# Patient Record
Sex: FEMALE | Race: BLACK OR AFRICAN AMERICAN | NOT HISPANIC OR LATINO | Employment: FULL TIME | URBAN - METROPOLITAN AREA
[De-identification: names, ages, dates, MRNs, and addresses within clinical notes are randomized per-mention and may not be internally consistent; named-entity substitution may affect disease eponyms.]

---

## 2018-10-13 ENCOUNTER — HOSPITAL ENCOUNTER (EMERGENCY)
Facility: HOSPITAL | Age: 38
Discharge: HOME | End: 2018-10-13
Attending: EMERGENCY MEDICINE | Admitting: EMERGENCY MEDICINE
Payer: COMMERCIAL

## 2018-10-13 VITALS
OXYGEN SATURATION: 98 % | HEART RATE: 75 BPM | TEMPERATURE: 96.7 F | RESPIRATION RATE: 16 BRPM | DIASTOLIC BLOOD PRESSURE: 71 MMHG | SYSTOLIC BLOOD PRESSURE: 109 MMHG | WEIGHT: 172.84 LBS

## 2018-10-13 DIAGNOSIS — M79.10 GENERALIZED MUSCLE ACHE: ICD-10-CM

## 2018-10-13 DIAGNOSIS — H92.01 RIGHT EAR PAIN: Primary | ICD-10-CM

## 2018-10-13 DIAGNOSIS — M54.9 BACK PAIN: ICD-10-CM

## 2018-10-13 PROCEDURE — 99283 EMERGENCY DEPT VISIT LOW MDM: CPT

## 2018-10-13 RX ORDER — LORATADINE 10 MG/1
10 TABLET ORAL DAILY
Qty: 20 TABLET | Refills: 0 | Status: SHIPPED | OUTPATIENT
Start: 2018-10-13 | End: 2019-03-28

## 2018-10-13 RX ORDER — BACLOFEN 10 MG/1
10 TABLET ORAL 3 TIMES DAILY
Qty: 15 TABLET | Refills: 0 | Status: SHIPPED | OUTPATIENT
Start: 2018-10-13 | End: 2019-03-28

## 2018-10-13 NOTE — DISCHARGE INSTRUCTIONS
Acute Low Back Pain   WHAT YOU NEED TO KNOW:   Acute low back pain is sudden discomfort in your lower back area that lasts for up to 6 weeks  The discomfort makes it difficult to tolerate activity  DISCHARGE INSTRUCTIONS:   Return to the emergency department if:   · You have severe pain  · You have sudden stiffness and heaviness on both buttocks down to both legs  · You have numbness or weakness in one leg, or pain in both legs  · You have numbness in your genital area or across your lower back  · You cannot control your urine or bowel movements  Contact your healthcare provider if:   · You have a fever  · You have pain at night or when you rest     · Your pain does not get better with treatment  · You have pain that worsens when you cough or sneeze  · You suddenly feel something pop or snap in your back  · You have questions or concerns about your condition or care  Medicines: The following medicines may be ordered by your healthcare provider:  · Acetaminophen  decreases pain  It is available without a doctor's order  Ask how much to take and how often to take it  Follow directions  Acetaminophen can cause liver damage if not taken correctly  · NSAIDs  help decrease swelling and pain  This medicine is available with or without a doctor's order  NSAIDs can cause stomach bleeding or kidney problems in certain people  If you take blood thinner medicine, always ask your healthcare provider if NSAIDs are safe for you  Always read the medicine label and follow directions  · Prescription pain medicine  may be given  Ask your healthcare provider how to take this medicine safely  · Muscle relaxers  decrease pain by relaxing the muscles in your lower spine  · Take your medicine as directed  Contact your healthcare provider if you think your medicine is not helping or if you have side effects  Tell him of her if you are allergic to any medicine   Keep a list of the medicines, vitamins, and herbs you take  Include the amounts, and when and why you take them  Bring the list or the pill bottles to follow-up visits  Carry your medicine list with you in case of an emergency  Self-care:   · Stay active  as much as you can without causing more pain  Bed rest could make your back pain worse  Start with some light exercises such as walking  Avoid heavy lifting until your pain is gone  Ask for more information about the activities or exercises that are right for you  · Ice  helps decrease swelling, pain, and muscle spams  Put crushed ice in a plastic bag  Cover it with a towel  Place it on your lower back for 20 to 30 minutes every 2 hours  Do this for about 2 to 3 days after your pain starts, or as directed  · Heat  helps decrease pain and muscle spasms  Start to use heat after treatment with ice has stopped  Use a small towel dampened with warm water or a heating pad, or sit in a warm bath  Apply heat on the area for 20 to 30 minutes every 2 hours for as many days as directed  Alternate heat and ice  Prevent acute low back pain:   · Use proper body mechanics  ¨ Bend at the hips and knees when you  objects  Do not bend from the waist  Use your leg muscles as you lift the load  Do not use your back  Keep the object close to your chest as you lift it  Try not to twist or lift anything above your waist     ¨ Change your position often when you stand for long periods of time  Rest one foot on a small box or footrest, and then switch to the other foot often  ¨ Try not to sit for long periods of time  When you do, sit in a straight-backed chair with your feet flat on the floor  Never reach, pull, or push while you are sitting  · Do exercises that strengthen your back muscles  Warm up before you exercise  Ask your healthcare provider the best exercises for you  · Maintain a healthy weight  Ask your healthcare provider how much you should weigh   Ask him to help you create a weight loss plan if you are overweight  Follow up with your healthcare provider as directed:  Return for a follow-up visit if you still have pain after 1 to 3 weeks of treatment  You may need to visit an orthopedist if your back pain lasts more than 12 weeks  Write down your questions so you remember to ask them during your visits  © 2017 [employer]  Information is for End User's use only and may not be sold, redistributed or otherwise used for commercial purposes  All illustrations and images included in CareNotes® are the copyrighted property of [employer]  or [employer]  The above information is an  only  It is not intended as medical advice for individual conditions or treatments  Talk to your doctor, nurse or pharmacist before following any medical regimen to see if it is safe and effective for you

## 2018-10-13 NOTE — ED PROVIDER NOTES
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)        Past Surgical History:   Procedure Laterality Date    TUBAL LIGATION         History reviewed  No pertinent family history  I have reviewed and agree with the history as documented  Social History   Substance Use Topics    Smoking status: Current Every Day Smoker     Packs/day: 0 25     Types: Cigarettes    Smokeless tobacco: Not on file    Alcohol use No        Review of Systems   All other systems reviewed and are negative  Physical Exam  Physical Exam   Constitutional: She is oriented to person, place, and time  She appears well-developed and well-nourished  No distress  HENT:   Head: Normocephalic and atraumatic  Right Ear: External ear normal    Left Ear: External ear normal    Ears:    TM normal b/l   Eyes: Conjunctivae are normal    EOM grossly intact   Neck: Normal range of motion  Neck supple  No JVD present  Cardiovascular: Normal rate  Pulmonary/Chest: Effort normal    Abdominal: Soft  Musculoskeletal:        Arms:  FROM back and LE b/l, steady gait, cap refill brisk, strength and sensation grossly intact throughout   Neurological: She is alert and oriented to person, place, and time  Skin: Skin is warm and dry  Capillary refill takes less than 2 seconds  Psychiatric: She has a normal mood and affect  Her behavior is normal    Nursing note and vitals reviewed  Vital Signs  ED Triage Vitals [10/13/18 1144]   Temperature Pulse Respirations Blood Pressure SpO2   (!) 96 7 °F (35 9 °C) 75 16 109/71 98 %      Temp Source Heart Rate Source Patient Position - Orthostatic VS BP Location FiO2 (%)   Tympanic Monitor Sitting Left arm --      Pain Score       --           Vitals:    10/13/18 1144   BP: 109/71   Pulse: 75   Patient Position - Orthostatic VS: Sitting       Visual Acuity      ED Medications  Medications - No data to display    Diagnostic Studies  Results Reviewed     None                 No orders to display              Procedures  Procedures       Phone Contacts  ED Phone Contact    ED Course                               MDM  Number of Diagnoses or Management Options  Back pain:   Generalized muscle ache:   Right ear pain:   Diagnosis management comments: 68-year-old female presenting with right ear pain and right lower back pain, physical exam right ear is benign no signs infection, will prescribe her claritin for home to use for congestion, patient also complaining of right lower back pain and she has a strenuous job and is constantly bending over and lifting heavy, will prescribe baclofen for home, patient also requesting work note, instructed to follow up with PCP as needed outpatient    strict return to ED precautions discussed  Pt verbalizes understanding and agrees with plan  Pt is stable for discharge    Portions of the record may have been created with voice recognition software   Occasional wrong word or "sound a like" substitutions may have occurred due to the inherent limitations of voice recognition software  Read the chart carefully and recognize, using context, where substitutions have occurred  CritCare Time    Disposition  Final diagnoses:   Right ear pain   Back pain   Generalized muscle ache     Time reflects when diagnosis was documented in both MDM as applicable and the Disposition within this note     Time User Action Codes Description Comment    10/13/2018 12:07 PM Xochitl Rasp Add [H92 01] Right ear pain     10/13/2018 12:07 PM Xochitl Rasp Add [M54 9] Back pain     10/13/2018 12:07 PM Xochitl Rasp Add [M79 10] Generalized muscle ache       ED Disposition     ED Disposition Condition Comment    Discharge  Radha Said discharge to home/self care  Condition at discharge: Good        Follow-up Information     Follow up With Specialties Details Why [address] [facility] Schedule an appointment as soon as possible for a visit As needed [address] [address]            Discharge Medication List as of 10/13/2018 12:08 PM      START taking these medications    Details   baclofen 10 mg tablet Take 1 tablet (10 mg total) by mouth 3 (three) times a day, Starting Sat 10/13/2018, Print      loratadine (CLARITIN) 10 mg tablet Take 1 tablet (10 mg total) by mouth daily, Starting Sat 10/13/2018, Print           No discharge procedures on file      ED Provider  Electronically Signed by           Floyd Strickland PA-C  10/13/18 5394

## 2018-11-08 ENCOUNTER — HOSPITAL ENCOUNTER (EMERGENCY)
Facility: HOSPITAL | Age: 38
Discharge: HOME | End: 2018-11-08
Attending: EMERGENCY MEDICINE
Payer: COMMERCIAL

## 2018-11-08 VITALS
DIASTOLIC BLOOD PRESSURE: 71 MMHG | HEART RATE: 61 BPM | SYSTOLIC BLOOD PRESSURE: 125 MMHG | OXYGEN SATURATION: 100 % | RESPIRATION RATE: 13 BRPM

## 2018-11-08 DIAGNOSIS — R11.2 NON-INTRACTABLE VOMITING WITH NAUSEA, UNSPECIFIED VOMITING TYPE: Primary | ICD-10-CM

## 2018-11-08 LAB
BACTERIA UR QL AUTO: ABNORMAL /HPF
BILIRUB UR QL STRIP: NEGATIVE
CLARITY UR: CLEAR
COLOR UR: ABNORMAL
EXT PREG TEST URINE: NORMAL
GLUCOSE UR STRIP-MCNC: NEGATIVE MG/DL
HGB UR QL STRIP.AUTO: 50
KETONES UR STRIP-MCNC: NEGATIVE MG/DL
LEUKOCYTE ESTERASE UR QL STRIP: 100
MUCOUS THREADS UR QL AUTO: ABNORMAL
NITRITE UR QL STRIP: NEGATIVE
NON-SQ EPI CELLS URNS QL MICRO: ABNORMAL /HPF
PH UR STRIP.AUTO: 6 [PH] (ref 4.5–8)
PROT UR STRIP-MCNC: ABNORMAL MG/DL
RBC #/AREA URNS AUTO: ABNORMAL /HPF
SP GR UR STRIP.AUTO: 1.02 (ref 1–1.04)
UROBILINOGEN UA: 4 MG/DL
WBC #/AREA URNS AUTO: ABNORMAL /HPF

## 2018-11-08 PROCEDURE — 81025 URINE PREGNANCY TEST: CPT | Performed by: NURSE PRACTITIONER

## 2018-11-08 PROCEDURE — 81001 URINALYSIS AUTO W/SCOPE: CPT | Performed by: NURSE PRACTITIONER

## 2018-11-08 PROCEDURE — 99283 EMERGENCY DEPT VISIT LOW MDM: CPT

## 2018-11-08 PROCEDURE — 81003 URINALYSIS AUTO W/O SCOPE: CPT | Performed by: NURSE PRACTITIONER

## 2018-11-08 RX ORDER — ONDANSETRON 4 MG/1
4 TABLET, ORALLY DISINTEGRATING ORAL EVERY 6 HOURS PRN
Qty: 12 TABLET | Refills: 0 | Status: SHIPPED | OUTPATIENT
Start: 2018-11-08 | End: 2019-03-06 | Stop reason: ALTCHOICE

## 2018-11-08 RX ORDER — CIPROFLOXACIN HYDROCHLORIDE 3.5 MG/ML
1 SOLUTION/ DROPS TOPICAL
Qty: 5 ML | Refills: 0 | Status: SHIPPED | OUTPATIENT
Start: 2018-11-08 | End: 2018-11-08

## 2018-11-08 RX ORDER — ONDANSETRON 4 MG/1
4 TABLET, ORALLY DISINTEGRATING ORAL ONCE
Status: COMPLETED | OUTPATIENT
Start: 2018-11-08 | End: 2018-11-08

## 2018-11-08 RX ADMIN — ONDANSETRON 4 MG: 4 TABLET, ORALLY DISINTEGRATING ORAL at 08:03

## 2018-11-08 NOTE — ED NOTES
Tolerated PO intake well  No vomiting noted/reported    Denies nausea at present     Jaswinder Daniel, RN  11/08/18 8453

## 2018-11-08 NOTE — ED NOTES
Pt given PO challenge of ginger ale    Will re-assess pt for tolerance     Dipti Hernández RN  11/08/18 0581

## 2018-11-08 NOTE — ED PROVIDER NOTES
History  Chief Complaint   Patient presents with    Vomiting     I have nausea all day yesterday  Still have it, but I vomited twice today  I have no diarrhea  Patient is a 27-year-old female presenting to the emergency department with constant nausea since yesterday with 2 episodes of vomiting today  Patient reports she woke yesterday with this nausea sensation, tried drinking ginger ale yesterday, with no relief  Today, while getting ready for work vomited twice  She denies any abdominal pain, only nausea sensation  Reports no diarrhea or constipation  Denies any fevers or chills  She reports no vaginal/pelvic pain or discomfort  No abnormal discharge  Reports she is finishing her menstrual period currently  Otherwise reports no other symptoms  Denies any ill contacts, with the exception of her children who she reports have cold-like symptoms  Patient otherwise reports no other concerns  Prior to Admission Medications   Prescriptions Last Dose Informant Patient Reported? Taking?   baclofen 10 mg tablet   No No   Sig: Take 1 tablet (10 mg total) by mouth 3 (three) times a day   loratadine (CLARITIN) 10 mg tablet   No No   Sig: Take 1 tablet (10 mg total) by mouth daily      Facility-Administered Medications: None       Past Medical History:   Diagnosis Date    Seizures ([facility] [address] )        Past Surgical History:   Procedure Laterality Date    TUBAL LIGATION         History reviewed  No pertinent family history  I have reviewed and agree with the history as documented  Social History   Substance Use Topics    Smoking status: Current Every Day Smoker     Packs/day: 0 25     Types: Cigarettes    Smokeless tobacco: Never Used    Alcohol use No        Review of Systems   Constitutional: Negative for chills, fatigue and fever  HENT: Negative  Respiratory: Negative for shortness of breath  Cardiovascular: Negative for chest pain  Gastrointestinal: Positive for nausea and vomiting  Negative for abdominal pain, constipation and diarrhea  Endocrine: Negative  Genitourinary: Negative for difficulty urinating, dysuria, enuresis, flank pain, frequency, menstrual problem, urgency, vaginal discharge and vaginal pain  Musculoskeletal: Negative for arthralgias, joint swelling, neck pain and neck stiffness  Skin: Negative for rash and wound  Allergic/Immunologic: Negative for immunocompromised state  Neurological: Negative for dizziness, light-headedness and headaches  Hematological: Negative for adenopathy  Physical Exam  Physical Exam   Constitutional: She is oriented to person, place, and time  She appears well-developed and well-nourished  No distress  HENT:   Right Ear: External ear normal    Left Ear: External ear normal    Mouth/Throat: Oropharynx is clear and moist    Eyes: Conjunctivae are normal    Neck: Normal range of motion  Neck supple  Cardiovascular: Normal rate and regular rhythm  Pulmonary/Chest: Effort normal and breath sounds normal  No respiratory distress  Abdominal: Soft  She exhibits no distension  There is no tenderness  There is no rebound and no guarding  Patient denies any abdominal tenderness, but reports worsening of nausea during palpation  Lymphadenopathy:     She has no cervical adenopathy  Neurological: She is alert and oriented to person, place, and time  Skin: Skin is warm and dry  No rash noted  Psychiatric: She has a normal mood and affect  Nursing note and vitals reviewed        Vital Signs  ED Triage Vitals [11/08/18 0808]   Temp Pulse Respirations Blood Pressure SpO2   -- 61 13 125/71 100 %      Temp src Heart Rate Source Patient Position - Orthostatic VS BP Location FiO2 (%)   -- Monitor Sitting Left arm --      Pain Score       --           Vitals:    11/08/18 0808   BP: 125/71   Pulse: 61   Patient Position - Orthostatic VS: Sitting       Visual Acuity      ED Medications  Medications   ondansetron (ZOFRAN-ODT) dispersible tablet 4 mg (4 mg Oral Given 11/8/18 0803)       Diagnostic Studies  Results Reviewed     Procedure Component Value Units Date/Time    Urine Microscopic [[id number]]  (Abnormal) Collected:  11/08/18 0809    Lab Status:  Final result Specimen:  Urine from Urine, Clean Catch Updated:  11/08/18 0831     RBC, UA 1-2 (A) /hpf      WBC, UA 2-4 (A) /hpf      Epithelial Cells Moderate (A) /hpf      Bacteria, UA None Seen /hpf      MUCUS THREADS Occasional (A)    UA w Reflex to Microscopic w Reflex to Culture [[id number]]  (Abnormal) Collected:  11/08/18 0809    Lab Status:  Final result Specimen:  Urine from Urine, Clean Catch Updated:  11/08/18 0828     Color, UA Isabel (A)     Clarity, UA Clear     Specific [place], UA 1 020     pH, UA 6 0     Leukocytes,  0 (A)     Nitrite, UA Negative     Protein, UA 15 (Trace) (A) mg/dl      Glucose, UA Negative mg/dl      Ketones, UA Negative mg/dl      Bilirubin, UA Negative     Blood, UA 50 0 (A)     UROBILINOGEN UA 4 0 (A) mg/dL     POCT pregnancy, urine [[id number]]  (Normal) Resulted:  11/08/18 0810    Lab Status:  Final result Updated:  11/08/18 0811     EXT PREG TEST UR (Ref: Negative) pregnancy result is negative                 No orders to display              Procedures  Procedures       Phone Contacts  ED Phone Contact    ED Course  ED Course as of Nov 08 0849   u Nov 08, 2018   0360 Pregnancy negative  UA shows no evidence of UTI  Patient reports nausea as moderately improved after receiving Zofran  Will do PO challenge  MDM  Number of Diagnoses or Management Options  Non-intractable vomiting with nausea, unspecified vomiting type: new and requires workup  Diagnosis management comments: Pregnancy negative  No UTI as seen on UA  Patient with resolution of nausea after receiving Zofran orally  Tolerating oral fluids well  No episodes of vomiting while in the emergency department    Patient instructed to contact info blink to establish care with a primary provider for follow-up  Advised to return to the ED with any worsening symptoms or emergent concerns  Amount and/or Complexity of Data Reviewed  Clinical lab tests: ordered and reviewed    Patient Progress  Patient progress: stable    CritCare Time    Disposition  Final diagnoses:   Non-intractable vomiting with nausea, unspecified vomiting type     Time reflects when diagnosis was documented in both MDM as applicable and the Disposition within this note     Time User Action Codes Description Comment    11/8/2018  8:15 AM Gagandeep Houston Add [H10 9] Conjunctivitis of left eye, unspecified conjunctivitis type     11/8/2018  8:30 AM Gagandeep Houston Remove [H10 9] Conjunctivitis of left eye, unspecified conjunctivitis type     11/8/2018  8:46 AM Gagandeep Houston Add [R11 2] Non-intractable vomiting with nausea, unspecified vomiting type       ED Disposition     ED Disposition Condition Comment    Discharge  Laurey Gowers discharge to home/self care  Condition at discharge: Stable        Follow-up Information     Follow up With Specialties Details Why Nicol Mendoza  Call to find a primary care provider for routine and follow up care  [facility] [place] Emergency Department Emergency Medicine  As needed, If symptoms worsen [address] [facility] [address] [phone]          Patient's Medications   Discharge Prescriptions    ONDANSETRON (ZOFRAN-ODT) 4 MG DISINTEGRATING TABLET    Take 1 tablet (4 mg total) by mouth every 6 (six) hours as needed for nausea or vomiting for up to 3 days       Start Date: 11/8/2018 End Date: 11/11/2018       Order Dose: 4 mg       Quantity: 12 tablet    Refills: 0     No discharge procedures on file      ED Provider  Electronically Signed by           KIM Phillips  11/08/18 Bryan Gaston  11/08/18 1861

## 2018-11-08 NOTE — DISCHARGE INSTRUCTIONS
Acute Nausea and Vomiting   WHAT YOU NEED TO KNOW:   Acute nausea and vomiting start suddenly, worsen quickly, and last a short time  DISCHARGE INSTRUCTIONS:   Return to the emergency department if:   · You see blood in your vomit or your bowel movements  · You have sudden, severe pain in your chest and upper abdomen after hard vomiting or retching  · You have swelling in your neck and chest      · You are dizzy, cold, and thirsty and your eyes and mouth are dry  · You are urinating very little or not at all  · You have muscle weakness, leg cramps, and trouble breathing  · Your heart is beating much faster than normal      · You continue to vomit for more than 48 hours  Contact your healthcare provider if:   · You have frequent dry heaves (vomiting but nothing comes out)  · Your nausea and vomiting does not get better or go away after you use medicine  · You have questions or concerns about your condition or treatment  Medicines: You may need any of the following:  · Medicines  may be given to calm your stomach and stop your vomiting  You may also need medicines to help you feel more relaxed or to stop nausea and vomiting caused by motion sickness  · Gastrointestinal stimulants  are used to help empty your stomach and bowels  This may help decrease nausea and vomiting  · Take your medicine as directed  Contact your healthcare provider if you think your medicine is not helping or if you have side effects  Tell him or her if you are allergic to any medicine  Keep a list of the medicines, vitamins, and herbs you take  Include the amounts, and when and why you take them  Bring the list or the pill bottles to follow-up visits  Carry your medicine list with you in case of an emergency  Prevent or manage acute nausea and vomiting:   · Do not drink alcohol  Alcohol may upset or irritate your stomach  Too much alcohol can also cause acute nausea and vomiting  · Control stress    Headaches due to stress may cause nausea and vomiting  Find ways to relax and manage your stress  Get more rest and sleep  · Drink more liquids as directed  Vomiting can lead to dehydration  It is important to drink more liquids to help replace lost body fluids  Ask your healthcare provider how much liquid to drink each day and which liquids are best for you  Your provider may recommend that you drink an oral rehydration solution (ORS)  ORS contains water, salts, and sugar that are needed to replace the lost body fluids  Ask what kind of ORS to use, how much to drink, and where to get it  · Eat smaller meals, more often  Eat small amounts of food every 2 to 3 hours, even if you are not hungry  Food in your stomach may decrease your nausea  · Talk to your healthcare provider before you take over-the-counter (OTC) medicines  These medicines can cause serious problems if you use certain other medicines, or you have a medical condition  You may have problems if you use too much or use them for longer than the label says  Follow directions on the label carefully  Follow up with your healthcare provider as directed:  Write down your questions so you remember to ask them during your follow-up visits  © 2017 [employer] [employer] Information is for End User's use only and may not be sold, redistributed or otherwise used for commercial purposes  All illustrations and images included in CareNotes® are the copyrighted property of [employer]  or [employer]  The above information is an  only  It is not intended as medical advice for individual conditions or treatments  Talk to your doctor, nurse or pharmacist before following any medical regimen to see if it is safe and effective for you

## 2019-01-18 ENCOUNTER — HOSPITAL ENCOUNTER (EMERGENCY)
Facility: HOSPITAL | Age: 39
Discharge: LEFT BEFORE DISCHARGE | End: 2019-01-18
Attending: EMERGENCY MEDICINE
Payer: COMMERCIAL

## 2019-01-18 VITALS
OXYGEN SATURATION: 98 % | SYSTOLIC BLOOD PRESSURE: 112 MMHG | TEMPERATURE: 93.5 F | RESPIRATION RATE: 16 BRPM | WEIGHT: 174.82 LBS | DIASTOLIC BLOOD PRESSURE: 76 MMHG | HEART RATE: 65 BPM

## 2019-01-18 DIAGNOSIS — N94.6 DYSMENORRHEA: Primary | ICD-10-CM

## 2019-01-18 LAB
BACTERIA UR QL AUTO: ABNORMAL /HPF
BILIRUB UR QL STRIP: NEGATIVE
CLARITY UR: ABNORMAL
COLOR UR: YELLOW
EXT PREG TEST URINE: NEGATIVE
GLUCOSE UR STRIP-MCNC: NEGATIVE MG/DL
HGB UR QL STRIP.AUTO: 250
KETONES UR STRIP-MCNC: NEGATIVE MG/DL
LEUKOCYTE ESTERASE UR QL STRIP: 25
MUCOUS THREADS UR QL AUTO: ABNORMAL
NITRITE UR QL STRIP: NEGATIVE
NON-SQ EPI CELLS URNS QL MICRO: ABNORMAL /HPF
PH UR STRIP.AUTO: 6 [PH] (ref 4.5–8)
PROT UR STRIP-MCNC: NEGATIVE MG/DL
RBC #/AREA URNS AUTO: ABNORMAL /HPF
SP GR UR STRIP.AUTO: 1.01 (ref 1–1.04)
UROBILINOGEN UA: NEGATIVE MG/DL
WBC #/AREA URNS AUTO: ABNORMAL /HPF

## 2019-01-18 PROCEDURE — 81025 URINE PREGNANCY TEST: CPT | Performed by: EMERGENCY MEDICINE

## 2019-01-18 PROCEDURE — 99284 EMERGENCY DEPT VISIT MOD MDM: CPT

## 2019-01-18 PROCEDURE — 81001 URINALYSIS AUTO W/SCOPE: CPT | Performed by: EMERGENCY MEDICINE

## 2019-01-18 RX ORDER — IBUPROFEN 600 MG/1
600 TABLET ORAL ONCE
Status: COMPLETED | OUTPATIENT
Start: 2019-01-18 | End: 2019-01-18

## 2019-01-18 RX ADMIN — IBUPROFEN 600 MG: 600 TABLET ORAL at 11:26

## 2019-01-18 NOTE — ED PROVIDER NOTES
History  Chief Complaint   Patient presents with    Dysmenorrhea     "I have had my menstrual two times this month and I am having a lot of pain"   c/o pelvic cramping   patient took no medication for pain and did not contact GYN due to "I don't have one and I need one"     This is a 75-year-old female presents emergency department with early menstrual cycle  Patient states that she or her last menses began at the beginning of January  It was a normal period  Began with light bleeding today  Positive cramping  No fevers or chills  Patient has her tubes tied  Pain is moderate severity  She did not try any over-the-counter medications for pain  Patient does not have an OBGYN  No radiation of symptoms  No aggravating or alleviating factors  No other associated symptoms  Differential diagnosis includes dysmenorrhea, pregnancy, UTI  Prior to Admission Medications   Prescriptions Last Dose Informant Patient Reported? Taking?   baclofen 10 mg tablet Not Taking at Unknown time  No No   Sig: Take 1 tablet (10 mg total) by mouth 3 (three) times a day   Patient not taking: Reported on 1/18/2019    loratadine (CLARITIN) 10 mg tablet Not Taking at Unknown time  No No   Sig: Take 1 tablet (10 mg total) by mouth daily   Patient not taking: Reported on 1/18/2019    ondansetron (ZOFRAN-ODT) 4 mg disintegrating tablet   No No   Sig: Take 1 tablet (4 mg total) by mouth every 6 (six) hours as needed for nausea or vomiting for up to 3 days      Facility-Administered Medications: None       Past Medical History:   Diagnosis Date    Seizures ([address] )        Past Surgical History:   Procedure Laterality Date    TUBAL LIGATION         History reviewed  No pertinent family history  I have reviewed and agree with the history as documented      Social History   Substance Use Topics    Smoking status: Current Every Day Smoker     Packs/day: 0 00    Smokeless tobacco: Never Used    Alcohol use No        Review of Systems   Constitutional: Negative for activity change, appetite change and fever  HENT: Negative for congestion, ear pain, rhinorrhea and sore throat  Eyes: Negative for pain and redness  Respiratory: Negative for cough and shortness of breath  Cardiovascular: Negative for chest pain and palpitations  Gastrointestinal: Positive for abdominal pain  Negative for diarrhea, nausea and vomiting  Endocrine: Negative for polyuria  Genitourinary: Positive for menstrual problem, pelvic pain and vaginal bleeding  Negative for decreased urine volume, difficulty urinating, dysuria, frequency, urgency, vaginal discharge and vaginal pain  Musculoskeletal: Negative for arthralgias and myalgias  Skin: Negative for color change and rash  Allergic/Immunologic: Negative for immunocompromised state  Neurological: Negative for dizziness, syncope and light-headedness  Hematological: Does not bruise/bleed easily  Psychiatric/Behavioral: Negative for confusion  Physical Exam  Physical Exam   Constitutional: She is oriented to person, place, and time  She appears well-developed and well-nourished  HENT:   Head: Normocephalic and atraumatic  Nose: Nose normal    Eyes: Conjunctivae are normal  No scleral icterus  Neck: Normal range of motion  Neck supple  Cardiovascular: Normal rate, regular rhythm, normal heart sounds and intact distal pulses  Pulmonary/Chest: Effort normal and breath sounds normal  No stridor  No respiratory distress  She has no wheezes  Abdominal: Soft  She exhibits no distension  There is tenderness (Mild suprapubic tenderness  No rebound or guarding  )  There is no rebound and no guarding  Musculoskeletal: She exhibits no edema or deformity  Neurological: She is alert and oriented to person, place, and time  Skin: Skin is warm and dry  No rash noted  Psychiatric: She has a normal mood and affect  Thought content normal    Nursing note and vitals reviewed        Vital Signs  ED Triage Vitals [01/18/19 1106]   Temperature Pulse Respirations Blood Pressure SpO2   (!) 93 5 °F (34 2 °C) 65 16 112/76 98 %      Temp Source Heart Rate Source Patient Position - Orthostatic VS BP Location FiO2 (%)   Tympanic Monitor Sitting Left arm --      Pain Score       --           Vitals:    01/18/19 1106   BP: 112/76   Pulse: 65   Patient Position - Orthostatic VS: Sitting       Visual Acuity      ED Medications  Medications   ibuprofen (MOTRIN) tablet 600 mg (600 mg Oral Given 1/18/19 1126)       Diagnostic Studies  Results Reviewed     Procedure Component Value Units Date/Time    Urine Microscopic [[id number]]  (Abnormal) Collected:  01/18/19 1126    Lab Status:  Final result Specimen:  Urine from Urine, Clean Catch Updated:  01/18/19 1144     RBC, UA 30-50 (A) /hpf      WBC, UA 4-10 (A) /hpf      Epithelial Cells Moderate (A) /hpf      Bacteria, UA Occasional /hpf      MUCUS THREADS Occasional (A)    UA w Reflex to Microscopic [[id number]]  (Abnormal) Collected:  01/18/19 1126    Lab Status:  Final result Specimen:  Urine from Urine, Clean Catch Updated:  01/18/19 1134     Color, UA Yellow     Clarity, UA Slightly Cloudy (A)     Specific Gravity, UA 1 015     pH, UA 6 0     Leukocytes, UA 25 0 (A)     Nitrite, UA Negative     Protein, UA Negative mg/dl      Glucose, UA Negative mg/dl      Ketones, UA Negative mg/dl      Bilirubin, UA Negative     Blood,  0 (A)     UROBILINOGEN UA Negative mg/dL     POCT pregnancy, urine [[id number]]  (Normal) Resulted:  01/18/19 1126    Lab Status:  Final result Updated:  01/18/19 1126     EXT PREG TEST UR (Ref: Negative) negative                 No orders to display              Procedures  Procedures       Phone Contacts  ED Phone Contact    ED Course  ED Course as of Jan 18 2314 Fri Jan 18, 2019   1149 Patient left AMA after speaking with nurse  Did not want to wait any longer                                   [facility]  CritCare Time    Disposition  Final diagnoses:   Dysmenorrhea     Time reflects when diagnosis was documented in both MDM as applicable and the Disposition within this note     Time User Action Codes Description Comment    1/18/2019 11:08 PM Darell Mcbride Add [N94 6] Dysmenorrhea       ED Disposition     ED Disposition Condition Comment    AMA  Date: 1/18/2019  Patient: Jose Angel Epps  Admitted: 1/18/2019 11:04 AM  Attending Provider: Michael Morse MD    Jose Angel Epps or her authorized caregiver has made the decision for the patient to leave the emergency department against the  advice of Dr Herber Charles  She or her authorized caregiver has been informed and understands the inherent risks, including death  She or her authorized caregiver has decided to accept the responsibility for this decision  Jose Angel Epps and all necessar y parties have been advised that she may return for further evaluation or treatment  Her condition at time of discharge was stable  Jose Angel Epps had current vital signs as follows:  /76 (BP Location: Left arm)   Pulse 65   Temp (!) 93 5 ° F (34 2 °C) (Tympanic)   Resp 16   Wt 79 3 kg (174 lb 13 2 oz)         Follow-up Information    None         Discharge Medication List as of 1/18/2019 11:55 AM      CONTINUE these medications which have NOT CHANGED    Details   baclofen 10 mg tablet Take 1 tablet (10 mg total) by mouth 3 (three) times a day, Starting Sat 10/13/2018, Print      loratadine (CLARITIN) 10 mg tablet Take 1 tablet (10 mg total) by mouth daily, Starting Sat 10/13/2018, Print      ondansetron (ZOFRAN-ODT) 4 mg disintegrating tablet Take 1 tablet (4 mg total) by mouth every 6 (six) hours as needed for nausea or vomiting for up to 3 days, Starting Thu 11/8/2018, Until Sun 11/11/2018, Print           No discharge procedures on file      ED Provider  Electronically Signed by           Michael Morse MD  01/18/19 9159

## 2019-03-06 ENCOUNTER — HOSPITAL ENCOUNTER (EMERGENCY)
Facility: HOSPITAL | Age: 39
Discharge: HOME | End: 2019-03-06
Attending: EMERGENCY MEDICINE
Payer: COMMERCIAL

## 2019-03-06 ENCOUNTER — APPOINTMENT (EMERGENCY)
Dept: RADIOLOGY | Facility: HOSPITAL | Age: 39
End: 2019-03-06
Payer: COMMERCIAL

## 2019-03-06 VITALS
WEIGHT: 175.71 LBS | RESPIRATION RATE: 18 BRPM | OXYGEN SATURATION: 100 % | SYSTOLIC BLOOD PRESSURE: 116 MMHG | TEMPERATURE: 97.1 F | HEART RATE: 69 BPM | DIASTOLIC BLOOD PRESSURE: 81 MMHG

## 2019-03-06 DIAGNOSIS — J18.9 PNEUMONIA: Primary | ICD-10-CM

## 2019-03-06 LAB
FLUAV + FLUBV RNA ISLT NAA+PROBE: NOT DETECTED
FLUAV + FLUBV RNA ISLT NAA+PROBE: NOT DETECTED

## 2019-03-06 PROCEDURE — 71046 X-RAY EXAM CHEST 2 VIEWS: CPT

## 2019-03-06 PROCEDURE — 87502 INFLUENZA DNA AMP PROBE: CPT | Performed by: PHYSICIAN ASSISTANT

## 2019-03-06 PROCEDURE — 99283 EMERGENCY DEPT VISIT LOW MDM: CPT

## 2019-03-06 RX ORDER — BENZONATATE 100 MG/1
100 CAPSULE ORAL EVERY 8 HOURS
Qty: 21 CAPSULE | Refills: 0 | Status: SHIPPED | OUTPATIENT
Start: 2019-03-06 | End: 2019-03-28

## 2019-03-06 RX ORDER — GUAIFENESIN 100 MG/5ML
200 SYRUP ORAL 3 TIMES DAILY PRN
Qty: 120 ML | Refills: 0 | Status: SHIPPED | OUTPATIENT
Start: 2019-03-06 | End: 2019-03-16

## 2019-03-06 RX ORDER — AZITHROMYCIN 250 MG/1
TABLET, FILM COATED ORAL
Qty: 6 TABLET | Refills: 0 | Status: SHIPPED | OUTPATIENT
Start: 2019-03-06 | End: 2019-03-10

## 2019-03-06 NOTE — ED PROVIDER NOTES
History  Chief Complaint   Patient presents with    Cough     cough, body aches, chest pain with coughing, sore throat, congestion and headache for 2 days  24-year-old female no significant past medical history presenting for evaluation cough  Patient states that starting yesterday she has had generalized body aches along with productive cough  She reports fevers T-max of 101° had been relieved with ibuprofen at home  She states that every time she coughs she feels pressure in the sinuses  She states she has had sore throat and ear pain as well  No abdominal pain chest pain shortness of breath nausea vomiting or diarrhea  She reports she is concerned as it she was recently around someone diagnosed with pneumonia  Prior to Admission Medications   Prescriptions Last Dose Informant Patient Reported? Taking?   baclofen 10 mg tablet Not Taking at Unknown time  No No   Sig: Take 1 tablet (10 mg total) by mouth 3 (three) times a day   Patient not taking: Reported on 1/18/2019    loratadine (CLARITIN) 10 mg tablet Not Taking at Unknown time  No No   Sig: Take 1 tablet (10 mg total) by mouth daily   Patient not taking: Reported on 1/18/2019       Facility-Administered Medications: None       Past Medical History:   Diagnosis Date    Seizures ([facility] [address] )        Past Surgical History:   Procedure Laterality Date    TUBAL LIGATION         History reviewed  No pertinent family history  I have reviewed and agree with the history as documented  Social History     Tobacco Use    Smoking status: Current Every Day Smoker     Packs/day: 0 25    Smokeless tobacco: Never Used   Substance Use Topics    Alcohol use: No    Drug use: No        Review of Systems   All other systems reviewed and are negative  Physical Exam  Physical Exam   Constitutional: She is oriented to person, place, and time  She appears well-developed and well-nourished  No distress     Appears uncomfortable   HENT:   Head: Normocephalic and atraumatic  Right Ear: External ear normal    Left Ear: External ear normal    Mouth/Throat: No oropharyngeal exudate  Clear nasal discharge, enlarged tonsils but nonerythematous, no exudates visualized   Eyes: Conjunctivae are normal    EOM grossly intact   Neck: Normal range of motion  Neck supple  No JVD present  Cardiovascular: Normal rate  Pulmonary/Chest: Effort normal  No stridor  No respiratory distress  She has no wheezes  She has no rales  Abdominal: Soft  There is no tenderness  Musculoskeletal:   FROM, steady gait, cap refill brisk, strength and sensation grossly intact throughout   Lymphadenopathy:     She has no cervical adenopathy  Neurological: She is alert and oriented to person, place, and time  Skin: Skin is warm and dry  Capillary refill takes less than 2 seconds  Psychiatric: She has a normal mood and affect  Her behavior is normal    Nursing note and vitals reviewed        Vital Signs  ED Triage Vitals [03/06/19 0920]   Temperature Pulse Respirations Blood Pressure SpO2   (!) 97 4 °F (36 3 °C) 81 18 121/77 100 %      Temp Source Heart Rate Source Patient Position - Orthostatic VS BP Location FiO2 (%)   Tympanic Monitor Sitting Left arm --      Pain Score       --           Vitals:    03/06/19 0920   BP: 121/77   Pulse: 81   Patient Position - Orthostatic VS: Sitting       Visual Acuity      ED Medications  Medications - No data to display    Diagnostic Studies  Results Reviewed     Procedure Component Value Units Date/Time    Rapid Flu-Viral RNA amplification [facility] HEART ONLY) [[id number]]  (Normal) Collected:  03/06/19 0949    Lab Status:  Final result Specimen:  Nares from Nose Updated:  03/06/19 1017     INFLUENZA A AMPLIFIED RNA Not Detected     INFLUENZA B AMPLIFIED Not Detected                 XR chest 2 views   Final Result by Twan Hallman MD (03/06 1025)      Subtle relative increased parenchymal density over the right lower chest on the frontal view, suspicious for small patchy area of right lower chest pneumonia  The study was marked in [facility] for immediate notification  Workstation performed: [id number]                    Procedures  Procedures       Phone Contacts  ED Phone Contact    ED Course                               MDM  Number of Diagnoses or Management Options  Diagnosis management comments: 31-year-old female presenting for evaluation of cough, patient states she did come into contact with something test positive for pneumonia, chest x-ray today patient is found have small patchy infiltrate on the right lower lobe, will treat for pneumonia and sent home with antitussives, patient is afebrile and otherwise appears well, nontoxic appearing no acute distress no respiratory distress, follow up with PCP for further evaluation    All imaging discussed with patient, strict return to ED precautions discussed  Pt verbalizes understanding and agrees with plan  Pt is stable for discharge    Portions of the record may have been created with voice recognition software  Occasional wrong word or "sound a like" substitutions may have occurred due to the inherent limitations of voice recognition software  Read the chart carefully and recognize, using context, where substitutions have occurred  Disposition  Final diagnoses:   Pneumonia     Time reflects when diagnosis was documented in both MDM as applicable and the Disposition within this note     Time User Action Codes Description Comment    3/6/2019 10:37 AM Maggie Roberts Add [J18 9] Pneumonia       ED Disposition     ED Disposition Condition Date/Time Comment    Discharge Stable Wed Mar 6, 2019 10:37 AM Cy Hurtado discharge to home/self care              Follow-up Information     Follow up With Specialties Details Why Contact Info    Heather Forte MD Family Medicine Call  If symptoms worsen [phone] [facility] [address]  [address] [facility]  [address]  [facility] [address]             Patient's Medications Discharge Prescriptions    AZITHROMYCIN (ZITHROMAX) 250 MG TABLET    Take 2 tablets today then 1 tablet daily x 4 days       Start Date: 3/6/2019  End Date: 3/10/2019       Order Dose: --       Quantity: 6 tablet    Refills: 0    BENZONATATE (TESSALON PERLES) 100 MG CAPSULE    Take 1 capsule (100 mg total) by mouth every 8 (eight) hours       Start Date: 3/6/2019  End Date: --       Order Dose: 100 mg       Quantity: 21 capsule    Refills: 0    GUAIFENESIN (ROBITUSSIN) 100 MG/5 ML SYRUP    Take 10 mL (200 mg total) by mouth 3 (three) times a day as needed for cough for up to 10 days       Start Date: 3/6/2019  End Date: 3/16/2019       Order Dose: 200 mg       Quantity: 120 mL    Refills: 0     No discharge procedures on file      ED Provider  Electronically Signed by           Lang Live PA-C  03/06/19 1629

## 2019-03-28 ENCOUNTER — APPOINTMENT (EMERGENCY)
Dept: RADIOLOGY | Facility: HOSPITAL | Age: 39
End: 2019-03-28
Payer: COMMERCIAL

## 2019-03-28 ENCOUNTER — HOSPITAL ENCOUNTER (EMERGENCY)
Facility: HOSPITAL | Age: 39
Discharge: HOME | End: 2019-03-28
Attending: EMERGENCY MEDICINE
Payer: COMMERCIAL

## 2019-03-28 VITALS
TEMPERATURE: 98 F | RESPIRATION RATE: 16 BRPM | WEIGHT: 175 LBS | HEIGHT: 71 IN | SYSTOLIC BLOOD PRESSURE: 120 MMHG | HEART RATE: 76 BPM | BODY MASS INDEX: 24.5 KG/M2 | DIASTOLIC BLOOD PRESSURE: 69 MMHG | OXYGEN SATURATION: 97 %

## 2019-03-28 DIAGNOSIS — J02.9 PHARYNGITIS: Primary | ICD-10-CM

## 2019-03-28 DIAGNOSIS — R05.9 COUGH: ICD-10-CM

## 2019-03-28 LAB — S PYO AG THROAT QL: NEGATIVE

## 2019-03-28 PROCEDURE — 87430 STREP A AG IA: CPT | Performed by: PHYSICIAN ASSISTANT

## 2019-03-28 PROCEDURE — 71046 X-RAY EXAM CHEST 2 VIEWS: CPT

## 2019-03-28 PROCEDURE — 99283 EMERGENCY DEPT VISIT LOW MDM: CPT

## 2019-03-28 RX ORDER — ALBUTEROL SULFATE 90 UG/1
2 AEROSOL, METERED RESPIRATORY (INHALATION) EVERY 4 HOURS PRN
Qty: 1 INHALER | Refills: 0 | Status: SHIPPED | OUTPATIENT
Start: 2019-03-28 | End: 2022-02-08 | Stop reason: HOSPADM

## 2019-03-28 RX ORDER — GUAIFENESIN 100 MG/5ML
200 SYRUP ORAL 3 TIMES DAILY PRN
Qty: 120 ML | Refills: 0 | Status: SHIPPED | OUTPATIENT
Start: 2019-03-28 | End: 2019-04-07

## 2019-04-30 PROBLEM — Z72.0 TOBACCO USE: Status: ACTIVE | Noted: 2018-12-06

## 2019-04-30 PROBLEM — N92.0 MENORRHAGIA WITH REGULAR CYCLE: Status: ACTIVE | Noted: 2018-12-06

## 2019-04-30 PROBLEM — G89.29 CHRONIC MIDLINE LOW BACK PAIN WITHOUT SCIATICA: Status: ACTIVE | Noted: 2018-12-06

## 2019-04-30 PROBLEM — G43.109 MIGRAINE WITH AURA AND WITHOUT STATUS MIGRAINOSUS, NOT INTRACTABLE: Status: ACTIVE | Noted: 2018-12-06

## 2019-04-30 PROBLEM — D50.0 IRON DEFICIENCY ANEMIA DUE TO CHRONIC BLOOD LOSS: Status: ACTIVE | Noted: 2018-12-06

## 2019-04-30 PROBLEM — M54.50 CHRONIC MIDLINE LOW BACK PAIN WITHOUT SCIATICA: Status: ACTIVE | Noted: 2018-12-06

## 2019-07-24 ENCOUNTER — HOSPITAL ENCOUNTER (EMERGENCY)
Facility: HOSPITAL | Age: 39
Discharge: HOME | End: 2019-07-24
Attending: EMERGENCY MEDICINE | Admitting: EMERGENCY MEDICINE
Payer: COMMERCIAL

## 2019-07-24 VITALS
DIASTOLIC BLOOD PRESSURE: 68 MMHG | OXYGEN SATURATION: 100 % | WEIGHT: 166 LBS | BODY MASS INDEX: 23.24 KG/M2 | SYSTOLIC BLOOD PRESSURE: 129 MMHG | TEMPERATURE: 97.3 F | HEIGHT: 71 IN | HEART RATE: 56 BPM | RESPIRATION RATE: 14 BRPM

## 2019-07-24 DIAGNOSIS — G43.909 MIGRAINE HEADACHE: Primary | ICD-10-CM

## 2019-07-24 LAB
ALBUMIN SERPL BCP-MCNC: 3.9 G/DL (ref 3–5.2)
ALP SERPL-CCNC: 53 U/L (ref 43–122)
ALT SERPL W P-5'-P-CCNC: 12 U/L (ref 9–52)
ANION GAP SERPL CALCULATED.3IONS-SCNC: 7 MMOL/L (ref 5–14)
AST SERPL W P-5'-P-CCNC: 24 U/L (ref 14–36)
BASOPHILS # BLD AUTO: 0 THOUSANDS/ΜL (ref 0–0.1)
BASOPHILS NFR BLD AUTO: 1 % (ref 0–1)
BILIRUB SERPL-MCNC: 0.3 MG/DL
BUN SERPL-MCNC: 9 MG/DL (ref 5–25)
CALCIUM SERPL-MCNC: 8.8 MG/DL (ref 8.4–10.2)
CHLORIDE SERPL-SCNC: 107 MMOL/L (ref 97–108)
CO2 SERPL-SCNC: 27 MMOL/L (ref 22–30)
CREAT SERPL-MCNC: 0.72 MG/DL (ref 0.6–1.2)
EOSINOPHIL # BLD AUTO: 0.2 THOUSAND/ΜL (ref 0–0.4)
EOSINOPHIL NFR BLD AUTO: 5 % (ref 0–6)
ERYTHROCYTE [DISTWIDTH] IN BLOOD BY AUTOMATED COUNT: 14.9 %
EXT PREG TEST URINE: NEGATIVE
EXT. CONTROL ED NAV: NORMAL
GFR SERPL CREATININE-BSD FRML MDRD: 122 ML/MIN/1.73SQ M
GLUCOSE SERPL-MCNC: 75 MG/DL (ref 70–99)
HCT VFR BLD AUTO: 37.4 % (ref 36–46)
HGB BLD-MCNC: 12.2 G/DL (ref 12–16)
LYMPHOCYTES # BLD AUTO: 1.5 THOUSANDS/ΜL (ref 0.5–4)
LYMPHOCYTES NFR BLD AUTO: 31 % (ref 25–45)
MCH RBC QN AUTO: 25.7 PG (ref 26–34)
MCHC RBC AUTO-ENTMCNC: 32.5 G/DL (ref 31–36)
MCV RBC AUTO: 79 FL (ref 80–100)
MICROCYTES BLD QL AUTO: PRESENT
MONOCYTES # BLD AUTO: 0.3 THOUSAND/ΜL (ref 0.2–0.9)
MONOCYTES NFR BLD AUTO: 7 % (ref 1–10)
NEUTROPHILS # BLD AUTO: 2.7 THOUSANDS/ΜL (ref 1.8–7.8)
NEUTS SEG NFR BLD AUTO: 56 % (ref 45–65)
PLATELET # BLD AUTO: 194 THOUSANDS/UL (ref 150–450)
PLATELET BLD QL SMEAR: ADEQUATE
PMV BLD AUTO: 9 FL (ref 8.9–12.7)
POIKILOCYTOSIS BLD QL SMEAR: PRESENT
POTASSIUM SERPL-SCNC: 3.8 MMOL/L (ref 3.6–5)
PROT SERPL-MCNC: 7.5 G/DL (ref 5.9–8.4)
RBC # BLD AUTO: 4.74 MILLION/UL (ref 4–5.2)
RBC MORPH BLD: NORMAL
SODIUM SERPL-SCNC: 141 MMOL/L (ref 137–147)
WBC # BLD AUTO: 4.8 THOUSAND/UL (ref 4.5–11)

## 2019-07-24 PROCEDURE — 81025 URINE PREGNANCY TEST: CPT | Performed by: EMERGENCY MEDICINE

## 2019-07-24 PROCEDURE — 99284 EMERGENCY DEPT VISIT MOD MDM: CPT | Performed by: EMERGENCY MEDICINE

## 2019-07-24 PROCEDURE — 96365 THER/PROPH/DIAG IV INF INIT: CPT

## 2019-07-24 PROCEDURE — 80053 COMPREHEN METABOLIC PANEL: CPT | Performed by: EMERGENCY MEDICINE

## 2019-07-24 PROCEDURE — 36415 COLL VENOUS BLD VENIPUNCTURE: CPT | Performed by: EMERGENCY MEDICINE

## 2019-07-24 PROCEDURE — 85025 COMPLETE CBC W/AUTO DIFF WBC: CPT | Performed by: EMERGENCY MEDICINE

## 2019-07-24 PROCEDURE — 99284 EMERGENCY DEPT VISIT MOD MDM: CPT

## 2019-07-24 PROCEDURE — 96375 TX/PRO/DX INJ NEW DRUG ADDON: CPT

## 2019-07-24 RX ORDER — BUTALBITAL, ACETAMINOPHEN AND CAFFEINE 50; 325; 40 MG/1; MG/1; MG/1
1 TABLET ORAL EVERY 4 HOURS PRN
Qty: 30 TABLET | Refills: 0 | Status: SHIPPED | OUTPATIENT
Start: 2019-07-24 | End: 2019-07-29

## 2019-07-24 RX ORDER — METOCLOPRAMIDE HYDROCHLORIDE 5 MG/ML
10 INJECTION INTRAMUSCULAR; INTRAVENOUS ONCE
Status: COMPLETED | OUTPATIENT
Start: 2019-07-24 | End: 2019-07-24

## 2019-07-24 RX ORDER — ONDANSETRON 4 MG/1
4 TABLET, ORALLY DISINTEGRATING ORAL EVERY 6 HOURS PRN
Qty: 8 TABLET | Refills: 0 | Status: SHIPPED | OUTPATIENT
Start: 2019-07-24 | End: 2019-10-24

## 2019-07-24 RX ORDER — MAGNESIUM SULFATE HEPTAHYDRATE 40 MG/ML
2 INJECTION, SOLUTION INTRAVENOUS ONCE
Status: COMPLETED | OUTPATIENT
Start: 2019-07-24 | End: 2019-07-24

## 2019-07-24 RX ORDER — DEXAMETHASONE SODIUM PHOSPHATE 4 MG/ML
10 INJECTION, SOLUTION INTRA-ARTICULAR; INTRALESIONAL; INTRAMUSCULAR; INTRAVENOUS; SOFT TISSUE ONCE
Status: COMPLETED | OUTPATIENT
Start: 2019-07-24 | End: 2019-07-24

## 2019-07-24 RX ORDER — KETOROLAC TROMETHAMINE 30 MG/ML
30 INJECTION, SOLUTION INTRAMUSCULAR; INTRAVENOUS ONCE
Status: COMPLETED | OUTPATIENT
Start: 2019-07-24 | End: 2019-07-24

## 2019-07-24 RX ORDER — DIPHENHYDRAMINE HYDROCHLORIDE 50 MG/ML
25 INJECTION INTRAMUSCULAR; INTRAVENOUS ONCE
Status: COMPLETED | OUTPATIENT
Start: 2019-07-24 | End: 2019-07-24

## 2019-07-24 RX ADMIN — DEXAMETHASONE SODIUM PHOSPHATE 10 MG: 4 INJECTION, SOLUTION INTRA-ARTICULAR; INTRALESIONAL; INTRAMUSCULAR; INTRAVENOUS; SOFT TISSUE at 08:18

## 2019-07-24 RX ADMIN — MAGNESIUM SULFATE IN WATER 2 G: 40 INJECTION, SOLUTION INTRAVENOUS at 07:40

## 2019-07-24 RX ADMIN — SODIUM CHLORIDE 1000 ML: 0.9 INJECTION, SOLUTION INTRAVENOUS at 07:46

## 2019-07-24 RX ADMIN — KETOROLAC TROMETHAMINE 30 MG: 30 INJECTION, SOLUTION INTRAMUSCULAR; INTRAVENOUS at 08:16

## 2019-07-24 RX ADMIN — DIPHENHYDRAMINE HYDROCHLORIDE 25 MG: 50 INJECTION, SOLUTION INTRAMUSCULAR; INTRAVENOUS at 07:35

## 2019-07-24 RX ADMIN — METOCLOPRAMIDE 10 MG: 5 INJECTION, SOLUTION INTRAMUSCULAR; INTRAVENOUS at 07:37

## 2019-07-24 NOTE — ED PROVIDER NOTES
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  She denies any diplopia, amaurosis fugax, paresthesias throughout the bilateral upper extremities and lower extremities  She denies any weakness throughout the bilateral upper extremities or lower extremities  She denies any palpitations, syncope  She denies any slurred speech or head trauma  She denies any tinnitus  She said she take Motrin only without relief  She reports that she has had headaches in the past   No family history of subarachnoid  She has never been on preventative medications and never been on abortive medications either        History provided by:  Patient   used: No    Headache   Pain location:  Generalized  Quality:  Dull  Radiates to:  Does not radiate  Onset quality:  Gradual  Timing:  Intermittent  Progression:  Waxing and waning  Chronicity:  Recurrent  Similar to prior headaches: yes    Context: not activity, not exposure to bright light, not caffeine, not coughing, not defecating, not eating, not stress, not exposure to cold air, not intercourse, not loud noise and not straining    Relieved by:  Nothing  Worsened by:  Nothing  Ineffective treatments:  NSAIDs  Associated symptoms: nausea and photophobia    Associated symptoms: no abdominal pain, no back pain, no blurred vision, no congestion, no cough, no diarrhea, no dizziness, no drainage, no ear pain, no eye pain, no facial pain, no fatigue, no fever, no focal weakness, no hearing loss, no loss of balance, no myalgias, no near-syncope, no neck pain, no neck stiffness, no numbness, no paresthesias, no seizures, no sinus pressure, no sore throat, no swollen glands, no syncope, no tingling, no URI, no visual change, no vomiting and no weakness    Risk factors: no anger, no family hx of SAH, does not have insomnia and lifestyle not sedentary        Prior to Admission Medications   Prescriptions Last Dose Informant Patient Reported? Taking? albuterol (PROVENTIL HFA,VENTOLIN HFA) 90 mcg/act inhaler Past Month at Unknown time  No Yes   Sig: Inhale 2 puffs every 4 (four) hours as needed for wheezing   rizatriptan (MAXALT-MLT) 10 MG disintegrating tablet Not Taking at Unknown time  Yes No   Sig: Take 10 mg by mouth once as needed for migraine       Facility-Administered Medications: None       Past Medical History:   Diagnosis Date    Anemia     [facility] ([facility] )     [facility]izures ([facility] )        Past Surgical History:   Procedure Laterality Date    TUBAL LIGATION         History reviewed  No pertinent family history  I have reviewed and agree with the history as documented  Social History     Tobacco Use    Smoking status: Current Every Day Smoker     Packs/day: 0 25    Smokeless tobacco: Never Used   Substance Use Topics    Alcohol use: No    Drug use: No        Review of Systems   Constitutional: Negative for diaphoresis, fatigue and fever  HENT: Negative for congestion, ear pain, hearing loss, postnasal drip, sinus pressure and sore throat  Eyes: Positive for photophobia  Negative for blurred vision, pain and visual disturbance  Respiratory: Negative for cough, chest tightness and shortness of breath  Cardiovascular: Negative for chest pain, palpitations, syncope and near-syncope  Gastrointestinal: Positive for nausea  Negative for abdominal pain, diarrhea and vomiting  Genitourinary: Negative for difficulty urinating and dysuria  Musculoskeletal: Negative for back pain, myalgias, neck pain and neck stiffness  Skin: Negative for rash  Neurological: Positive for headaches  Negative for dizziness, focal weakness, seizures, weakness, numbness, paresthesias and loss of balance  Psychiatric/Behavioral: Negative for confusion  All other systems reviewed and are negative  Physical Exam  Physical Exam   Constitutional: She is oriented to person, place, and time  She appears well-developed and well-nourished  No distress  HENT:   Head: Normocephalic and atraumatic  Mouth/Throat: Oropharynx is clear and moist    Patient maintaining airway maintaining secretions  Eyes: Pupils are equal, round, and reactive to light  Conjunctivae and EOM are normal    Neck: Normal range of motion  Neck supple  Cardiovascular: Normal rate, regular rhythm, normal heart sounds and intact distal pulses  No murmur heard  Pulmonary/Chest: Effort normal and breath sounds normal  No stridor  No respiratory distress  Abdominal: Soft  Bowel sounds are normal  She exhibits no distension  There is no tenderness  Musculoskeletal: Normal range of motion  She exhibits no edema  Neurological: She is alert and oriented to person, place, and time  She has normal strength  No cranial nerve deficit or sensory deficit  Gait normal  GCS eye subscore is 4  GCS verbal subscore is 5  GCS motor subscore is 6  No slurred speech  No facial asymmetry  No ataxia  Negative pronator drift  No nystagmus   Skin: Skin is warm  Capillary refill takes less than 2 seconds  She is not diaphoretic  Nursing note and vitals reviewed        Vital Signs  ED Triage Vitals [07/24/19 0647]   Temperature Pulse Respirations Blood Pressure SpO2   (!) 96 °F (35 6 °C) 65 20 115/63 98 %      Temp Source Heart Rate Source Patient Position - Orthostatic VS BP Location FiO2 (%)   Tympanic Monitor Sitting Left arm --      Pain Score       Worst Possible Pain           Vitals:    07/24/19 0647 07/24/19 0850   BP: 115/63 129/68   Pulse: 65 56   Patient Position - Orthostatic VS: Sitting Lying         Visual Acuity  Visual Acuity      Most Recent Value   L Pupil Size (mm)  4   R Pupil Size (mm)  4          ED Medications  Medications   sodium chloride 0 9 % bolus 1,000 mL (1,000 mL Intravenous New Bag 7/24/19 0746)   metoclopramide (REGLAN) injection 10 mg (10 mg Intravenous Given 7/24/19 0737)   diphenhydrAMINE (BENADRYL) injection 25 mg (25 mg Intravenous Given 7/24/19 0735)   magnesium sulfate 2 g/50 mL IVPB (premix) 2 g (0 g Intravenous Stopped 7/24/19 0850)   ketorolac (TORADOL) injection 30 mg (30 mg Intravenous Given 7/24/19 0816)   dexamethasone (DECADRON) injection 10 mg (10 mg Intravenous Given 7/24/19 0818)       Diagnostic Studies  Results Reviewed     Procedure Component Value Units Date/Time    Comprehensive metabolic panel [[id number]]  (Normal) Collected:  07/24/19 0725    Lab Status:  Final result Specimen:  Blood from Arm, Left Updated:  07/24/19 0744     Sodium 141 mmol/L      Potassium 3 8 mmol/L      Chloride 107 mmol/L      CO2 27 mmol/L      ANION GAP 7 mmol/L      BUN 9 mg/dL      Creatinine 0 72 mg/dL      Glucose 75 mg/dL      Calcium 8 8 mg/dL      AST 24 U/L      ALT 12 U/L      Alkaline Phosphatase 53 U/L      Total Protein 7 5 g/dL      Albumin 3 9 g/dL      Total Bilirubin 0 30 mg/dL      eGFR 122 ml/min/1 73sq m     Narrative:       Leandra guidelines for Chronic Kidney Disease (CKD):     Stage 1 with normal or high GFR (GFR > 90 mL/min/1 73 square meters)    Stage 2 Mild CKD (GFR = 60-89 mL/min/1 73 square meters)    Stage 3A Moderate CKD (GFR = 45-59 mL/min/1 73 square meters)    Stage 3B Moderate CKD (GFR = 30-44 mL/min/1 73 square meters)    Stage 4 Severe CKD (GFR = 15-29 mL/min/1 73 square meters)    Stage 5 End Stage CKD (GFR <15 mL/min/1 73 square meters)  Note: GFR calculation is accurate only with a steady state creatinine    CBC and differential [[id number]]  (Abnormal) Collected:  07/24/19 0725    Lab Status:  Final result Specimen:  Blood from Arm, Left Updated:  07/24/19 0739     WBC 4 80 Thousand/uL      RBC 4 74 Million/uL      Hemoglobin 12 2 g/dL      Hematocrit 37 4 %      MCV 79 fL      MCH 25 7 pg      MCHC 32 5 g/dL      RDW 14 9 %      MPV 9 0 fL      Platelets 944 Thousands/uL      Neutrophils Relative 56 %      Lymphocytes Relative 31 %      Monocytes Relative 7 %      Eosinophils Relative 5 %      Basophils Relative 1 %      Neutrophils Absolute 2 70 Thousands/µL      Lymphocytes Absolute 1 50 Thousands/µL      Monocytes Absolute 0 30 Thousand/µL      Eosinophils Absolute 0 20 Thousand/µL      Basophils Absolute 0 00 Thousands/µL     POCT pregnancy, urine [[id number]]  (Normal) Resulted:  07/24/19 0709    Lab Status:  Final result Updated:  07/24/19 0709     EXT PREG TEST UR (Ref: Negative) Negative     Control valid                 No orders to display              Procedures  Procedures       ED Course  ED Course as of Jul 24 0856 Wed Jul 24, 2019   0709 Patient is a 77-year-old female coming in today with complaints of headache  Patient on exam is neuro intact with no focal deficits  She is nontoxic appearing with no meningeal signs  Will start with checking basic labs, IV fluids as well as cocktail  Patient agreeable  Portions of the record may have been created with voice recognition software  Occasional wrong word or "sound a like" substitutions may have occurred due to the inherent limitations of voice recognition software  Read the chart carefully and recognize, using context, where substitutions have occurred  0673 Patient sitting in bed texting on phone  She is in no distress  She states she feels better  Will re-dose medications this time give Toradol and Decadron  8343 Patient states that her headache is at a 3  She feels markedly improved    Long discussion with her to keep headache journal, follow-up with PCP as she will most likely need a preventative medication  Also discussed with her to start taking magnesium daily  Patient does state that she sometimes gets which she describes as sick good toe mass  I discussed with her that when she sees these to take medications I will give her including Motrin (as well as Zofran and Fioricet) within 1/2 hour  I also reported to her that, if she gets her migraines associated with her menses, start taking Motrin 3 times a day 2 days before her menses  Patient remains alert oriented x3, cranial nerves 2-12 grossly intact without any focal neuro deficits  On exam patient has no slurred speech, NIH remains 0  MDM    Disposition  Final diagnoses:   Migraine headache     Time reflects when diagnosis was documented in both MDM as applicable and the Disposition within this note     Time User Action Codes Description Comment    7/24/2019  8:11 AM Betinadra Free Add [G43 909] Migraine headache       ED Disposition     ED Disposition Condition Date/Time Comment    Discharge Stable Wed Jul 24, 2019  8:11 AM Chris Velasquez discharge to home/self care              Follow-up Information     Follow up With Specialties Details Why Contact Info    Dinh Duran MD Family Medicine Schedule an appointment as soon as possible for a visit in 2 days  [address]  [address] [facility] [address]             Patient's Medications   Discharge Prescriptions    BUTALBITAL-ACETAMINOPHEN-CAFFEINE (FIORICET,ESGIC) -40 MG PER TABLET    Take 1 tablet by mouth every 4 (four) hours as needed for headaches for up to 5 days       Start Date: 7/24/2019 End Date: 7/29/2019       Order Dose: 1 tablet       Quantity: 30 tablet    Refills: 0    MAGNESIUM OXIDE (MAG-OX) 400 MG    Take 1 tablet (400 mg total) by mouth daily for 14 days       Start Date: 7/24/2019 End Date: 8/7/2019       Order Dose: 400 mg       Quantity: 14 tablet    Refills: 0    ONDANSETRON (ZOFRAN-ODT) 4 MG DISINTEGRATING TABLET    Take 1 tablet (4 mg total) by mouth every 6 (six) hours as needed for nausea or vomiting for up to 2 days       Start Date: 7/24/2019 End Date: 7/26/2019       Order Dose: 4 mg       Quantity: 8 tablet    Refills: 0     No discharge procedures on file      ED Provider  Electronically Signed by           Vila Bosworth, DO  07/24/19 8089

## 2019-07-24 NOTE — DISCHARGE INSTRUCTIONS
KEEP A HEADACHE LOG - WRITE DOWN WHEN YOU HAVE HEADACHES AND WHAT YOU ATE; WEATHER; IF YOU HAVE YOUR PERIOD, ETC     ONCE YOU START HAVING YEAR VISUAL FLASHES, TAKE A 600 MILLIGRAM MOTRIN, FIORICET, ZOFRAN WITHIN 30 MINUTES  IF YEAR HEADACHES ARE ASSOCIATED WITH HER PERIODS, START TAKING MOTRIN AT 3 TIMES A DAY;  2 DAYS BEFORE YOUR PERIOD       YOU MUST FOLLOW UP WITH YOUR PCP AS YOU MAY NEED PREVENTATIVE MEDICATIONS

## 2019-09-23 ENCOUNTER — HOSPITAL ENCOUNTER (EMERGENCY)
Facility: HOSPITAL | Age: 39
Discharge: HOME | End: 2019-09-23
Attending: EMERGENCY MEDICINE | Admitting: EMERGENCY MEDICINE
Payer: COMMERCIAL

## 2019-09-23 VITALS
BODY MASS INDEX: 23.49 KG/M2 | SYSTOLIC BLOOD PRESSURE: 106 MMHG | DIASTOLIC BLOOD PRESSURE: 64 MMHG | HEART RATE: 86 BPM | TEMPERATURE: 97.8 F | RESPIRATION RATE: 18 BRPM | OXYGEN SATURATION: 98 % | WEIGHT: 168.43 LBS

## 2019-09-23 DIAGNOSIS — H10.9 CONJUNCTIVITIS: Primary | ICD-10-CM

## 2019-09-23 PROCEDURE — 99282 EMERGENCY DEPT VISIT SF MDM: CPT

## 2019-09-23 PROCEDURE — 99282 EMERGENCY DEPT VISIT SF MDM: CPT | Performed by: PHYSICIAN ASSISTANT

## 2019-09-23 RX ORDER — TETRACAINE HYDROCHLORIDE 5 MG/ML
2 SOLUTION OPHTHALMIC ONCE
Status: COMPLETED | OUTPATIENT
Start: 2019-09-23 | End: 2019-09-23

## 2019-09-23 RX ADMIN — TETRACAINE HYDROCHLORIDE 2 DROP: 5 SOLUTION OPHTHALMIC at 20:44

## 2019-09-23 RX ADMIN — FLUORESCEIN SODIUM 1 STRIP: 1 STRIP OPHTHALMIC at 20:44

## 2019-09-24 NOTE — ED PROVIDER NOTES
History  Chief Complaint   Patient presents with    Eye Redness     c/o R eye redness and pain  states she woke up yesterday with it  states it's itchy and draining     This is a 43 y/o F who presents today with R eye discharge and ciliary injection that started 2 days ago  The patient reports irritation  She reports a FB sensation  She denies any ocular pain or blurred vision  No double vision  No swelling  No photophobia  No headache  No fever  She reports discharge  Prior to Admission Medications   Prescriptions Last Dose Informant Patient Reported? Taking? albuterol (PROVENTIL HFA,VENTOLIN HFA) 90 mcg/act inhaler   No No   Sig: Inhale 2 puffs every 4 (four) hours as needed for wheezing   magnesium oxide (MAG-OX) 400 mg   No No   Sig: Take 1 tablet (400 mg total) by mouth daily for 14 days   ondansetron (ZOFRAN-ODT) 4 mg disintegrating tablet   No No   Sig: Take 1 tablet (4 mg total) by mouth every 6 (six) hours as needed for nausea or vomiting for up to 2 days   rizatriptan (MAXALT-MLT) 10 MG disintegrating tablet   Yes No   Sig: Take 10 mg by mouth once as needed for migraine       Facility-Administered Medications: None       Past Medical History:   Diagnosis Date    Anemia     [facility]     [facility]        Past Surgical History:   Procedure Laterality Date    TUBAL LIGATION         History reviewed  No pertinent family history  I have reviewed and agree with the history as documented  Social History     Tobacco Use    Smoking status: Current Every Day Smoker     Packs/day: 0 25    Smokeless tobacco: Never Used   Substance Use Topics    Alcohol use: No    Drug use: No        Review of Systems   Constitutional: Negative  HENT: Negative  Eyes: Positive for discharge and redness  Negative for photophobia, pain, itching and visual disturbance  Respiratory: Negative  Cardiovascular: Negative  Gastrointestinal: Negative  Endocrine: Negative      Genitourinary: Negative  Musculoskeletal: Negative  Skin: Negative  Allergic/Immunologic: Negative  Neurological: Negative  Hematological: Negative  Psychiatric/Behavioral: Negative  Physical Exam  Physical Exam   Constitutional: She appears well-developed and well-nourished  HENT:   Head: Normocephalic and atraumatic  Right Ear: Tympanic membrane and ear canal normal    Left Ear: Tympanic membrane and ear canal normal    Nose: Nose normal    Eyes: Pupils are equal, round, and reactive to light  EOM and lids are normal  Lids are everted and swept, no foreign bodies found  Right conjunctiva is injected  Left conjunctiva is not injected  Left conjunctiva has no hemorrhage  No scleral icterus  Right eye exhibits normal extraocular motion and no nystagmus  Left eye exhibits normal extraocular motion and no nystagmus  Right pupil is round and reactive  Left pupil is round and reactive  Pupils are equal    Fluorescein stain negative for any FB or abrasion  No pain upon palpation of orbit    No swelling on exam     Cardiovascular: Normal rate, regular rhythm and normal heart sounds  Pulmonary/Chest: Effort normal and breath sounds normal    Skin: Capillary refill takes less than 2 seconds  Psychiatric: She has a normal mood and affect  Her behavior is normal  Judgment and thought content normal        Vital Signs  ED Triage Vitals [09/23/19 2019]   Temperature Pulse Respirations Blood Pressure SpO2   97 8 °F (36 6 °C) 86 18 106/64 98 %      Temp Source Heart Rate Source Patient Position - Orthostatic VS BP Location FiO2 (%)   Tympanic Monitor Sitting Left arm --      Pain Score       6           Vitals:    09/23/19 2019   BP: 106/64   Pulse: 86   Patient Position - Orthostatic VS: Sitting         Visual Acuity  Visual Acuity      Most Recent Value   Visual acuity R eye is  20/25   Visual acuity Left eye is  20/20   Visual acuity in both eyes is  20/20   Wearing corrective eyewear/lenses?   Yes ED Medications  Medications   tetracaine 0 5 % ophthalmic solution 2 drop (2 drops Right Eye Given 9/23/19 2044)   fluorescein sodium sterile 1 mg ophthalmic strip 1 strip (1 strip Right Eye Given 9/23/19 2044)       Diagnostic Studies  Results Reviewed     None                 No orders to display              Procedures  Procedures       ED Course                               MDM  Number of Diagnoses or Management Options  Conjunctivitis:   Diagnosis management comments: Stain negative for any abrasion or ulcer  Patient d/c home with polysporin ointment  Patient d/c home stable  Disposition  Final diagnoses:   Conjunctivitis     Time reflects when diagnosis was documented in both MDM as applicable and the Disposition within this note     Time User Action Codes Description Comment    9/23/2019  8:54 PM Erika EMERY Add [H10 9] Conjunctivitis       ED Disposition     ED Disposition Condition Date/Time Comment    Discharge Stable Mon Sep 23, 2019  8:54 PM Sierra Marcelo discharge to home/self care              Follow-up Information     Follow up With Specialties Details Why Contact Info    Luan Jefferson MD Family Medicine Schedule an appointment as soon as possible for a visit   [address] [facility]  [address] [facility]  [place] [address]  [phone]            Discharge Medication List as of 9/23/2019  8:56 PM      START taking these medications    Details   bacitracin-polymyxin b (POLYSPORIN) ophthalmic ointment Administer to the right eye every 4 (four) hours while awake for 7 days, Starting Mon 9/23/2019, Until Mon 9/30/2019, Print         CONTINUE these medications which have NOT CHANGED    Details   albuterol (PROVENTIL HFA,VENTOLIN HFA) 90 mcg/act inhaler Inhale 2 puffs every 4 (four) hours as needed for wheezing, Starting u 3/28/2019, Print      magnesium oxide (MAG-OX) 400 mg Take 1 tablet (400 mg total) by mouth daily for 14 days, Starting Wed 7/24/2019, Until Wed 8/7/2019, Print ondansetron (ZOFRAN-ODT) 4 mg disintegrating tablet Take 1 tablet (4 mg total) by mouth every 6 (six) hours as needed for nausea or vomiting for up to 2 days, Starting Wed 7/24/2019, Until Fri 7/26/2019, Print      rizatriptan (MAXALT-MLT) 10 MG disintegrating tablet Take 10 mg by mouth once as needed for migraine , Starting Thu 12/6/2018, Until Fri 12/6/2019, Historical Med           No discharge procedures on file      ED Provider  Electronically Signed by           Efren Alarcon PA-C  09/23/19 8155

## 2019-09-26 ENCOUNTER — HOSPITAL ENCOUNTER (EMERGENCY)
Facility: HOSPITAL | Age: 39
Discharge: HOME | End: 2019-09-26
Attending: EMERGENCY MEDICINE
Payer: COMMERCIAL

## 2019-09-26 VITALS
HEART RATE: 77 BPM | SYSTOLIC BLOOD PRESSURE: 111 MMHG | BODY MASS INDEX: 23.31 KG/M2 | OXYGEN SATURATION: 100 % | WEIGHT: 167.11 LBS | TEMPERATURE: 97.1 F | DIASTOLIC BLOOD PRESSURE: 68 MMHG | RESPIRATION RATE: 18 BRPM

## 2019-09-26 DIAGNOSIS — H10.13 ALLERGIC CONJUNCTIVITIS OF BOTH EYES: Primary | ICD-10-CM

## 2019-09-26 PROCEDURE — 99283 EMERGENCY DEPT VISIT LOW MDM: CPT

## 2019-09-26 PROCEDURE — 99283 EMERGENCY DEPT VISIT LOW MDM: CPT | Performed by: PHYSICIAN ASSISTANT

## 2019-09-26 RX ORDER — CROMOLYN SODIUM 40 MG/ML
1 SOLUTION/ DROPS OPHTHALMIC 4 TIMES DAILY
Qty: 10 ML | Refills: 0 | Status: SHIPPED | OUTPATIENT
Start: 2019-09-26 | End: 2019-09-28

## 2019-09-26 NOTE — ED NOTES
Patient states she awakes in the morning to mucus discharge and swelling in both eyes     Gwyneth Lesches, RN  09/26/19 6188

## 2019-09-27 NOTE — ED PROVIDER NOTES
History  Chief Complaint   Patient presents with    Eye Pain     Patient states she had pinkeye and was seen here 2 days ago, states medication is not working and it has since spread to her left eye as well  States she also has a cold now  This is a 55-year-old female who presents today for persistent ciliary injection and eye irritation  The patient reports she was just seen here 2 days ago  She reports her eyes are extremely itchy  She denies any blurred vision or double vision  She reports no headaches  She reports no trauma  She reports no ocular swelling or fever  She has no photophobia  She reports she has itching her eyes  She has been using the polymyxin drops as prescribed  Prior to Admission Medications   Prescriptions Last Dose Informant Patient Reported? Taking? albuterol (PROVENTIL HFA,VENTOLIN HFA) 90 mcg/act inhaler   No No   Sig: Inhale 2 puffs every 4 (four) hours as needed for wheezing   bacitracin-polymyxin b (POLYSPORIN) ophthalmic ointment   No No   Sig: Administer to the right eye every 4 (four) hours while awake for 7 days   magnesium oxide (MAG-OX) 400 mg   No No   Sig: Take 1 tablet (400 mg total) by mouth daily for 14 days   ondansetron (ZOFRAN-ODT) 4 mg disintegrating tablet   No No   Sig: Take 1 tablet (4 mg total) by mouth every 6 (six) hours as needed for nausea or vomiting for up to 2 days   rizatriptan (MAXALT-MLT) 10 MG disintegrating tablet   Yes No   Sig: Take 10 mg by mouth once as needed for migraine       Facility-Administered Medications: None       Past Medical History:   Diagnosis Date    Anemia     [facility] [address] [facility]     [facility]ures [facility] [address] [facility]        Past Surgical History:   Procedure Laterality Date    TUBAL LIGATION         History reviewed  No pertinent family history  I have reviewed and agree with the history as documented      Social History     Tobacco Use    Smoking status: Current Every Day Smoker     Packs/day: 0 25    Smokeless tobacco: Never Used   Substance Use Topics    Alcohol use: No    Drug use: No        Review of Systems   Constitutional: Negative  HENT: Negative  Eyes: Positive for discharge and itching  Respiratory: Negative  Cardiovascular: Negative  Gastrointestinal: Negative  Endocrine: Negative  Genitourinary: Negative  Musculoskeletal: Negative  Skin: Negative  Allergic/Immunologic: Negative  Neurological: Negative  Hematological: Negative  Psychiatric/Behavioral: Negative  Physical Exam  Physical Exam   Constitutional: She appears well-developed and well-nourished  Eyes: Pupils are equal, round, and reactive to light  EOM and lids are normal  Lids are everted and swept, no foreign bodies found  Right eye exhibits no hordeolum  No foreign body present in the right eye  Left eye exhibits no hordeolum  No foreign body present in the left eye  Right conjunctiva is injected  Right conjunctiva has no hemorrhage  Left conjunctiva is injected  No scleral icterus  Right eye exhibits no nystagmus  Left eye exhibits no nystagmus  No noted pain upon palpation of orbits  No periorbital swelling  No blepharitis or swelling of the blepharus b/l  No noted discharged on exam   No photophobia  Patient had fluorescein stain last time she was here  Cardiovascular: Normal rate, regular rhythm and normal heart sounds     Pulmonary/Chest: Effort normal and breath sounds normal        Vital Signs  ED Triage Vitals [09/26/19 0858]   Temperature Pulse Respirations Blood Pressure SpO2   (!) 97 1 °F (36 2 °C) 77 18 111/68 100 %      Temp Source Heart Rate Source Patient Position - Orthostatic VS BP Location FiO2 (%)   Tympanic Monitor Sitting Left arm --      Pain Score       7           Vitals:    09/26/19 0858   BP: 111/68   Pulse: 77   Patient Position - Orthostatic VS: Sitting         Visual Acuity      ED Medications  Medications - No data to display    Diagnostic Studies  Results Reviewed     None No orders to display              Procedures  Procedures       ED Course                               MDM  Number of Diagnoses or Management Options  Allergic conjunctivitis of both eyes:   Diagnosis management comments: Patient was discharged home with cromolyn sodium drops  She was told also continuing antibiotic drops  I told the patient to call her primary care provider if her symptoms persist or worsen  I reviewed strict indications on a if and when to return to the emergency department including vision loss, double vision, worsening vision, fevers, significant eye swelling or ocular pain  Patient was discharged home stable  Disposition  Final diagnoses: Allergic conjunctivitis of both eyes     Time reflects when diagnosis was documented in both MDM as applicable and the Disposition within this note     Time User Action Codes Description Comment    9/26/2019  9:43 AM Gina EMERY Add [H10 13] Allergic conjunctivitis of both eyes       ED Disposition     ED Disposition Condition Date/Time Comment    Discharge Stable Thu Sep 26, 2019  9:43 AM Marisa Johnston discharge to home/self care              Follow-up Information     Follow up With Specialties Details Why Contact Info    Danilo Velez MD Family Medicine Schedule an appointment as soon as possible for a visit  If symptoms worsen [address]  [address] [facility]  [place]   [address]  [id number]  [phone]            Discharge Medication List as of 9/26/2019  9:44 AM      START taking these medications    Details   cromolyn (OPTICROM) 4 % ophthalmic solution Administer 1 drop to both eyes 4 (four) times a day, Starting Thu 9/26/2019, Print         CONTINUE these medications which have NOT CHANGED    Details   albuterol (PROVENTIL HFA,VENTOLIN HFA) 90 mcg/act inhaler Inhale 2 puffs every 4 (four) hours as needed for wheezing, Starting Thu 3/28/2019, Print      bacitracin-polymyxin b (POLYSPORIN) ophthalmic ointment Administer to the right eye every 4 (four) hours while awake for 7 days, Starting Mon 9/23/2019, Until Mon 9/30/2019, Print      magnesium oxide (MAG-OX) 400 mg Take 1 tablet (400 mg total) by mouth daily for 14 days, Starting Wed 7/24/2019, Until Wed 8/7/2019, Print      ondansetron (ZOFRAN-ODT) 4 mg disintegrating tablet Take 1 tablet (4 mg total) by mouth every 6 (six) hours as needed for nausea or vomiting for up to 2 days, Starting Wed 7/24/2019, Until Fri 7/26/2019, Print      rizatriptan (MAXALT-MLT) 10 MG disintegrating tablet Take 10 mg by mouth once as needed for migraine , Starting Thu 12/6/2018, Until Fri 12/6/2019, Historical Med           No discharge procedures on file      ED Provider  Electronically Signed by           Jessica Olivarez PA-C  09/27/19 4615

## 2019-09-28 ENCOUNTER — HOSPITAL ENCOUNTER (EMERGENCY)
Facility: HOSPITAL | Age: 39
Discharge: HOME | End: 2019-09-28
Attending: EMERGENCY MEDICINE
Payer: COMMERCIAL

## 2019-09-28 VITALS
TEMPERATURE: 98 F | DIASTOLIC BLOOD PRESSURE: 74 MMHG | BODY MASS INDEX: 23.31 KG/M2 | OXYGEN SATURATION: 98 % | HEART RATE: 68 BPM | SYSTOLIC BLOOD PRESSURE: 125 MMHG | RESPIRATION RATE: 18 BRPM | WEIGHT: 167.11 LBS

## 2019-09-28 DIAGNOSIS — H10.9 CONJUNCTIVITIS: Primary | ICD-10-CM

## 2019-09-28 DIAGNOSIS — H10.13 ALLERGIC CONJUNCTIVITIS OF BOTH EYES: ICD-10-CM

## 2019-09-28 PROCEDURE — 99284 EMERGENCY DEPT VISIT MOD MDM: CPT | Performed by: PHYSICIAN ASSISTANT

## 2019-09-28 PROCEDURE — 99282 EMERGENCY DEPT VISIT SF MDM: CPT

## 2019-09-28 RX ORDER — OFLOXACIN 3 MG/ML
1 SOLUTION/ DROPS OPHTHALMIC 4 TIMES DAILY
Qty: 5 ML | Refills: 0 | Status: SHIPPED | OUTPATIENT
Start: 2019-09-28 | End: 2019-12-20

## 2019-09-28 RX ORDER — TETRACAINE HYDROCHLORIDE 5 MG/ML
1 SOLUTION OPHTHALMIC ONCE
Status: COMPLETED | OUTPATIENT
Start: 2019-09-28 | End: 2019-09-28

## 2019-09-28 RX ORDER — OLOPATADINE HYDROCHLORIDE 1 MG/ML
1 SOLUTION/ DROPS OPHTHALMIC 2 TIMES DAILY
Qty: 5 ML | Refills: 0 | Status: SHIPPED | OUTPATIENT
Start: 2019-09-28 | End: 2019-12-20

## 2019-09-28 RX ADMIN — FLUORESCEIN SODIUM 1 STRIP: 1 STRIP OPHTHALMIC at 10:59

## 2019-09-28 RX ADMIN — TETRACAINE HYDROCHLORIDE 1 DROP: 5 SOLUTION OPHTHALMIC at 10:59

## 2019-09-28 NOTE — ED PROVIDER NOTES
History  Chief Complaint   Patient presents with    Eye Redness     b/l eye redness since 9/23 pt reports itching and yellow drainage  pt previosly seen at [facility]     Over the past week and half patient has had discharged started from the right eye and has now spread to the left with irritation and redness  With seen and antibiotic eye and added an allergy I drop Heart records were reviewed  Patient states that she is just not getting better in atrial eye pain but states her eyes feel irritated  Prior to Admission Medications   Prescriptions Last Dose Informant Patient Reported? Taking? albuterol (PROVENTIL HFA,VENTOLIN HFA) 90 mcg/act inhaler   No No   Sig: Inhale 2 puffs every 4 (four) hours as needed for wheezing   magnesium oxide (MAG-OX) 400 mg   No No   Sig: Take 1 tablet (400 mg total) by mouth daily for 14 days   ondansetron (ZOFRAN-ODT) 4 mg disintegrating tablet   No No   Sig: Take 1 tablet (4 mg total) by mouth every 6 (six) hours as needed for nausea or vomiting for up to 2 days   rizatriptan (MAXALT-MLT) 10 MG disintegrating tablet   Yes No   Sig: Take 10 mg by mouth once as needed for migraine       Facility-Administered Medications: None       Past Medical History:   Diagnosis Date    Anemia     [facility] [address] [facility]     Seizures ([facility] [address] )        Past Surgical History:   Procedure Laterality Date    TUBAL LIGATION         History reviewed  No pertinent family history  I have reviewed and agree with the history as documented  Social History     Tobacco Use    Smoking status: Current Every Day Smoker     Packs/day: 0 25    Smokeless tobacco: Never Used   Substance Use Topics    Alcohol use: No    Drug use: No        Review of Systems   All other systems reviewed and are negative  Physical Exam  Physical Exam   Constitutional: She appears well-developed and well-nourished  HENT:   Head: Normocephalic and atraumatic     Right Ear: Tympanic membrane and external ear normal    Left Ear: Tympanic membrane and external ear normal    Mouth/Throat: Oropharynx is clear and moist    Eyes: EOM and lids are normal  Right conjunctiva is injected  Left conjunctiva is injected  Mild conjunctival injection bilaterally with some thick discharge no crusting  See nursing note for visual acuity  Tetracaine relieved any eye irritation bilaterally  No fluorescein uptake or abrasions seen with fluorescein staining  Neck: Neck supple  Cardiovascular: Normal rate, regular rhythm, normal heart sounds and intact distal pulses  Pulmonary/Chest: Effort normal and breath sounds normal    Abdominal: Soft  Bowel sounds are normal    Musculoskeletal: Normal range of motion  Lymphadenopathy:     She has no cervical adenopathy  Neurological: She is alert  Skin: Skin is warm  No rash noted  Psychiatric: She has a normal mood and affect  Her behavior is normal    Nursing note and vitals reviewed  Vital Signs  ED Triage Vitals [09/28/19 1014]   Temperature Pulse Respirations Blood Pressure SpO2   98 °F (36 7 °C) 68 18 125/74 98 %      Temp Source Heart Rate Source Patient Position - Orthostatic VS BP Location FiO2 (%)   Oral Monitor Sitting Right arm --      Pain Score       --           Vitals:    09/28/19 1014   BP: 125/74   Pulse: 68   Patient Position - Orthostatic VS: Sitting         Visual Acuity  Visual Acuity      Most Recent Value   Visual acuity R eye is  20/40 [-1]   Visual acuity Left eye is  20/40 [-1]   Visual acuity in both eyes is  20/30 [-1]   Wearing corrective eyewear/lenses?   Yes          ED Medications  Medications   fluorescein sodium sterile ophthalmic strip 1 strip (1 strip Right Eye Given by Other 9/28/19 1052)   tetracaine 0 5 % ophthalmic solution 1 drop (1 drop Right Eye Given by Other 9/28/19 2740)       Diagnostic Studies  Results Reviewed     None                 No orders to display              Procedures  Procedures       ED Course                               MDM  Number of Diagnoses or Management Options  Allergic conjunctivitis of both eyes: established and worsening  Conjunctivitis: established and worsening  Risk of Complications, Morbidity, and/or Mortality  General comments: Instructions reviewed  Patient Progress  Patient progress: improved      Disposition  Final diagnoses:   Conjunctivitis   Allergic conjunctivitis of both eyes     Time reflects when diagnosis was documented in both MDM as applicable and the Disposition within this note     Time User Action Codes Description Comment    9/28/2019 11:20 AM Rekha Manrique [H10 9] Conjunctivitis     9/28/2019 11:21 AM Rekha Manrique [H10 13] Allergic conjunctivitis of both eyes       ED Disposition     ED Disposition Condition Date/Time Comment    Discharge Stable Sat Sep 28, 2019 11:20 AM Stacy Jerez discharge to home/self care  Follow-up Information     Follow up With Specialties Details Why Contact Info    Anton Chicas MD [facility]   [address]  [address] [facility]  [place] [address]  [address]       [facility]    [address]  [phone]          Discharge Medication List as of 9/28/2019 11:22 AM      START taking these medications    Details   ofloxacin (OCUFLOX) 0 3 % ophthalmic solution Administer 1 drop to both eyes 4 (four) times a day, Starting Sat 9/28/2019, Normal      olopatadine (PATANOL) 0 1 % ophthalmic solution Administer 1 drop to both eyes 2 (two) times a day, Starting Sat 9/28/2019, Normal         CONTINUE these medications which have NOT CHANGED    Details   albuterol (PROVENTIL HFA,VENTOLIN HFA) 90 mcg/act inhaler Inhale 2 puffs every 4 (four) hours as needed for wheezing, Starting Thu 3/28/2019, Print      magnesium oxide (MAG-OX) 400 mg Take 1 tablet (400 mg total) by mouth daily for 14 days, Starting Wed 7/24/2019, Until Wed 8/7/2019, Print      ondansetron (ZOFRAN-ODT) 4 mg disintegrating tablet Take 1 tablet (4 mg total) by mouth every 6 (six) hours as needed for nausea or vomiting for up to 2 days, Starting Wed 7/24/2019, Until Fri 7/26/2019, Print      rizatriptan (MAXALT-MLT) 10 MG disintegrating tablet Take 10 mg by mouth once as needed for migraine , Starting Thu 12/6/2018, Until Fri 12/6/2019, Historical Med           No discharge procedures on file      ED Provider  Electronically Signed by           Cherri Fuentes PA-C  09/28/19 6783

## 2019-10-24 ENCOUNTER — HOSPITAL ENCOUNTER (EMERGENCY)
Facility: HOSPITAL | Age: 39
Discharge: HOME | End: 2019-10-24
Attending: EMERGENCY MEDICINE | Admitting: EMERGENCY MEDICINE
Payer: COMMERCIAL

## 2019-10-24 VITALS
RESPIRATION RATE: 16 BRPM | WEIGHT: 163.58 LBS | DIASTOLIC BLOOD PRESSURE: 72 MMHG | SYSTOLIC BLOOD PRESSURE: 107 MMHG | HEART RATE: 70 BPM | OXYGEN SATURATION: 100 % | BODY MASS INDEX: 22.81 KG/M2 | TEMPERATURE: 96 F

## 2019-10-24 DIAGNOSIS — J02.9 VIRAL PHARYNGITIS: Primary | ICD-10-CM

## 2019-10-24 LAB — S PYO AG THROAT QL: NEGATIVE

## 2019-10-24 PROCEDURE — 99284 EMERGENCY DEPT VISIT MOD MDM: CPT | Performed by: EMERGENCY MEDICINE

## 2019-10-24 PROCEDURE — 87430 STREP A AG IA: CPT | Performed by: EMERGENCY MEDICINE

## 2019-10-24 PROCEDURE — 96372 THER/PROPH/DIAG INJ SC/IM: CPT

## 2019-10-24 PROCEDURE — 99283 EMERGENCY DEPT VISIT LOW MDM: CPT

## 2019-10-24 RX ORDER — IBUPROFEN 600 MG/1
600 TABLET ORAL ONCE
Status: DISCONTINUED | OUTPATIENT
Start: 2019-10-24 | End: 2019-10-24

## 2019-10-24 RX ORDER — DEXAMETHASONE SODIUM PHOSPHATE 4 MG/ML
8 INJECTION, SOLUTION INTRA-ARTICULAR; INTRALESIONAL; INTRAMUSCULAR; INTRAVENOUS; SOFT TISSUE ONCE
Status: COMPLETED | OUTPATIENT
Start: 2019-10-24 | End: 2019-10-24

## 2019-10-24 RX ADMIN — IBUPROFEN 600 MG: 100 SUSPENSION ORAL at 09:51

## 2019-10-24 RX ADMIN — DEXAMETHASONE SODIUM PHOSPHATE 8 MG: 4 INJECTION, SOLUTION INTRAMUSCULAR; INTRAVENOUS at 09:19

## 2019-10-24 NOTE — ED PROVIDER NOTES
History  Chief Complaint   Patient presents with    Sore Throat     Reports sore throat since yesterday, pain when swallowing  No OTC meds  Denies fevers or chills  HPI    This is a very pleasant 35-year-old female presents to the emergency department with a chief complaint of a sore throat which is described as scratchy  Patient denies any fever  Patient took Tylenol last evening  Patient denies any recent sick contacts or recent travel outside the geographical area  Patient is not having trouble swallowing  Patient denies any shortness of breath chest pain nausea vomiting diarrhea  Prior to Admission Medications   Prescriptions Last Dose Informant Patient Reported? Taking? albuterol (PROVENTIL HFA,VENTOLIN HFA) 90 mcg/act inhaler Past Month at Unknown time  No Yes   Sig: Inhale 2 puffs every 4 (four) hours as needed for wheezing   ofloxacin (OCUFLOX) 0 3 % ophthalmic solution Not Taking at Unknown time  No No   Sig: Administer 1 drop to both eyes 4 (four) times a day   Patient not taking: Reported on 10/24/2019   olopatadine (PATANOL) 0 1 % ophthalmic solution Not Taking at Unknown time  No No   Sig: Administer 1 drop to both eyes 2 (two) times a day   Patient not taking: Reported on 10/24/2019   rizatriptan (MAXALT-MLT) 10 MG disintegrating tablet Not Taking at Unknown time  Yes No   Sig: Take 10 mg by mouth once as needed for migraine       Facility-Administered Medications: None       Past Medical History:   Diagnosis Date    Anemia     [facility] [address] [facility]     [facility]        Past Surgical History:   Procedure Laterality Date    TUBAL LIGATION         History reviewed  No pertinent family history  I have reviewed and agree with the history as documented      Social History     Tobacco Use    Smoking status: Current Every Day Smoker     Packs/day: 0 25    Smokeless tobacco: Never Used   Substance Use Topics    Alcohol use: No    Drug use: No        Review of Systems   Constitutional: Negative  HENT: Positive for congestion and sore throat  Eyes: Negative  Respiratory: Positive for cough  Cardiovascular: Negative  Gastrointestinal: Negative  Endocrine: Negative  Genitourinary: Negative  Musculoskeletal: Negative  Skin: Negative  Allergic/Immunologic: Negative  Neurological: Negative  Hematological: Negative  Psychiatric/Behavioral: Negative  Physical Exam  Physical Exam   Constitutional: She is oriented to person, place, and time  She appears well-developed and well-nourished  HENT:   Head: Normocephalic and atraumatic  Right Ear: External ear normal    Left Ear: External ear normal    Nose: Nose normal    Mouth/Throat: Uvula is midline  Posterior oropharyngeal erythema present  Eyes: Pupils are equal, round, and reactive to light  Conjunctivae and EOM are normal    Neck: Normal range of motion  Neck supple  Cardiovascular: Normal rate, regular rhythm, normal heart sounds and intact distal pulses  Pulmonary/Chest: Effort normal and breath sounds normal    Abdominal: Soft  Bowel sounds are normal    Genitourinary:   Genitourinary Comments: Exam deferred  Musculoskeletal: Normal range of motion  Neurological: She is alert and oriented to person, place, and time  Skin: Skin is warm and dry  Capillary refill takes less than 2 seconds  Psychiatric: She has a normal mood and affect  Her behavior is normal    Nursing note and vitals reviewed        Vital Signs  ED Triage Vitals   Temperature Pulse Respirations Blood Pressure SpO2   10/24/19 0854 10/24/19 0854 10/24/19 0854 10/24/19 0854 10/24/19 0854   (!) 96 °F (35 6 °C) 65 14 113/66 100 %      Temp Source Heart Rate Source Patient Position - Orthostatic VS BP Location FiO2 (%)   10/24/19 0854 -- 10/24/19 0854 10/24/19 0854 --   Tympanic  Sitting Left arm       Pain Score       10/24/19 0919       8           Vitals:    10/24/19 0854   BP: 113/66   Pulse: 65   Patient Position - Orthostatic VS: Sitting         Visual Acuity      ED Medications  Medications   dexamethasone (DECADRON) injection 8 mg (8 mg Intramuscular Given 10/24/19 0919)   ibuprofen (MOTRIN) oral suspension 600 mg (600 mg Oral Given 10/24/19 0951)       Diagnostic Studies  Results Reviewed     Procedure Component Value Units Date/Time    Rapid Strep A Screen Only, Adults [[id number]]  (Normal) Collected:  10/24/19 0918    Lab Status:  Final result Specimen:  Throat Updated:  10/24/19 0947     Rapid Strep A Screen Negative                 No orders to display              Procedures  Procedures       ED Course                               MDM  Number of Diagnoses or Management Options  Viral pharyngitis:   Diagnosis management comments: 77-year-old female presents emergency department with sore throat described as scratchy  Patient also knows she is in nasal congestion  Patient has not had any fever chills at home  Patient has no recent travel outside the geographical area last 90 days or sick contacts  Strep screen negative  Patient has no exudates on the tonsils is chest empirically treat the patient for strep throat  Patient received Motrin and Decadron prior to discharge  Patient stable nontoxic-appearing  Lungs are clear and vital signs are stable  Amount and/or Complexity of Data Reviewed  Clinical lab tests: ordered  Tests in the radiology section of CPT®: ordered  Tests in the medicine section of CPT®: ordered        Disposition  Final diagnoses:   Viral pharyngitis     Time reflects when diagnosis was documented in both MDM as applicable and the Disposition within this note     Time User Action Codes Description Comment    10/24/2019 10:16 AM Estefania Leonardo Add [J02 9] Viral pharyngitis       ED Disposition     ED Disposition Condition Date/Time Comment    Discharge Stable Thu Oct 24, 2019 10:16 AM Marisa Johnston discharge to home/self care              Follow-up Information     Follow up With Specialties Details Why Contact Info    Teddy Pitt MD [address]  [phone]            Patient's Medications   Discharge Prescriptions    No medications on file     No discharge procedures on file      ED Provider  Electronically Signed by           Roland Becerra III, DO  10/24/19 1016

## 2019-12-16 ENCOUNTER — HOSPITAL ENCOUNTER (EMERGENCY)
Facility: HOSPITAL | Age: 39
Discharge: HOME | End: 2019-12-16
Attending: EMERGENCY MEDICINE
Payer: COMMERCIAL

## 2019-12-16 ENCOUNTER — APPOINTMENT (EMERGENCY)
Dept: RADIOLOGY | Facility: HOSPITAL | Age: 39
End: 2019-12-16
Payer: COMMERCIAL

## 2019-12-16 VITALS
WEIGHT: 161.6 LBS | RESPIRATION RATE: 16 BRPM | DIASTOLIC BLOOD PRESSURE: 70 MMHG | TEMPERATURE: 98.1 F | OXYGEN SATURATION: 100 % | HEART RATE: 75 BPM | BODY MASS INDEX: 22.54 KG/M2 | SYSTOLIC BLOOD PRESSURE: 108 MMHG

## 2019-12-16 DIAGNOSIS — R22.41 KNEE MASS, RIGHT: Primary | ICD-10-CM

## 2019-12-16 PROCEDURE — 99283 EMERGENCY DEPT VISIT LOW MDM: CPT

## 2019-12-16 PROCEDURE — 99282 EMERGENCY DEPT VISIT SF MDM: CPT | Performed by: PHYSICIAN ASSISTANT

## 2019-12-16 PROCEDURE — 73564 X-RAY EXAM KNEE 4 OR MORE: CPT

## 2019-12-16 RX ORDER — LIDOCAINE 50 MG/G
1 PATCH TOPICAL DAILY
Qty: 10 PATCH | Refills: 0 | Status: SHIPPED | OUTPATIENT
Start: 2019-12-16 | End: 2020-01-29

## 2019-12-19 NOTE — ED PROVIDER NOTES
History  Chief Complaint   Patient presents with    Knee Pain     pt c/o right knee pain that started a week and a half ago  pt unsure if bumped knee into something pt states she feels a knot and painful when doing activities  44year old female presents today complaining of right knee pain  The pain is worse with range of motion and weight-bearing  She denies fevers, redness but admits to swelling  Denies injury  She has been taking motrin for the pain with moderate relief  No history of knee pain  Prior to Admission Medications   Prescriptions Last Dose Informant Patient Reported? Taking? albuterol (PROVENTIL HFA,VENTOLIN HFA) 90 mcg/act inhaler   No Yes   Sig: Inhale 2 puffs every 4 (four) hours as needed for wheezing   ofloxacin (OCUFLOX) 0 3 % ophthalmic solution Not Taking at Unknown time  No No   Sig: Administer 1 drop to both eyes 4 (four) times a day   Patient not taking: Reported on 10/24/2019   olopatadine (PATANOL) 0 1 % ophthalmic solution Not Taking at Unknown time  No No   Sig: Administer 1 drop to both eyes 2 (two) times a day   Patient not taking: Reported on 10/24/2019   rizatriptan (MAXALT-MLT) 10 MG disintegrating tablet   Yes No   Sig: Take 10 mg by mouth once as needed for migraine       Facility-Administered Medications: None       Past Medical History:   Diagnosis Date    Anemia     [facility] [address] [facility]     [facility]ures ([facility] [address] )        Past Surgical History:   Procedure Laterality Date    TUBAL LIGATION         History reviewed  No pertinent family history  I have reviewed and agree with the history as documented  Social History     Tobacco Use    Smoking status: Current Every Day Smoker     Packs/day: 0 25    Smokeless tobacco: Never Used   Substance Use Topics    Alcohol use: No    Drug use: No        Review of Systems   Musculoskeletal: Positive for arthralgias and joint swelling  All other systems reviewed and are negative        Physical Exam  Physical Exam Constitutional: She appears well-developed and well-nourished  No distress  HENT:   Head: Normocephalic and atraumatic  Cardiovascular: Normal rate  Pulmonary/Chest: No respiratory distress  Musculoskeletal:   2cm mobile soft tissue mass R knee  No erythema, warmth or tenderness to palpation  Full range of motion right knee  Distal pulses and sensation intact  Neurological: She is alert  Skin: Skin is warm and dry  Capillary refill takes less than 2 seconds  She is not diaphoretic  No erythema  Psychiatric: She has a normal mood and affect  Her behavior is normal        Vital Signs  ED Triage Vitals [12/16/19 1111]   Temperature Pulse Respirations Blood Pressure SpO2   98 1 °F (36 7 °C) 75 16 108/70 100 %      Temp Source Heart Rate Source Patient Position - Orthostatic VS BP Location FiO2 (%)   Temporal Monitor Sitting Right arm --      Pain Score       6           Vitals:    12/16/19 1111   BP: 108/70   Pulse: 75   Patient Position - Orthostatic VS: Sitting         Visual Acuity      ED Medications  Medications - No data to display    Diagnostic Studies  Results Reviewed     None                 XR knee 4+ vw right injury   Final Result by Zachariah Rahman MD (12/16 1410)      No acute osseous abnormality  Workstation performed: [id number]                    Procedures  Procedures         ED Course                               MDM  Number of Diagnoses or Management Options  Knee mass, right:   Diagnosis management comments: Firm, mobile soft tissue mass which seems to be extracapsular  Advised to follow-up with ortho          Disposition  Final diagnoses:   Knee mass, right     Time reflects when diagnosis was documented in both MDM as applicable and the Disposition within this note     Time User Action Codes Description Comment    12/16/2019 12:08 PM Travis Espinoza Add [R22 41] Knee mass, right       ED Disposition     ED Disposition Condition Date/Time Comment    Discharge Stable Mon Dec 16, 2019 12:08 PM Payam Larson discharge to home/self care  Follow-up Information     Follow up With Specialties Details Why Contact Info Additional Information    Powell Gitelman, MD Family Medicine Schedule an appointment as soon as possible for a visit   [address]  [address] [facility]  Frederic BARROSO  [phone]  [phone]       M[address] Orthopedic Surgery Schedule an appointment as soon as possible for a visit   [address] [address] [phone] [address], [address] [facility] [address] [facility] [address], [address] [place], [place], [address] [phone]          Discharge Medication List as of 12/16/2019 12:09 PM      START taking these medications    Details   lidocaine (LIDODERM) 5 % Apply 1 patch topically daily Remove & Discard patch within 12 hours or as directed by MD, Starting Mon 12/16/2019, Print         CONTINUE these medications which have NOT CHANGED    Details   albuterol (PROVENTIL HFA,VENTOLIN HFA) 90 mcg/act inhaler Inhale 2 puffs every 4 (four) hours as needed for wheezing, Starting Thu 3/28/2019, Print      ofloxacin (OCUFLOX) 0 3 % ophthalmic solution Administer 1 drop to both eyes 4 (four) times a day, Starting Sat 9/28/2019, Normal      olopatadine (PATANOL) 0 1 % ophthalmic solution Administer 1 drop to both eyes 2 (two) times a day, Starting Sat 9/28/2019, Normal      rizatriptan (MAXALT-MLT) 10 MG disintegrating tablet Take 10 mg by mouth once as needed for migraine , Starting Thu 12/6/2018, Until Fri 12/6/2019, Historical Med           No discharge procedures on file      ED Provider  Electronically Signed by           Nam Moody PA-C  12/19/19 6378

## 2019-12-20 ENCOUNTER — HOSPITAL ENCOUNTER (EMERGENCY)
Facility: HOSPITAL | Age: 39
Discharge: HOME | End: 2019-12-20
Attending: EMERGENCY MEDICINE | Admitting: EMERGENCY MEDICINE
Payer: COMMERCIAL

## 2019-12-20 VITALS
DIASTOLIC BLOOD PRESSURE: 73 MMHG | RESPIRATION RATE: 16 BRPM | TEMPERATURE: 97.9 F | BODY MASS INDEX: 22.48 KG/M2 | HEART RATE: 66 BPM | SYSTOLIC BLOOD PRESSURE: 117 MMHG | OXYGEN SATURATION: 100 % | WEIGHT: 161.16 LBS

## 2019-12-20 DIAGNOSIS — L72.9 CYST OF SKIN: Primary | ICD-10-CM

## 2019-12-20 DIAGNOSIS — S81.019A LACERATION OF KNEE: ICD-10-CM

## 2019-12-20 PROCEDURE — 99283 EMERGENCY DEPT VISIT LOW MDM: CPT

## 2019-12-20 PROCEDURE — 12001 RPR S/N/AX/GEN/TRNK 2.5CM/<: CPT | Performed by: EMERGENCY MEDICINE

## 2019-12-20 PROCEDURE — 99283 EMERGENCY DEPT VISIT LOW MDM: CPT | Performed by: EMERGENCY MEDICINE

## 2019-12-20 RX ORDER — IBUPROFEN 800 MG/1
800 TABLET ORAL 3 TIMES DAILY
Qty: 21 TABLET | Refills: 0 | Status: SHIPPED | OUTPATIENT
Start: 2019-12-20 | End: 2022-02-08 | Stop reason: HOSPADM

## 2019-12-20 RX ORDER — IBUPROFEN 400 MG/1
800 TABLET ORAL ONCE
Status: COMPLETED | OUTPATIENT
Start: 2019-12-20 | End: 2019-12-20

## 2019-12-20 RX ORDER — LIDOCAINE HYDROCHLORIDE AND EPINEPHRINE 10; 10 MG/ML; UG/ML
5 INJECTION, SOLUTION INFILTRATION; PERINEURAL ONCE
Status: COMPLETED | OUTPATIENT
Start: 2019-12-20 | End: 2019-12-20

## 2019-12-20 RX ADMIN — IBUPROFEN 800 MG: 400 TABLET ORAL at 10:48

## 2019-12-20 RX ADMIN — LIDOCAINE HYDROCHLORIDE AND EPINEPHRINE 5 ML: 10; 10 INJECTION, SOLUTION INFILTRATION; PERINEURAL at 09:45

## 2019-12-20 NOTE — ED PROVIDER NOTES
History  Chief Complaint   Patient presents with    Knee Pain     Pt with known cyst to R knee, herre on 12/16 for eval of same  Pt fell this past week and cyst has now opened  Patient is a 78-year-old female presenting to the emergency department for evaluation of right knee pain  Patient was seen at [address] [place] [address] ED on 12/16 for cyst to right knee  Pt had negative x-ray right knee  Pt was advised to f/u with Orthopedics for re-evaluation  Pt never followed-up  Pt states yesterday she slipped on ice, onto her right knee, sustaining 1 cm laceration over right knee cyst area  Patient cleaned her wound  Tetanus UTD (12/6/2018)  Patient denies head injury, loss of consciousness ankle pain, hip pain  Prior to Admission Medications   Prescriptions Last Dose Informant Patient Reported? Taking? albuterol (PROVENTIL HFA,VENTOLIN HFA) 90 mcg/act inhaler   No Yes   Sig: Inhale 2 puffs every 4 (four) hours as needed for wheezing   lidocaine (LIDODERM) 5 %   No Yes   Sig: Apply 1 patch topically daily Remove & Discard patch within 12 hours or as directed by MD      Facility-Administered Medications: None       Past Medical History:   Diagnosis Date    Anemia     [facility] [address] [facility]     Seizures ([facility] [address] )        Past Surgical History:   Procedure Laterality Date    TUBAL LIGATION         History reviewed  No pertinent family history  I have reviewed and agree with the history as documented  Social History     Tobacco Use    Smoking status: Current Every Day Smoker     Packs/day: 0 25    Smokeless tobacco: Never Used   Substance Use Topics    Alcohol use: No    Drug use: No        Review of Systems   Musculoskeletal:        Knee pain   Skin: Positive for wound  All other systems reviewed and are negative  Physical Exam  Physical Exam   Constitutional: She is oriented to person, place, and time  She appears well-developed and well-nourished  HENT:   Head: Normocephalic and atraumatic     Right Ear: External ear normal    Left Ear: External ear normal    Nose: Nose normal    Eyes: Conjunctivae are normal    Neck: Normal range of motion  Neck supple  Cardiovascular: Normal rate, regular rhythm and intact distal pulses  Pulmonary/Chest: Effort normal and breath sounds normal  No respiratory distress  She has no wheezes  She has no rales  Abdominal: Soft  She exhibits no distension  Musculoskeletal: Normal range of motion  She exhibits no tenderness  Right knee: She exhibits normal range of motion (full ROM with mild pain)  Legs:  Neurovascularly intact distally  5/5 strength bilateral lower extremities  Distal pulses intact  Cap refill <2 seconds  Sensation intact  Neurological: She is alert and oriented to person, place, and time  Skin: Skin is warm and dry  No rash noted  Psychiatric: She has a normal mood and affect  Her behavior is normal        Vital Signs  ED Triage Vitals [12/20/19 0904]   Temperature Pulse Respirations Blood Pressure SpO2   97 9 °F (36 6 °C) 66 16 117/73 100 %      Temp Source Heart Rate Source Patient Position - Orthostatic VS BP Location FiO2 (%)   Oral -- Sitting Right arm --      Pain Score       8           Vitals:    12/20/19 0904   BP: 117/73   Pulse: 66   Patient Position - Orthostatic VS: Sitting         Visual Acuity      ED Medications  Medications   lidocaine-epinephrine (XYLOCAINE/EPINEPHRINE) 1 %-1:100,000 injection 5 mL (5 mL Infiltration Given 12/20/19 0945)   ibuprofen (MOTRIN) tablet 800 mg (800 mg Oral Given 12/20/19 1048)       Diagnostic Studies  Results Reviewed     None                 No orders to display              Procedures  Laceration repair  Date/Time: 12/20/2019 10:52 AM  Performed by: Christiana Carreno PA-C  Authorized by: Christiana Carreno PA-C   Consent: Verbal consent obtained    Risks and benefits: risks, benefits and alternatives were discussed  Consent given by: patient  Patient identity confirmed: verbally with patient  Body area: lower extremity  Location details: right knee  Laceration length: 1 cm  Foreign bodies: no foreign bodies  Tendon involvement: none  Nerve involvement: none  Vascular damage: no  Anesthesia: local infiltration    Anesthesia:  Local Anesthetic: lidocaine 1% with epinephrine  Anesthetic total: 3 mL      Procedure Details:  Preparation: Patient was prepped and draped in the usual sterile fashion  Irrigation solution: saline  Irrigation method: syringe  Amount of cleaning: standard  Debridement: none  Degree of undermining: none  Skin closure: 4-0 nylon  Number of sutures: 3  Technique: simple  Approximation: close  Approximation difficulty: simple  Dressing: antibiotic ointment and gauze roll  Patient tolerance: Patient tolerated the procedure well with no immediate complications               ED Course                               MDM  Number of Diagnoses or Management Options  Cyst of skin: new and requires workup  Laceration of knee: new and does not require workup  Diagnosis management comments: Patient is a 66-year-old female presenting to the emergency department for evaluation of right knee pain  Patient was seen at [address] [place] [address] ED on 12/16 for cyst to right knee  Pt had negative x-ray right knee  Pt was advised to f/u with Orthopedics for re-evaluation  Pt never followed-up  Pt states yesterday she slipped on ice, onto her right knee, sustaining 1 cm laceration over right knee cyst area  Patient cleaned her wound  Tetanus UTD (12/6/2018)  Suspect encapsulated cyst  Laceration was cleaned, repaired with 3 sutures and dressed with abx ointment and gauze roll  Information for general surgery given to pt and advised to f/u with encapsulated cystic structure that will continue to come back if capsule not removed surgically  Instructed pt to f/u with PCP or Urgent Care for suture removal in 8-10 days  Pt verbalizes understanding and agrees with plan   The management plan was discussed in detail with the patient at bedside and all questions were answered  Prior to discharge, I provided both verbal and written instructions  I discussed with the patient the signs and symptoms for which to return to the emergency department  All questions were answered and patient was comfortable with the plan of care and discharged to home  The patient verbalized understanding of our discussion and plan of care, and agrees to return to the Emergency Department for concerns and progression of illness  Disposition  Final diagnoses:   Cyst of skin   Laceration of knee     Time reflects when diagnosis was documented in both MDM as applicable and the Disposition within this note     Time User Action Codes Description Comment    12/20/2019 10:49 AM Walter Rosado Add [L72 9] Cyst of skin     12/20/2019 10:49 AM Walter Rosado Add [S81 019A] Laceration of knee       ED Disposition     ED Disposition Condition Date/Time Comment    Discharge Stable Fri Dec 20, 2019 10:47 AM Rocio Bolden discharge to home/self care              Follow-up Information     Follow up With Specialties Details Why Contact Info Additional Information    [facility] General Surgery [place] at [facility] Surgery Schedule an appointment as soon as possible for a visit   [id number] [place] [address] Medical [facility] [address]  [address] [place] [address], [address] [place] [address]       [phone]          Discharge Medication List as of 12/20/2019 10:51 AM      START taking these medications    Details   ibuprofen (MOTRIN) 800 mg tablet Take 1 tablet (800 mg total) by mouth 3 (three) times a day, Starting Fri 12/20/2019, Print         CONTINUE these medications which have NOT CHANGED    Details   albuterol (PROVENTIL HFA,VENTOLIN HFA) 90 mcg/act inhaler Inhale 2 puffs every 4 (four) hours as needed for wheezing, Starting Thu 3/28/2019, Print      lidocaine (LIDODERM) 5 % Apply 1 patch topically daily Remove & Discard patch within 12 hours or as directed by MD, Starting Mon 12/16/2019, Print           No discharge procedures on file      ED Provider  Electronically Signed by           Cesar Ramos PA-C  12/20/19 0494

## 2019-12-23 ENCOUNTER — OFFICE VISIT (OUTPATIENT)
Dept: FAMILY MEDICINE CLINIC | Facility: CLINIC | Age: 39
End: 2019-12-23

## 2019-12-23 VITALS
DIASTOLIC BLOOD PRESSURE: 64 MMHG | WEIGHT: 161 LBS | BODY MASS INDEX: 22.45 KG/M2 | TEMPERATURE: 98 F | SYSTOLIC BLOOD PRESSURE: 106 MMHG | OXYGEN SATURATION: 99 % | RESPIRATION RATE: 16 BRPM | HEART RATE: 78 BPM

## 2019-12-23 DIAGNOSIS — L72.0 CYST OF SKIN AND SUBCUTANEOUS TISSUE: Primary | ICD-10-CM

## 2019-12-23 PROCEDURE — 99203 OFFICE O/P NEW LOW 30 MIN: CPT | Performed by: INTERNAL MEDICINE

## 2019-12-23 NOTE — PROGRESS NOTES
Assessment/Plan:    Cyst of skin and subcutaneous tissue  Patient had a cyst of the right knee, subcutaneous tissue  Patient fell went to the emergency department had a sutured  She would like to have this cyst removed  At this time does not appear to be infected, erythematous, swollen  - will refer to surgery to have the capsule removed  -  Patient has 3 sutures in place, will remove at this time  Diagnoses and all orders for this visit:    Cyst of skin and subcutaneous tissue  -     Ambulatory referral to General Surgery; Future          Subjective:      Patient ID: Mili Michel is a 44 y o  female  Patient   Daisy Bibles 3 days ago and could open her right knee  She went to the emergency department had it sutured was told that she had a cyst underneath  Has been bothering her and she would like to have the cyst and the capsule removed  At this time there is no tenderness, erythematous, swelling, or signs of infections  Patient has no fever chills  Patient is new to the clinic will have patient F/U in 3-4 weeks to establish care  The following portions of the patient's history were reviewed and updated as appropriate: allergies, current medications, past family history, past medical history, past social history, past surgical history and problem list     Review of Systems   Constitutional: Negative for chills and fever  HENT: Negative for congestion and sore throat  Eyes: Negative for visual disturbance  Respiratory: Negative for wheezing  Cardiovascular: Negative for chest pain  Gastrointestinal: Negative for abdominal pain, blood in stool and nausea  Endocrine: Negative for cold intolerance and heat intolerance  Genitourinary: Negative for dysuria and hematuria  Musculoskeletal: Negative for gait problem  Skin: Negative for rash  Allergic/Immunologic: Negative for environmental allergies  Neurological: Negative for syncope     Hematological: Does not bruise/bleed easily  Psychiatric/Behavioral: Negative for behavioral problems  Objective:      /64 (BP Location: Right arm, Patient Position: Sitting, Cuff Size: Standard)   Pulse 78   Temp 98 °F (36 7 °C) (Temporal)   Resp 16   Wt 73 kg (161 lb)   SpO2 99%   BMI 22 45 kg/m²          Physical Exam   Constitutional: She is oriented to person, place, and time  She appears well-developed and well-nourished  No distress  HENT:   Head: Normocephalic and atraumatic  Right Ear: External ear normal    Left Ear: External ear normal    Nose: Nose normal    Mouth/Throat: Oropharynx is clear and moist    Eyes: Pupils are equal, round, and reactive to light  Conjunctivae and EOM are normal  Right eye exhibits no discharge  Left eye exhibits no discharge  Neck: Normal range of motion  Neck supple  No JVD present  No tracheal deviation present  No thyromegaly present  Cardiovascular: Normal rate, regular rhythm, normal heart sounds and intact distal pulses  Exam reveals no gallop and no friction rub  No murmur heard  Pulmonary/Chest: Effort normal and breath sounds normal  No respiratory distress  She has no wheezes  She exhibits no tenderness  Abdominal: Soft  Bowel sounds are normal  She exhibits no distension  There is no tenderness  There is no rebound  Musculoskeletal: Normal range of motion  She exhibits no edema or tenderness  Neurological: She is alert and oriented to person, place, and time  She has normal reflexes  Coordination normal    Skin: Skin is warm and dry  No rash noted  She is not diaphoretic  No erythema  Psychiatric: She has a normal mood and affect  Her behavior is normal  Thought content normal    Nursing note and vitals reviewed

## 2019-12-23 NOTE — ASSESSMENT & PLAN NOTE
Patient had a cyst of the right knee, subcutaneous tissue  Patient fell went to the emergency department had a sutured  She would like to have this cyst removed  At this time does not appear to be infected, erythematous, swollen  - will refer to surgery to have the capsule removed  -  Patient has 3 sutures in place, will remove at this time

## 2019-12-24 ENCOUNTER — TELEPHONE (OUTPATIENT)
Dept: FAMILY MEDICINE CLINIC | Facility: CLINIC | Age: 39
End: 2019-12-24

## 2019-12-24 NOTE — TELEPHONE ENCOUNTER
Attempted to schedule with general surgery to have cyst removed after speaking with he office they said because of the cyst being on the knee patient would need to see ortho  Could you please place and order for ortho and I will schedule patient and appointment    Thank You

## 2019-12-26 DIAGNOSIS — L72.0 CYST OF SKIN AND SUBCUTANEOUS TISSUE: Primary | ICD-10-CM

## 2019-12-31 ENCOUNTER — OFFICE VISIT (OUTPATIENT)
Dept: OBGYN CLINIC | Facility: MEDICAL CENTER | Age: 39
End: 2019-12-31
Payer: COMMERCIAL

## 2019-12-31 VITALS
DIASTOLIC BLOOD PRESSURE: 62 MMHG | HEIGHT: 71 IN | BODY MASS INDEX: 23.8 KG/M2 | WEIGHT: 170 LBS | SYSTOLIC BLOOD PRESSURE: 105 MMHG

## 2019-12-31 DIAGNOSIS — L72.0 CYST OF SKIN AND SUBCUTANEOUS TISSUE: ICD-10-CM

## 2019-12-31 PROCEDURE — 99243 OFF/OP CNSLTJ NEW/EST LOW 30: CPT | Performed by: ORTHOPAEDIC SURGERY

## 2019-12-31 NOTE — PROGRESS NOTES
Orthopaedic Surgery Note    CC: Right Knee Pain      HPI:  Lulu Ramey is a 44 y  o female with a history of fall after slipping in the snow back in early December  She reports taht she scraped the knee during the fall and had an abrasion over the anterior aspect of the knee  She went to the ED about 1 week following for evaluation  No fracture or dislocation was noted  She returned a few days later and it seems from the patient history and notes that a procedure was performed to explore the area and there was no obvious foreign body or purulence expressed  At this point in time she is having knee pain and she is concerned about this mass  It does not have any drainage  No erythema  ALLERGIES:  No Known Allergies    CURRENT MEDICATIONS:  Current Outpatient Medications   Medication Sig Dispense Refill    albuterol (PROVENTIL HFA,VENTOLIN HFA) 90 mcg/act inhaler Inhale 2 puffs every 4 (four) hours as needed for wheezing 1 Inhaler 0    ibuprofen (MOTRIN) 800 mg tablet Take 1 tablet (800 mg total) by mouth 3 (three) times a day 21 tablet 0    lidocaine (LIDODERM) 5 % Apply 1 patch topically daily Remove & Discard patch within 12 hours or as directed by MD (Patient not taking: Reported on 12/23/2019) 10 patch 0     No current facility-administered medications for this visit  PAST MEDICAL HISTORY  Past Medical History:   Diagnosis Date    Anemia     Seizure ([address]     Se[facility]ures ([facility] [address] [facility]        SURGICAL HISTORY  Past Surgical History:   Procedure Laterality Date    TUBAL LIGATION         FAMILY HISTORY  History reviewed  No pertinent family history      SOCIAL HISTORY  Social History     Socioeconomic History    Marital status: /Civil Union     Spouse name: Not on file    Number of children: Not on file    Years of education: Not on file    Highest education level: Not on file   Occupational History    Not on file   Social Needs    Financial resource strain: Not on file   [facility] Food insecurity:     Worry: Not on file     Inability: Not on file    Transportation needs:     Medical: Not on file     Non-medical: Not on file   Tobacco Use    Smoking status: Current Every Day Smoker     Packs/day: 0 25    Smokeless tobacco: Never Used   Substance and Sexual Activity    Alcohol use: No    Drug use: No    Sexual activity: Not on file   Lifestyle    Physical activity:     Days per week: Not on file     Minutes per session: Not on file    Stress: Not on file   Relationships    Social connections:     Talks on phone: Not on file     Gets together: Not on file     Attends Hoahaoism service: Not on file     Active member of club or organization: Not on file     Attends meetings of clubs or organizations: Not on file     Relationship status: Not on file    Intimate partner violence:     Fear of current or ex partner: Not on file     Emotionally abused: Not on file     Physically abused: Not on file     Forced sexual activity: Not on file   Other Topics Concern    Not on file   Social History Narrative    Not on file         Review of Systems   Negative except per above and HPI  Physical Exam    Vitals  Vitals:    12/31/19 1243   BP: 105/62       BMI  Body mass index is 23 71 kg/m²  GENERAL: No acute distress  Alert and oriented  Well nourished and well hydrated  Appears stated age  HEENT : Normocephalic, atraumatic  Extraocular movements intact  Mucous membranes moist  NECK: Supple, trachea midline  LUNGS: Adequate and symmetric respiratory effort  No intercostal retractions or accessory muscle use  HEART: Extremities warm and perfused  ABDOMEN: Nondistended  SKIN: Warm and dry, no rash  Right Knee   Inspection/Appearance:       Swelling: No      Patella is midline  There is a small mass overlying the lateral aspect of the anterior knee  It is mobile and located just lateral to the patellar tendon  There appears to be a healed incision or abrasion overlying the mass  There is no drainage expressed with pressure  No erythema  Alignment:  Knee is in neutral  Palpation - Soft Tissue: normal without effusion  Mass palpated and it appears to be fluid filled, gets larger with knee extension  ROM:       Extension - 0          Flexion - 130      extensor lag: no    Stability:  demonstrates no varus, valgus, anterior drawer or posterior drawer  Patella: stable, tracks normally  Motor:        5/5 quadriceps       5/5 hamstrings  Vascular: Knee is warm and sensate with normal refill  Imaging  A) Imaging modality available  Radiographs: yes  MRI scan: no  CT scan: no    B) Imaging findings  Subchondral cysts: no  Subchondral sclerosis: no  Periarticular osteophytes: no  Joint subluxation: no  Joint space narrowing: no  Bone-on-bone articulations: no  Avascular necrosis: no    No obvious foreign body noted in anterolateral knee  Assessment and Plan  Mass Right Knee    - at this point in time I think that she most likely has a foreign body that is now encapsulated  It could also be a parameniscal cyst or something else  I would like an MRI to better assess the size of the lesion as well as to evaluate whether it communicates with the joint or whether any foreign body is present  I would like the patient to return to the office following the MRI to review the results and to discuss further mgmt  In the meantime, I counseled her regarding return criteria (purulent drainage, high fever)  I also discussed options for sxs control including tylenol, nsaids, ice/heat, rest, compression  Rosy Richards MD  Department of [place] [address]  12:58 PM

## 2019-12-31 NOTE — LETTER
December 31, 2019     Patient: Gary Garcia   YOB: 1980   Date of Visit: 12/31/2019       To Whom it May Concern:    Gary Garcia is under my professional care  She was seen in my office on 12/31/2019  She may return to work on 12/31/2019  If you have any questions or concerns, please don't hesitate to call           Sincerely,          Julius Gutierrez MD        CC: Gary Juankevin

## 2020-01-06 ENCOUNTER — HOSPITAL ENCOUNTER (EMERGENCY)
Facility: HOSPITAL | Age: 40
Discharge: HOME | End: 2020-01-06
Attending: EMERGENCY MEDICINE
Payer: COMMERCIAL

## 2020-01-06 VITALS
OXYGEN SATURATION: 100 % | HEART RATE: 71 BPM | DIASTOLIC BLOOD PRESSURE: 63 MMHG | WEIGHT: 156.31 LBS | BODY MASS INDEX: 21.8 KG/M2 | SYSTOLIC BLOOD PRESSURE: 107 MMHG | RESPIRATION RATE: 16 BRPM | TEMPERATURE: 99.1 F

## 2020-01-06 DIAGNOSIS — Z51.89 VISIT FOR WOUND CHECK: ICD-10-CM

## 2020-01-06 DIAGNOSIS — L72.9 SKIN CYST: Primary | ICD-10-CM

## 2020-01-06 PROCEDURE — 99282 EMERGENCY DEPT VISIT SF MDM: CPT | Performed by: EMERGENCY MEDICINE

## 2020-01-06 PROCEDURE — 99282 EMERGENCY DEPT VISIT SF MDM: CPT

## 2020-01-06 RX ORDER — BACITRACIN, NEOMYCIN, POLYMYXIN B 400; 3.5; 5 [USP'U]/G; MG/G; [USP'U]/G
1 OINTMENT TOPICAL ONCE
Status: COMPLETED | OUTPATIENT
Start: 2020-01-06 | End: 2020-01-06

## 2020-01-06 RX ADMIN — BACITRACIN, NEOMYCIN, POLYMYXIN B 1 SMALL APPLICATION: 400; 3.5; 5 OINTMENT TOPICAL at 11:45

## 2020-01-06 NOTE — ED PROVIDER NOTES
History  Chief Complaint   Patient presents with    Wound Check     pt had cyst to right knee with recent suture removal   noted yesterday it was leaking, wants to make sure there is no infection  does have MRI scheduled for tomorrow  Patient is a cystic structure on knee and she has seen Orthopedics and is scheduled to have an MRI  She states yesterday all day at work it leaking some clear pink fluid and she wanted to get it checked because she did want to get infected  Fevers chills no redness or worsening swelling  Prior to Admission Medications   Prescriptions Last Dose Informant Patient Reported? Taking? albuterol (PROVENTIL HFA,VENTOLIN HFA) 90 mcg/act inhaler   No No   Sig: Inhale 2 puffs every 4 (four) hours as needed for wheezing   ibuprofen (MOTRIN) 800 mg tablet   No No   Sig: Take 1 tablet (800 mg total) by mouth 3 (three) times a day   lidocaine (LIDODERM) 5 %   No No   Sig: Apply 1 patch topically daily Remove & Discard patch within 12 hours or as directed by MD   Patient not taking: Reported on 12/23/2019      Facility-Administered Medications: None       Past Medical History:   Diagnosis Date    Anemia     [facility] [address] [facility]     Seizures [facility] [address] [facility]        Past Surgical History:   Procedure Laterality Date    TUBAL LIGATION         History reviewed  No pertinent family history  I have reviewed and agree with the history as documented  Social History     Tobacco Use    Smoking status: Current Every Day Smoker     Packs/day: 0 25    Smokeless tobacco: Never Used   Substance Use Topics    Alcohol use: No    Drug use: No        Review of Systems   All other systems reviewed and are negative  Physical Exam  Physical Exam   Constitutional: She appears well-developed and well-nourished  HENT:   Head: Normocephalic and atraumatic     Right Ear: Tympanic membrane and external ear normal    Left Ear: Tympanic membrane and external ear normal    Mouth/Throat: Oropharynx is clear and moist    Eyes: Conjunctivae and EOM are normal    Neck: Neck supple  Cardiovascular: Normal rate, regular rhythm, normal heart sounds and intact distal pulses  Pulmonary/Chest: Effort normal and breath sounds normal    Abdominal: Soft  Bowel sounds are normal    Musculoskeletal: Normal range of motion  Right knee: She exhibits normal range of motion, no swelling, no effusion and no erythema  Legs:  Lymphadenopathy:     She has no cervical adenopathy  Neurological: She is alert  Skin: Skin is warm  No rash noted  Psychiatric: She has a normal mood and affect  Her behavior is normal    Nursing note and vitals reviewed  Vital Signs  ED Triage Vitals [01/06/20 1105]   Temperature Pulse Respirations Blood Pressure SpO2   99 1 °F (37 3 °C) 71 16 107/63 100 %      Temp Source Heart Rate Source Patient Position - Orthostatic VS BP Location FiO2 (%)   Oral -- -- -- --      Pain Score       5           Vitals:    01/06/20 1105   BP: 107/63   Pulse: 71         Visual Acuity      ED Medications  Medications   neomycin-bacitracin-polymyxin b (NEOSPORIN) ointment 1 small application (1 small application Topical Given 1/6/20 1145)       Diagnostic Studies  Results Reviewed     None                 No orders to display              Procedures  Procedures         ED Course                               MDM  Number of Diagnoses or Management Options  Skin cyst: established and worsening  Visit for wound check: new and does not require workup  Diagnosis management comments: No active drainage - no drainage with compression, wound dressed with nonadherant dressing with nursing - instructions reviwed w pt   Needs note for work today - also here with her 4 kids with URI symptoms       Patient Progress  Patient progress: improved        Disposition  Final diagnoses:   Skin cyst   Visit for wound check     Time reflects when diagnosis was documented in both MDM as applicable and the Disposition within this note Time User Action Codes Description Comment    1/6/2020 11:43 AM Rekha Manrique [L72 9] Skin cyst     1/6/2020 11:43 AM Rekha Manrique [Z51 89] Visit for wound check       ED Disposition     ED Disposition Condition Date/Time Comment    Discharge Stable Mon Jan 6, 2020 11:43 AM Alberta Razor discharge to home/self care  Follow-up Information    None         Patient's Medications   Discharge Prescriptions    No medications on file     No discharge procedures on file      ED Provider  Electronically Signed by           Nicole Schulz PA-C  01/06/20 9874

## 2020-01-06 NOTE — DISCHARGE INSTRUCTIONS
Rest and ice area  Ibuprofen as needed for pain  Ace wrap for support  FU with your doctor/sports med  Return to the ED for worsening symptoms  Neosporin to area  Keep FU for your MRI and FU with ortho

## 2020-01-07 ENCOUNTER — HOSPITAL ENCOUNTER (OUTPATIENT)
Dept: MRI IMAGING | Facility: HOSPITAL | Age: 40
Discharge: HOME | End: 2020-01-07
Attending: ORTHOPAEDIC SURGERY
Payer: COMMERCIAL

## 2020-01-07 DIAGNOSIS — L72.0 CYST OF SKIN AND SUBCUTANEOUS TISSUE: ICD-10-CM

## 2020-01-07 PROCEDURE — 73721 MRI JNT OF LWR EXTRE W/O DYE: CPT

## 2020-01-14 ENCOUNTER — OFFICE VISIT (OUTPATIENT)
Dept: OBGYN CLINIC | Facility: MEDICAL CENTER | Age: 40
End: 2020-01-14
Payer: COMMERCIAL

## 2020-01-14 VITALS
DIASTOLIC BLOOD PRESSURE: 65 MMHG | WEIGHT: 172 LBS | BODY MASS INDEX: 24.08 KG/M2 | HEIGHT: 71 IN | SYSTOLIC BLOOD PRESSURE: 130 MMHG

## 2020-01-14 DIAGNOSIS — M79.89 SOFT TISSUE MASS: Primary | ICD-10-CM

## 2020-01-14 PROCEDURE — 99213 OFFICE O/P EST LOW 20 MIN: CPT | Performed by: ORTHOPAEDIC SURGERY

## 2020-01-15 PROBLEM — M79.89 SOFT TISSUE MASS: Status: ACTIVE | Noted: 2020-01-15

## 2020-01-15 RX ORDER — CEFAZOLIN SODIUM 2 G/50ML
2000 SOLUTION INTRAVENOUS ONCE
Status: CANCELLED | OUTPATIENT
Start: 2020-02-03 | End: 2020-01-15

## 2020-01-15 RX ORDER — CHLORHEXIDINE GLUCONATE 4 G/100ML
SOLUTION TOPICAL DAILY PRN
Status: CANCELLED | OUTPATIENT
Start: 2020-01-15

## 2020-01-15 NOTE — PROGRESS NOTES
Orthopaedic Surgery Note    CC: Right Knee Pain      HPI:  Joelle Hunt is a 44 y  o female with a history of right knee pain following a fall as documented previously  Briefly, she had an abrasion and developed a cyst or mass over the anterior aspect of the knee  This was attempted to be excised or lanced by the ED but this was not efficacious  She was referred to me for evaluation of this mass  MRI was obtained and is consistent with possible foreign body, which would be consistent with her stated history of fall with anterior knee wound  ALLERGIES:  No Known Allergies    CURRENT MEDICATIONS:  Current Outpatient Medications   Medication Sig Dispense Refill    albuterol (PROVENTIL HFA,VENTOLIN HFA) 90 mcg/act inhaler Inhale 2 puffs every 4 (four) hours as needed for wheezing 1 Inhaler 0    ibuprofen (MOTRIN) 800 mg tablet Take 1 tablet (800 mg total) by mouth 3 (three) times a day 21 tablet 0    lidocaine (LIDODERM) 5 % Apply 1 patch topically daily Remove & Discard patch within 12 hours or as directed by MD (Patient not taking: Reported on 12/23/2019) 10 patch 0     No current facility-administered medications for this visit  PAST MEDICAL HISTORY  Past Medical History:   Diagnosis Date    Anemia     [facility] [address] [facility]     [facility]ures ([facility] [address] [facility]        SURGICAL HISTORY  Past Surgical History:   Procedure Laterality Date    TUBAL LIGATION         FAMILY HISTORY  History reviewed  No pertinent family history      SOCIAL HISTORY  Social History     Socioeconomic History    Marital status: /Civil Union     Spouse name: Not on file    Number of children: Not on file    Years of education: Not on file    Highest education level: Not on file   Occupational History    Not on file   Social Needs    Financial resource strain: Not on file    Food insecurity:     Worry: Not on file     Inability: Not on file    Transportation needs:     Medical: Not on file     Non-medical: Not on file Tobacco Use    Smoking status: Current Every Day Smoker     Packs/day: 0 25    Smokeless tobacco: Never Used   Substance and Sexual Activity    Alcohol use: No    Drug use: No    Sexual activity: Not on file   Lifestyle    Physical activity:     Days per week: Not on file     Minutes per session: Not on file    Stress: Not on file   Relationships    Social connections:     Talks on phone: Not on file     Gets together: Not on file     Attends Yazidi service: Not on file     Active member of club or organization: Not on file     Attends meetings of clubs or organizations: Not on file     Relationship status: Not on file    Intimate partner violence:     Fear of current or ex partner: Not on file     Emotionally abused: Not on file     Physically abused: Not on file     Forced sexual activity: Not on file   Other Topics Concern    Not on file   Social History Narrative    Not on file         Review of Systems   Patient indicated positive for balance problems, trouble walking  Otherwise negative except per above and HPI  Physical Exam    Vitals  Vitals:    01/14/20 1656   BP: 130/65       BMI  Body mass index is 23 99 kg/m²  GENERAL: No acute distress  Alert and oriented  Well nourished and well hydrated  Appears stated age  HEENT : Normocephalic, atraumatic  Extraocular movements intact  Mucous membranes moist  NECK: Supple, trachea midline  LUNGS: Adequate and symmetric respiratory effort  No intercostal retractions or accessory muscle use  HEART: Extremities warm and perfused  ABDOMEN: Nondistended  SKIN: Warm and dry, no rash  Right Knee   Inspection/Appearance:       Swelling: No      Patella is midline  Soft tissue mass palpable anterolateral aspect of knee  Healed incision  The mass is subcutaneous and no drainage or purulence with pressure  Alignment:  Knee is in neutral  Palpation - Soft Tissue: normal without effusion     ROM:       Extension - 0          Flexion - 130 extensor lag: no    Stability:  demonstrates no varus, valgus, anterior drawer or posterior drawer  Patella: stable, tracks normally  Motor:        5/5 quadriceps       5/5 hamstrings  Vascular: Knee is warm and sensate with normal refill  Imaging  A) Imaging modality available  Radiographs: yes  MRI scan: yes  CT scan: no    B) Imaging findings  Imaging reviewed and MRI appears consistent with foreign body anterolateral aspect of knee  Assessment and Plan  Right Knee Soft-Tissue Mass    - discussed with patient that MRI is consistent with benign mass, likely foreign body reaction  She does have pain from this and would like to have the mass removed  I think proceeding with excisional biopsy of this soft-tissue mass is reasonable given pain  Written informed consent reviewed with patient and we will proceed with excision as above  Jeffery Velez MD  Adult Reconstruction Surgery  Department [place] [address]  9:43 AM

## 2020-01-15 NOTE — H&P (VIEW-ONLY)
Orthopaedic Surgery Note    CC: Right Knee Pain      HPI:  Jose Bennett is a 44 y  o female with a history of right knee pain following a fall as documented previously  Briefly, she had an abrasion and developed a cyst or mass over the anterior aspect of the knee  This was attempted to be excised or lanced by the ED but this was not efficacious  She was referred to me for evaluation of this mass  MRI was obtained and is consistent with possible foreign body, which would be consistent with her stated history of fall with anterior knee wound  ALLERGIES:  No Known Allergies    CURRENT MEDICATIONS:  Current Outpatient Medications   Medication Sig Dispense Refill    albuterol (PROVENTIL HFA,VENTOLIN HFA) 90 mcg/act inhaler Inhale 2 puffs every 4 (four) hours as needed for wheezing 1 Inhaler 0    ibuprofen (MOTRIN) 800 mg tablet Take 1 tablet (800 mg total) by mouth 3 (three) times a day 21 tablet 0    lidocaine (LIDODERM) 5 % Apply 1 patch topically daily Remove & Discard patch within 12 hours or as directed by MD (Patient not taking: Reported on 12/23/2019) 10 patch 0     No current facility-administered medications for this visit  PAST MEDICAL HISTORY  Past Medical History:   Diagnosis Date    Anemia     [facility] [address]     Seizures ([facility] [address] [facility]        SURGICAL HISTORY  Past Surgical History:   Procedure Laterality Date    TUBAL LIGATION         FAMILY HISTORY  History reviewed  No pertinent family history      SOCIAL HISTORY  Social History     Socioeconomic History    Marital status: /Civil Union     Spouse name: Not on file    Number of children: Not on file    Years of education: Not on file    Highest education level: Not on file   Occupational History    Not on file   Social Needs    Financial resource strain: Not on file    Food insecurity:     Worry: Not on file     Inability: Not on file    Transportation needs:     Medical: Not on file     Non-medical: Not on file Tobacco Use    Smoking status: Current Every Day Smoker     Packs/day: 0 25    Smokeless tobacco: Never Used   Substance and Sexual Activity    Alcohol use: No    Drug use: No    Sexual activity: Not on file   Lifestyle    Physical activity:     Days per week: Not on file     Minutes per session: Not on file    Stress: Not on file   Relationships    Social connections:     Talks on phone: Not on file     Gets together: Not on file     Attends Tenriism service: Not on file     Active member of club or organization: Not on file     Attends meetings of clubs or organizations: Not on file     Relationship status: Not on file    Intimate partner violence:     Fear of current or ex partner: Not on file     Emotionally abused: Not on file     Physically abused: Not on file     Forced sexual activity: Not on file   Other Topics Concern    Not on file   Social History Narrative    Not on file         Review of Systems   Patient indicated positive for balance problems, trouble walking  Otherwise negative except per above and HPI  Physical Exam    Vitals  Vitals:    01/14/20 1656   BP: 130/65       BMI  Body mass index is 23 99 kg/m²  GENERAL: No acute distress  Alert and oriented  Well nourished and well hydrated  Appears stated age  HEENT : Normocephalic, atraumatic  Extraocular movements intact  Mucous membranes moist  NECK: Supple, trachea midline  LUNGS: Adequate and symmetric respiratory effort  No intercostal retractions or accessory muscle use  HEART: Extremities warm and perfused  ABDOMEN: Nondistended  SKIN: Warm and dry, no rash  Right Knee   Inspection/Appearance:       Swelling: No      Patella is midline  Soft tissue mass palpable anterolateral aspect of knee  Healed incision  The mass is subcutaneous and no drainage or purulence with pressure  Alignment:  Knee is in neutral  Palpation - Soft Tissue: normal without effusion     ROM:       Extension - 0          Flexion - 130 extensor lag: no    Stability:  demonstrates no varus, valgus, anterior drawer or posterior drawer  Patella: stable, tracks normally  Motor:        5/5 quadriceps       5/5 hamstrings  Vascular: Knee is warm and sensate with normal refill  Imaging  A) Imaging modality available  Radiographs: yes  MRI scan: yes  CT scan: no    B) Imaging findings  Imaging reviewed and MRI appears consistent with foreign body anterolateral aspect of knee  Assessment and Plan  Right Knee Soft-Tissue Mass    - discussed with patient that MRI is consistent with benign mass, likely foreign body reaction  She does have pain from this and would like to have the mass removed  I think proceeding with excisional biopsy of this soft-tissue mass is reasonable given pain  Written informed consent reviewed with patient and we will proceed with excision as above  Cas Odonnell MD  Adult Reconstruction Surgery  Department [place] [address]  9:43 AM

## 2020-01-31 ENCOUNTER — ANESTHESIA EVENT (OUTPATIENT)
Dept: PERIOP | Facility: HOSPITAL | Age: 40
End: 2020-01-31
Payer: COMMERCIAL

## 2020-01-31 NOTE — ANESTHESIA PREPROCEDURE EVALUATION
Review of Systems/Medical History  Patient summary reviewed  Chart reviewed  No history of anesthetic complications     Cardiovascular  Negative cardio ROS Exercise tolerance (METS): <4,     Pulmonary  Smoker cigarette smoker  , Tobacco cessation counseling given Cumulative Pack Years: 10, Pneumonia (resolved), No sleep apnea ,        GI/Hepatic  Negative GI/hepatic ROS          Negative  ROS        Endo/Other     GYN  Not currently pregnant , Prior pregnancy/OB history : 7+ Parity: 7+,          Hematology  Anemia iron deficiency anemia,     Musculoskeletal       Neurology  Seizures well controlled,  Headaches,    Psychology           Physical Exam    Airway    Mallampati score: II  TM Distance: >3 FB       Dental       Cardiovascular  Comment: Negative ROS, Cardiovascular exam normal    Pulmonary  Pulmonary exam normal     Other Findings  Fixed upper and lower teeth and in good repair      Anesthesia Plan  ASA Score- 2     Anesthesia Type- IV sedation with anesthesia with ASA Monitors  Additional Monitors:   Airway Plan:         Plan Factors-Patient not instructed to abstain from smoking on day of procedure  Patient did not smoke on day of surgery  Induction- intravenous  Postoperative Plan- Plan for postoperative opioid use  Informed Consent- Anesthetic plan and risks discussed with patient and son  I personally reviewed this patient with the CRNA  Discussed and agreed on the Anesthesia Plan with the CRNA  Rubens Seay

## 2020-01-31 NOTE — PRE-PROCEDURE INSTRUCTIONS
Pre-Surgery Instructions:   Medication Instructions    albuterol (PROVENTIL HFA,VENTOLIN HFA) 90 mcg/act inhaler Instructed patient per Anesthesia Guidelines  Pre-op Showering Instructions for Surgery Patients    Before your operation, you play an important role in decreasing your risk for infection by washing with special antiseptic soap  This is an effective way to reduce bacteria on the skin which may help to prevent infections at the surgical site  Please read the following directions in advance  1  In the week before your operation, purchase a 4 ounce bottle of antiseptic soap containing chlorhexidine gluconate (CHG)  4%  Some brand names include: Aplicare®, Endure, and Hibiclens®  The cost is usually less than $5 00   For your convenience, the [facility] carries the soap   It may also be available at your doctors office or pre-admission testing center, and at most retail pharmacies   If you are allergic or sensitive to soaps containing CHG, please let your doctor know so another antiseptic can be suggested   CHG antiseptic soap is for external use only  2  The day before your operation, follow these instructions carefully to get ready   Please clean linens (sheets) on your bed; you should sleep on clean sheets after your evening shower   Get clean towels and washcloth ready - you need enough for 2 showers   Set aside clean underwear, pajamas, and clothing to wear after the showers     Reminders:   DO NOT use any other soap or body rinse on your skin during or after the antiseptic showers   DO NOT use lotion, powder, deodorant, or perfume/aftershave of any kind on your skin after your antiseptic shower   DO NOT shave any body parts in the 24 hours/day before your operation   DO NOT get the antiseptic soap in your eyes, ears, nose, mouth, or vaginal area    3   You will need to shower the night before AND the morning of your surgery  Shower 1:   The first evening before the operation, take the first shower   First, shampoo your hair with regular shampoo and rinse it completely before you use the antiseptic soap  After washing and rinsing your hair, rinse your body   Next, use a clean washcloth to apply the antiseptic soap and wash your body from the neck down to your toes using ½ bottle of the antiseptic soap   You will use the other ½ bottle for the second shower   Clean the area where your incision will be; lather this area well for about 2 minutes   If you are having head or neck surgery, wash areas with the antiseptic soap   Rinse yourself completely with running water   Use a clean towel to dry off   Wear clean underwear and clothing/pajamas  Shower 2   The morning of your operation, take the second shower following the same steps as Shower 1 using the second ½ of the bottle of antiseptic soap   Use clean cloths and towels to wash and dry yourself   Wear clean underwear and clothing

## 2020-02-03 ENCOUNTER — HOSPITAL ENCOUNTER (OUTPATIENT)
Facility: HOSPITAL | Age: 40
Setting detail: OUTPATIENT SURGERY
Discharge: HOME | End: 2020-02-03
Attending: ORTHOPAEDIC SURGERY | Admitting: ORTHOPAEDIC SURGERY
Payer: COMMERCIAL

## 2020-02-03 ENCOUNTER — ANESTHESIA (OUTPATIENT)
Dept: PERIOP | Facility: HOSPITAL | Age: 40
End: 2020-02-03
Payer: COMMERCIAL

## 2020-02-03 VITALS
TEMPERATURE: 97.9 F | HEART RATE: 61 BPM | RESPIRATION RATE: 16 BRPM | OXYGEN SATURATION: 100 % | SYSTOLIC BLOOD PRESSURE: 105 MMHG | DIASTOLIC BLOOD PRESSURE: 63 MMHG

## 2020-02-03 DIAGNOSIS — M79.89 SOFT TISSUE MASS: ICD-10-CM

## 2020-02-03 LAB
EXT PREGNANCY TEST URINE: NEGATIVE
EXT. CONTROL: NORMAL

## 2020-02-03 PROCEDURE — 27347 REMOVE KNEE CYST: CPT | Performed by: ORTHOPAEDIC SURGERY

## 2020-02-03 PROCEDURE — 88307 TISSUE EXAM BY PATHOLOGIST: CPT | Performed by: PATHOLOGY

## 2020-02-03 PROCEDURE — 81025 URINE PREGNANCY TEST: CPT | Performed by: ANESTHESIOLOGY

## 2020-02-03 PROCEDURE — 27347 REMOVE KNEE CYST: CPT | Performed by: PHYSICIAN ASSISTANT

## 2020-02-03 RX ORDER — SODIUM CHLORIDE, SODIUM LACTATE, POTASSIUM CHLORIDE, CALCIUM CHLORIDE 600; 310; 30; 20 MG/100ML; MG/100ML; MG/100ML; MG/100ML
75 INJECTION, SOLUTION INTRAVENOUS CONTINUOUS
Status: DISCONTINUED | OUTPATIENT
Start: 2020-02-03 | End: 2020-02-03 | Stop reason: HOSPADM

## 2020-02-03 RX ORDER — DIPHENHYDRAMINE HYDROCHLORIDE 50 MG/ML
12.5 INJECTION INTRAMUSCULAR; INTRAVENOUS ONCE AS NEEDED
Status: DISCONTINUED | OUTPATIENT
Start: 2020-02-03 | End: 2020-02-03 | Stop reason: HOSPADM

## 2020-02-03 RX ORDER — CEFAZOLIN SODIUM 2 G/50ML
SOLUTION INTRAVENOUS AS NEEDED
Status: DISCONTINUED | OUTPATIENT
Start: 2020-02-03 | End: 2020-02-03 | Stop reason: SURG

## 2020-02-03 RX ORDER — FENTANYL CITRATE/PF 50 MCG/ML
25 SYRINGE (ML) INJECTION
Status: DISCONTINUED | OUTPATIENT
Start: 2020-02-03 | End: 2020-02-03 | Stop reason: HOSPADM

## 2020-02-03 RX ORDER — LIDOCAINE HYDROCHLORIDE 10 MG/ML
INJECTION, SOLUTION EPIDURAL; INFILTRATION; INTRACAUDAL; PERINEURAL AS NEEDED
Status: DISCONTINUED | OUTPATIENT
Start: 2020-02-03 | End: 2020-02-03 | Stop reason: SURG

## 2020-02-03 RX ORDER — ACETAMINOPHEN 325 MG/1
975 TABLET ORAL EVERY 8 HOURS
Status: DISCONTINUED | OUTPATIENT
Start: 2020-02-03 | End: 2020-02-03 | Stop reason: HOSPADM

## 2020-02-03 RX ORDER — FENTANYL CITRATE 50 UG/ML
INJECTION, SOLUTION INTRAMUSCULAR; INTRAVENOUS AS NEEDED
Status: DISCONTINUED | OUTPATIENT
Start: 2020-02-03 | End: 2020-02-03 | Stop reason: SURG

## 2020-02-03 RX ORDER — CEFAZOLIN SODIUM 2 G/50ML
2000 SOLUTION INTRAVENOUS ONCE
Status: DISCONTINUED | OUTPATIENT
Start: 2020-02-03 | End: 2020-02-03 | Stop reason: HOSPADM

## 2020-02-03 RX ORDER — MAGNESIUM HYDROXIDE 1200 MG/15ML
LIQUID ORAL AS NEEDED
Status: DISCONTINUED | OUTPATIENT
Start: 2020-02-03 | End: 2020-02-03 | Stop reason: HOSPADM

## 2020-02-03 RX ORDER — MIDAZOLAM HYDROCHLORIDE 2 MG/2ML
INJECTION, SOLUTION INTRAMUSCULAR; INTRAVENOUS AS NEEDED
Status: DISCONTINUED | OUTPATIENT
Start: 2020-02-03 | End: 2020-02-03 | Stop reason: SURG

## 2020-02-03 RX ORDER — PROPOFOL 10 MG/ML
INJECTION, EMULSION INTRAVENOUS AS NEEDED
Status: DISCONTINUED | OUTPATIENT
Start: 2020-02-03 | End: 2020-02-03 | Stop reason: SURG

## 2020-02-03 RX ORDER — PROMETHAZINE HYDROCHLORIDE 25 MG/ML
12.5 INJECTION, SOLUTION INTRAMUSCULAR; INTRAVENOUS ONCE AS NEEDED
Status: DISCONTINUED | OUTPATIENT
Start: 2020-02-03 | End: 2020-02-03 | Stop reason: HOSPADM

## 2020-02-03 RX ORDER — MEPERIDINE HYDROCHLORIDE 25 MG/ML
12.5 INJECTION INTRAMUSCULAR; INTRAVENOUS; SUBCUTANEOUS
Status: DISCONTINUED | OUTPATIENT
Start: 2020-02-03 | End: 2020-02-03 | Stop reason: HOSPADM

## 2020-02-03 RX ORDER — CHLORHEXIDINE GLUCONATE 4 G/100ML
SOLUTION TOPICAL DAILY PRN
Status: DISCONTINUED | OUTPATIENT
Start: 2020-02-03 | End: 2020-02-03 | Stop reason: HOSPADM

## 2020-02-03 RX ORDER — FENTANYL CITRATE/PF 50 MCG/ML
12.5 SYRINGE (ML) INJECTION
Status: DISCONTINUED | OUTPATIENT
Start: 2020-02-03 | End: 2020-02-03 | Stop reason: HOSPADM

## 2020-02-03 RX ORDER — PROPOFOL 10 MG/ML
INJECTION, EMULSION INTRAVENOUS CONTINUOUS PRN
Status: DISCONTINUED | OUTPATIENT
Start: 2020-02-03 | End: 2020-02-03 | Stop reason: SURG

## 2020-02-03 RX ORDER — DEXAMETHASONE SODIUM PHOSPHATE 4 MG/ML
4 INJECTION, SOLUTION INTRA-ARTICULAR; INTRALESIONAL; INTRAMUSCULAR; INTRAVENOUS; SOFT TISSUE ONCE AS NEEDED
Status: DISCONTINUED | OUTPATIENT
Start: 2020-02-03 | End: 2020-02-03 | Stop reason: HOSPADM

## 2020-02-03 RX ORDER — BUPIVACAINE HYDROCHLORIDE AND EPINEPHRINE 5; 5 MG/ML; UG/ML
INJECTION, SOLUTION PERINEURAL AS NEEDED
Status: DISCONTINUED | OUTPATIENT
Start: 2020-02-03 | End: 2020-02-03 | Stop reason: HOSPADM

## 2020-02-03 RX ADMIN — PROPOFOL 50 MG: 10 INJECTION, EMULSION INTRAVENOUS at 14:38

## 2020-02-03 RX ADMIN — CEFAZOLIN SODIUM 2000 MG: 2 SOLUTION INTRAVENOUS at 14:35

## 2020-02-03 RX ADMIN — MIDAZOLAM HYDROCHLORIDE 2 MG: 1 INJECTION, SOLUTION INTRAMUSCULAR; INTRAVENOUS at 14:38

## 2020-02-03 RX ADMIN — FENTANYL CITRATE 25 MCG: 50 INJECTION, SOLUTION INTRAMUSCULAR; INTRAVENOUS at 15:30

## 2020-02-03 RX ADMIN — LIDOCAINE HYDROCHLORIDE 50 MG: 10 INJECTION, SOLUTION EPIDURAL; INFILTRATION; INTRACAUDAL; PERINEURAL at 14:38

## 2020-02-03 RX ADMIN — PROPOFOL 100 MCG/KG/MIN: 10 INJECTION, EMULSION INTRAVENOUS at 14:38

## 2020-02-03 RX ADMIN — FENTANYL CITRATE 50 MCG: 50 INJECTION INTRAMUSCULAR; INTRAVENOUS at 14:38

## 2020-02-03 RX ADMIN — SODIUM CHLORIDE, SODIUM LACTATE, POTASSIUM CHLORIDE, AND CALCIUM CHLORIDE: .6; .31; .03; .02 INJECTION, SOLUTION INTRAVENOUS at 14:05

## 2020-02-03 RX ADMIN — FENTANYL CITRATE 25 MCG: 50 INJECTION, SOLUTION INTRAMUSCULAR; INTRAVENOUS at 15:25

## 2020-02-03 RX ADMIN — FENTANYL CITRATE 50 MCG: 50 INJECTION INTRAMUSCULAR; INTRAVENOUS at 14:43

## 2020-02-03 NOTE — ANESTHESIA POSTPROCEDURE EVALUATION
Post-Op Assessment Note    CV Status:  Stable  Pain Score: 0    Pain management: adequate     Mental Status:  Alert   Hydration Status:  Stable   PONV Controlled:  Controlled   Airway Patency:  Patent   Post Op Vitals Reviewed: Yes      Staff: CRNA           BP   120/73   Temp     Pulse 67   Resp 20   SpO2 100

## 2020-02-03 NOTE — OP NOTE
OPERATIVE NOTE  PATIENT NAME: Carlitos Gonzalez  MRN: [id number]  DATE OF SURGERY: 02/03/20    SURGEON: RASHAD Luna     ASSISTANT: Teddy Roland PA-C    An assistant was necessary for the surgery given the complexity of the operation, and the need for a skilled surgical assistant for proper retraction of critical soft tissues including ligaments, tendons, and neurovascular structures, as well as adjacent bony structures  Precise retractor placement and maintenance of precise retraction is critical, as is skilled positioning of the limb during the different parts of the procedure  No qualified resident was available  PREOPERATIVE DIAGNOSIS:   Soft-tissue mass right knee    POSTOPERATIVE DIAGNOSIS:   Same  PROCEDURE:   Excisional biopsy soft-tissue mass of right knee    STAFF:   Circulator: Zebedee Meckel, RN  Relief Scrub: Kylee Lopez RN  Scrub Person: Alejandro Section    ANESTHESIA: IV Sedation with Anesthesia    ESTIMATED BLOOD LOSS: Minimal      COMPLICATIONS: None  INDICATIONS FOR PROCEDURE:   Ms Gino Bryant is a 44y o -year-old female who sustained a fall after slipping in snow and ice in early December 2019 and she developed a wound on the anterolateral aspect of the right knee  She initially had a procedure in the emergency department but the soft-tissue mass eventually returned  She came to the office for evaluation and an MRI was obtained that demonstrate the soft-tissue mass, with possible foreign body  Given the recurrent nature and pain from this mass she was indicated for excisional biopsy of the mass and came to the operating room today for this procedure  The patient did consent to the procedure after discussion of the risks and benefits  DESCRIPTION OF PROCEDURE: The patient was brought to the operating room and placed supine on the operating table  The patient had been signed prior to the procedure and this was documented   The patient had anesthesia induced placed by the anesthesiologist   The prep verification and incision time-outs were performed to confirm that this was the correct patient, site, side, and location  The patient had SCDs in place  The patient did receive antibiotics prior to the incision  The patient had the extremity prepped and draped in the standard surgical fashion  The bony landmarks were palpated and the incision was drawn with a marker  The mass was palpated and a longitudinal incision was made overlying the soft tissue mass on the anterolateral aspect of the knee  Viscous, clear fluid was returned just after the subcutaneous dissection was undertaken  At this point in time we place skin hooks and dissected on the medial and lateral aspect of the mass, which appeared to by cystic  There was a stalk that communicated with the joint  The stalk was truncated and the mass was sent for permanent pathology  The wound was irrigated at this time  We then carefully used 2-0 vicryl suture to close the stalk and any communication with the knee joint  We then again irrigated the wound and proceeded to close the incision using 2-0 vicryl for subcutaneous tissues and 3-0 running monocryl for skin  We then placed steristrips and dermabond  Telfa, tegaderm were placed and then gently overwrapped with ACE wrap  The patient was then transferred back to the bed and left the operating room in stable condition  All sponge and instrument counts were correct  POSTOPERATIVE PLAN:   Ms Francois Catherine will be discharged home  She will be WBAT and ROM as tolerated  She may remove the ACE wrap and telfa, tegaderm tomorrow  The steristrips should remain in place  She may allow water to run over the incision but should not scrub the incision or submerge it in water  Yanique Vilchis MD  Department of [facility] 6  3:15 PM

## 2020-02-03 NOTE — DISCHARGE INSTRUCTIONS
Discharge Instructions - Orthopedics  Heath Niall 44 y o  female MRN: [id number]  Unit/Bed#: OR MAIN    Weight Bearing Status:                                           Weight bearing as tolerated to the right leg    Pain:  Continue analgesics as directed    Dressing Instructions:   Keep dressing clean, dry and intact until post-op day 3  You may shower allow the water to run over the incision, do not soak or scrub  If the dressing becomes wet, replace it with gauze and tape  Appt Instructions: If you do not have your appointment, please call the clinic at [phone] to f/u with Dr Ish Bernabe in 2 weeks  Otherwise followup as scheduled     Contact the office sooner if you experience any increased numbness/tingling in the extremities

## 2020-02-03 NOTE — NURSING NOTE
Ace Wrap r-knee dry/intact  Denies pain at this time  Fitted for single-point cane; per order  Ambulated to bathroom  No complaints   VSS

## 2020-02-03 NOTE — INTERVAL H&P NOTE
H&P reviewed  After examining the patient I find no changes in the patients condition since the H&P had been written  Confirmed right knee soft-tissue mass excisional biopsy with patient  Initials placed on surgical site        Vitals:    02/03/20 1045   BP: 107/75   Pulse: 72   Resp: 18   Temp: (!) 97 3 °F (36 3 °C)   SpO2: 98%

## 2020-02-04 ENCOUNTER — TELEPHONE (OUTPATIENT)
Dept: OBGYN CLINIC | Facility: HOSPITAL | Age: 40
End: 2020-02-04

## 2020-02-04 DIAGNOSIS — M79.89 SOFT TISSUE MASS: Primary | ICD-10-CM

## 2020-02-04 RX ORDER — TRAMADOL HYDROCHLORIDE 50 MG/1
50 TABLET ORAL EVERY 6 HOURS PRN
Qty: 20 TABLET | Refills: 0 | Status: SHIPPED | OUTPATIENT
Start: 2020-02-04 | End: 2020-05-20 | Stop reason: ALTCHOICE

## 2020-02-04 NOTE — TELEPHONE ENCOUNTER
Patient sees Dr Phoebe Zuniga  Patient is having severe pain  She stated that she cannot oven walk to the bathroom that she is walking with her cane  She stated that she was not even given pain medication  Patient is wanting to know if this is normal or not  She is asking if someone can contact her back relating this     Call back# [phone]

## 2020-02-04 NOTE — TELEPHONE ENCOUNTER
Spoke to patient  She has been advised  She stated she has a hard time getting up and down stairs and that her knee feels "heavy"  Advised her that if she has not been icing, elevating, and taking an antiinflammatory this could certainly be due to swelling  Advised her to ice, take motrin and elevate as advised, getting up at least hourly to prevent blood clots, and she should see improvement later today in the pain and swelling overall  Patient verbalized understanding  Encouraged calling us back if she has any further questions or concerns

## 2020-02-04 NOTE — TELEPHONE ENCOUNTER
Please advise patient that narcotics are typically not prescribed for soft-tissue only procedures  I will place a small script for tramadol but this is for breakthrough pain  She should use tylenol, ibuprofen, ice      MLB

## 2020-02-04 NOTE — TELEPHONE ENCOUNTER
Spoke to patient  She stated she can barely walk without pain  Stated she is using the cane all the time to get around  Requesting pain medication  Patient stated she just got motrin to take  Advised her to alternate motrin 400 mg Q6h and tylenol 1000 mg Q8h for pain as needed  Also advised icing 20 mins on 20 mins off and rest with it elevated to help with the pain  Patient verbalized understanding  Patient still requesting something for pain  Please advise

## 2020-02-11 ENCOUNTER — HOSPITAL ENCOUNTER (EMERGENCY)
Facility: HOSPITAL | Age: 40
Discharge: HOME | End: 2020-02-11
Attending: EMERGENCY MEDICINE
Payer: COMMERCIAL

## 2020-02-11 VITALS
SYSTOLIC BLOOD PRESSURE: 134 MMHG | DIASTOLIC BLOOD PRESSURE: 64 MMHG | HEART RATE: 75 BPM | OXYGEN SATURATION: 98 % | RESPIRATION RATE: 16 BRPM | TEMPERATURE: 95.5 F

## 2020-02-11 DIAGNOSIS — M25.569 KNEE PAIN: Primary | ICD-10-CM

## 2020-02-11 PROCEDURE — 99283 EMERGENCY DEPT VISIT LOW MDM: CPT

## 2020-02-11 PROCEDURE — 99281 EMR DPT VST MAYX REQ PHY/QHP: CPT | Performed by: EMERGENCY MEDICINE

## 2020-02-11 NOTE — ED PROVIDER NOTES
History  Chief Complaint   Patient presents with    Post-Op Infection     2/3 has right knee surgery and last Wednesday removed bandage and doesn't think it looks right  Has not followed up with surgeon-appointment 318     40-year-old female presents with complaint of right knee pain  She reports that on Monday she had surgery on her knee to remove a cyst   She continues to have some swelling discomfort in the area and was concerned about the possibility of infection  Denies any redness drainage from the area and she continues to clean it numerous times a day with peroxide  She is wanting to take ibuprofen instead of tramadol  She has follow-up scheduled next week and denies any other acute issues or concerns  Knee Pain   Location:  Knee  Knee location:  R knee  Relieved by:  Nothing  Worsened by:  Nothing  Associated symptoms: swelling    Associated symptoms: no back pain, no decreased ROM, no fatigue, no fever, no itching, no muscle weakness, no neck pain, no numbness, no stiffness and no tingling        Prior to Admission Medications   Prescriptions Last Dose Informant Patient Reported? Taking?    albuterol (PROVENTIL HFA,VENTOLIN HFA) 90 mcg/act inhaler   No No   Sig: Inhale 2 puffs every 4 (four) hours as needed for wheezing   ibuprofen (MOTRIN) 800 mg tablet   No No   Sig: Take 1 tablet (800 mg total) by mouth 3 (three) times a day   traMADol (ULTRAM) 50 mg tablet   No No   Sig: Take 1 tablet (50 mg total) by mouth every 6 (six) hours as needed for severe pain      Facility-Administered Medications: None       Past Medical History:   Diagnosis Date    Anemia     iron def    Chronic pain disorder     back    Migraines     Pneumonia 2018    Seasonal allergies     Seizure ([facility] [address] [facility]     Seizures ([facility] [address] )     38 years ago       Past Surgical History:   Procedure Laterality Date    MA EXC TUMOR SOFT TISSUE THIGH/KNEE SUBFASC 5+CM Right 2/3/2020    Procedure: EXCISIONAL BIOPSY SOFT  TISSUE MASS RIGHT KNEE; Surgeon: Helena Sandhu MD;  [facility]: [facility] MAIN OR;  Service: Orthopedics    TUBAL LIGATION         Family History   Problem Relation Age of Onset    Heart disease Mother     Hypertension Mother      I have reviewed and agree with the history as documented  Social History     Tobacco Use    Smoking status: Current Every Day Smoker     Packs/day: 0 25    Smokeless tobacco: Never Used   Substance Use Topics    Alcohol use: No    Drug use: No        Review of Systems   Constitutional: Negative for fatigue and fever  Musculoskeletal: Negative for back pain, neck pain and stiffness  Skin: Positive for wound (Surgical wound-healing)  Negative for color change, itching, pallor and rash  Neurological: Negative for weakness and numbness  Hematological: Does not bruise/bleed easily  Physical Exam  Physical Exam   Constitutional: She appears well-developed and well-nourished  HENT:   Head: Normocephalic and atraumatic  Pulmonary/Chest: Effort normal  No respiratory distress  Neurological: She is alert  Skin: Skin is warm and dry  Psychiatric: She has a normal mood and affect  Nursing note and vitals reviewed        Vital Signs  ED Triage Vitals [02/11/20 0906]   Temperature Pulse Respirations Blood Pressure SpO2   (!) 95 5 °F (35 3 °C) 75 16 134/64 98 %      Temp Source Heart Rate Source Patient Position - Orthostatic VS BP Location FiO2 (%)   Tympanic Monitor Sitting Left arm --      Pain Score       5           Vitals:    02/11/20 0906   BP: 134/64   Pulse: 75   Patient Position - Orthostatic VS: Sitting         Visual Acuity      ED Medications  Medications - No data to display    Diagnostic Studies  Results Reviewed     None                 No orders to display              Procedures  Procedures         ED Course                               MDM      Disposition  Final diagnoses:   Knee pain     Time reflects when diagnosis was documented in both MDM as applicable and the Disposition within this note     Time User Action Codes Description Comment    2/11/2020  9:17 AM Glena Primus Add [T60 386] Knee pain       ED Disposition     ED Disposition Condition Date/Time Comment    Discharge Stable Tue Feb 11, 2020  9:17 AM Alberta Portillo discharge to home/self care  Follow-up Information     Follow up With Specialties Details Why Contact Info        Keep your follow-up appointment with your surgeon  Patient's Medications   Discharge Prescriptions    No medications on file     No discharge procedures on file      ED Provider  Electronically Signed by           Trung Holbrook DO  02/11/20 9651

## 2020-02-18 ENCOUNTER — OFFICE VISIT (OUTPATIENT)
Dept: OBGYN CLINIC | Facility: MEDICAL CENTER | Age: 40
End: 2020-02-18

## 2020-02-18 VITALS
HEIGHT: 71 IN | DIASTOLIC BLOOD PRESSURE: 65 MMHG | SYSTOLIC BLOOD PRESSURE: 134 MMHG | WEIGHT: 165 LBS | BODY MASS INDEX: 23.1 KG/M2

## 2020-02-18 DIAGNOSIS — M79.89 SOFT TISSUE MASS: Primary | ICD-10-CM

## 2020-02-18 PROCEDURE — 3008F BODY MASS INDEX DOCD: CPT | Performed by: ORTHOPAEDIC SURGERY

## 2020-02-18 PROCEDURE — 99024 POSTOP FOLLOW-UP VISIT: CPT | Performed by: ORTHOPAEDIC SURGERY

## 2020-02-18 NOTE — LETTER
February 18, 2020     Patient: Payam Larson   YOB: 1980   Date of Visit: 2/18/2020       To Whom it May Concern:    Payam Larson is under my professional care  She was seen in my office on 2/18/2020  She may return to work with limitations no walking greater 30 minutes without rest, no lifting >20 pounds  If you have any questions or concerns, please don't hesitate to call           Sincerely,          Sheri Benavides MD        CC: No Recipients

## 2020-02-18 NOTE — PROGRESS NOTES
PROGRESS NOTE:  2 weeks Post-operative Visit KNEE Soft tissue biopsy    Date of Surgery: 2/3/20      SUBJECTIVE:  Gary Garcia is now 2 weeks out from biopsy of right knee  No pain  She would like to get back to work on light duty  OBJECTIVE:  Knee wound is appropriate  Neurovascular status is normal    Extension: 0  Flexion: 120      POST-OP COMPLICATIONS SINCE SURGERY:  none  RADIOGRAPHS:  none    ASSESSMENT:  PLAN:  - discussed that cyst may recur  - discussed pathology, which ruled out cancer or infection  - will have her return in 4 weeks time  - keep incision clean and dry   - work note for light duty provided        [address] [place] [address]   Lori Kline MD  Adult Reconstruction  Department of [place] [address]  4:11 PM

## 2020-03-02 ENCOUNTER — EVALUATION (OUTPATIENT)
Dept: PHYSICAL THERAPY | Facility: CLINIC | Age: 40
End: 2020-03-02
Payer: COMMERCIAL

## 2020-03-02 DIAGNOSIS — M79.89 SOFT TISSUE MASS: ICD-10-CM

## 2020-03-02 PROCEDURE — 97161 PT EVAL LOW COMPLEX 20 MIN: CPT | Performed by: PHYSICAL THERAPIST

## 2020-03-02 NOTE — PROGRESS NOTES
PT Evaluation     Today's date: 3/2/2020  Patient name: Burton Damon  : 1980  MRN: [id number]  Referring provider: Pepe Rodriguez MD  Dx:   Encounter Diagnosis     ICD-10-CM    1  Soft tissue mass M79 89 Ambulatory referral to Physical Therapy                  Assessment  Assessment details: Burton Damon is a 44 y o  female who presents to physical therapy s/p mass removal from R knee  Richad Ax presents with pain, abnormal gait, and limitations in range of motion, strength, joint mobility, flexibility and functional ability  She presents with hypersensitivity along medal, lateral, and superior aspect of incision, advised patient to use tissue,towel, and different textures to desensitize the area  The current limitations are affecting Kira's ability to function at prior level  She will benefit from skilled physical therapy to address the current impairments and functional limitations to enable her to return to daily activities at premorbid status        Impairments: abnormal gait, activity intolerance, impaired physical strength and lacks appropriate home exercise program    Symptom irritability: lowUnderstanding of Dx/Px/POC: good   Prognosis: good    Goals  STG  Patient will decrease pain at worst to 7/10  Patient will demonstrate pain free knee flexion to 120  Patient will be independent with HEP    LTG   Patient will decrease pain at worst to 2/10  Patient will have no pain negotiating steps  Patient will report no limitations with work tasks  Patient will improve standing/walking tolerance to 1 hour      Plan  Patient would benefit from: skilled physical therapy  Planned modality interventions: cryotherapy and thermotherapy: hydrocollator packs  Planned therapy interventions: manual therapy, neuromuscular re-education, patient education, therapeutic activities, therapeutic exercise, therapeutic training and home exercise program  Frequency: 2x week  Duration in weeks: 6  Treatment plan discussed with: patient        Subjective Evaluation    History of Present Illness  Mechanism of injury: Carolyn Anderson is a 44 y o  female presenting to physical therapy on 20 with primary complaints of right knee pain  She reports she had a mass removed 2/3/20  Since surgery she has been on light duty at work, she works at [employer] in [employer]  She cleans the rooms  She reports if your kneeling or pressure on it it hurts  If she walks longer than 20/25 minutes she starts to notice increased pain  She reports she recently got her own place and has a lot of steps to negotiate, she reports bending causes her pain  She mentions around the incision and the top of knee is very tender  She mentions going up/down steps is the worst she has to pause before going up another flight  She reports she wraps her knee at work with an ace bandage because it feels better and when she gets home she takes it off and it hurts  She mentions she has been icing it  No difficulties with ADLs except trying to put shoes on  She denies any numbness/tingling into the leg  Pain  Current pain ratin  At best pain ratin  At worst pain rating: 10  Quality: throbbing and sharp  Relieving factors: rest, relaxation and ice  Aggravating factors: walking, standing, stair climbing and sitting    Social Support  Steps to enter house: yes  Stairs in house: yes   Lives with: adult children and young children    Employment status: working  Exercise history: going for walks    Patient Goals  Patient goals for therapy: decreased pain, increased strength and return to sport/leisure activities  Patient goal: work pain free  Objective     Observations     Additional Observation Details  Incision is clean dry intact, no signs of infection      Palpation     Additional Palpation Details  No TTP along any musculature     Tenderness     Right Knee   Tenderness in the patellar tendon and quadriceps tendon  Additional Tenderness Details  Patient presents with hypersensitivity medial, lateral, and superior to incision  (mostly superior)     Neurological Testing     Sensation     Knee   Left Knee   Intact: light touch    Right Knee   Intact: light touch     Active Range of Motion     Right Knee   Flexion: 115 degrees with pain  Extension: [facility]    Additional Active Range of Motion Details  Able to reach 115 flexion on R but starts to have pain around 90 degrees  Mobility     Additional Mobility Details  Unable to get true assessment of patellar mobility secondary to hypersensitivity     Strength/Myotome Testing     Left Hip   Planes of Motion   Flexion: 5  Extension: 4+  Abduction: 4    Right Hip   Planes of Motion   Flexion: 4+  Extension: 3+  Abduction: 3+    Left Knee   Flexion: 4  Extension: 5  Quadriceps contraction: good    Right Knee   Flexion: 3+  Extension: 3+  Quadriceps contraction: fair    Left Ankle/Foot   Dorsiflexion: 5    Right Ankle/Foot   Dorsiflexion: 5    General Comments:      Knee Comments  Gait: slight decreased stance time on RLE  Balance:  SLS L: good  SLS R: fair               Precautions: anemia, chronic pain disorder, migraines, Hx of seizures as a child        Manual  3/2            Desensitization of incision site             PROM R knee                                                        Exercise Diary  3/2            bike             Quad set 5"x10            SLR 3 way x10 ea            clayo             LAQ             Gastroc Stretch             Hamstring Stretch             Mini Squat             Step Ups                                                                                                                                                                Modalities  3/2            CP prn

## 2020-03-09 ENCOUNTER — APPOINTMENT (OUTPATIENT)
Dept: PHYSICAL THERAPY | Facility: CLINIC | Age: 40
End: 2020-03-09
Payer: COMMERCIAL

## 2020-03-11 ENCOUNTER — OFFICE VISIT (OUTPATIENT)
Dept: PHYSICAL THERAPY | Facility: CLINIC | Age: 40
End: 2020-03-11
Payer: COMMERCIAL

## 2020-03-11 DIAGNOSIS — M79.89 SOFT TISSUE MASS: Primary | ICD-10-CM

## 2020-03-11 PROCEDURE — 97140 MANUAL THERAPY 1/> REGIONS: CPT | Performed by: PHYSICAL THERAPIST

## 2020-03-11 PROCEDURE — 97110 THERAPEUTIC EXERCISES: CPT | Performed by: PHYSICAL THERAPIST

## 2020-03-11 NOTE — PROGRESS NOTES
Daily Note     Today's date: 3/11/2020  Patient name: Marie Prabhakar  : 1980  MRN: [id number]  Referring provider: Tanesha Cerda MD  Dx:   Encounter Diagnosis     ICD-10-CM    1  Soft tissue mass M79 89        Start Time: 1630  Stop Time: 1705  Total time in clinic (min): 35 minutes    Subjective: Patient reports no adverse effects following initial evaluation  Objective: See treatment diary below      Assessment: Patient demonstrated fair tolerance to PT with min increase in soreness and report of significant muscle fatigue following today's session  She required moderate cueing during lateral SLR in order to maintain neutral hip and to decreased hip flexor compensation  In addition, quadriceps tired easily as evidenced during SLR and LAQ exercises  Plan: Continue per plan of care  Precautions: anemia, chronic pain disorder, migraines, Hx of seizures as a child        Manual  3/2 3/11           Desensitization of incision site  nv           PROM R knee  JAB                                                      Exercise Diary  3/2 3/11           bike  6'           Quad set 5"x10 5"x5            SLR 3 way x10 ea 15x ea           clamshells             LAQ  15x5"           Gastroc Stretch  30"x3           Hamstring Stretch  30"x3           Mini Squat             Step Ups                                                                                                                                                                Modalities  3/2            CP prn

## 2020-03-16 ENCOUNTER — APPOINTMENT (OUTPATIENT)
Dept: PHYSICAL THERAPY | Facility: CLINIC | Age: 40
End: 2020-03-16
Payer: COMMERCIAL

## 2020-03-18 ENCOUNTER — APPOINTMENT (OUTPATIENT)
Dept: PHYSICAL THERAPY | Facility: CLINIC | Age: 40
End: 2020-03-18
Payer: COMMERCIAL

## 2020-03-23 ENCOUNTER — APPOINTMENT (OUTPATIENT)
Dept: PHYSICAL THERAPY | Facility: CLINIC | Age: 40
End: 2020-03-23
Payer: COMMERCIAL

## 2020-05-20 ENCOUNTER — OFFICE VISIT (OUTPATIENT)
Dept: OBGYN CLINIC | Facility: CLINIC | Age: 40
End: 2020-05-20

## 2020-05-20 VITALS
WEIGHT: 170.2 LBS | DIASTOLIC BLOOD PRESSURE: 60 MMHG | HEART RATE: 79 BPM | TEMPERATURE: 97 F | SYSTOLIC BLOOD PRESSURE: 130 MMHG | BODY MASS INDEX: 23.83 KG/M2 | HEIGHT: 71 IN

## 2020-05-20 DIAGNOSIS — Z87.42: ICD-10-CM

## 2020-05-20 DIAGNOSIS — Z11.3 SCREENING FOR GONORRHEA: ICD-10-CM

## 2020-05-20 DIAGNOSIS — Z01.411 ENCOUNTER FOR GYNECOLOGICAL EXAMINATION WITH ABNORMAL FINDING: Primary | ICD-10-CM

## 2020-05-20 DIAGNOSIS — Z12.31 SCREENING MAMMOGRAM, ENCOUNTER FOR: ICD-10-CM

## 2020-05-20 DIAGNOSIS — N92.0 MENORRHAGIA WITH REGULAR CYCLE: ICD-10-CM

## 2020-05-20 DIAGNOSIS — Z11.8 SCREENING FOR CHLAMYDIAL DISEASE: ICD-10-CM

## 2020-05-20 DIAGNOSIS — Z12.4 PAP SMEAR FOR CERVICAL CANCER SCREENING: ICD-10-CM

## 2020-05-20 PROCEDURE — 87624 HPV HI-RISK TYP POOLED RSLT: CPT | Performed by: FAMILY MEDICINE

## 2020-05-20 PROCEDURE — 87491 CHLMYD TRACH DNA AMP PROBE: CPT | Performed by: FAMILY MEDICINE

## 2020-05-20 PROCEDURE — G0143 SCR C/V CYTO,THINLAYER,RESCR: HCPCS | Performed by: PATHOLOGY

## 2020-05-20 PROCEDURE — 99386 PREV VISIT NEW AGE 40-64: CPT | Performed by: OBSTETRICS & GYNECOLOGY

## 2020-05-20 PROCEDURE — 87591 N.GONORRHOEAE DNA AMP PROB: CPT | Performed by: FAMILY MEDICINE

## 2020-05-21 LAB
C TRACH DNA SPEC QL NAA+PROBE: NEGATIVE
HPV HR 12 DNA CVX QL NAA+PROBE: NEGATIVE
HPV16 DNA CVX QL NAA+PROBE: NEGATIVE
HPV18 DNA CVX QL NAA+PROBE: NEGATIVE
N GONORRHOEA DNA SPEC QL NAA+PROBE: NEGATIVE

## 2020-05-22 ENCOUNTER — TELEPHONE (OUTPATIENT)
Dept: OBGYN CLINIC | Facility: MEDICAL CENTER | Age: 40
End: 2020-05-22

## 2020-05-29 LAB
LAB AP GYN PRIMARY INTERPRETATION: NORMAL
PATH INTERP SPEC-IMP: NORMAL

## 2020-06-01 ENCOUNTER — TELEPHONE (OUTPATIENT)
Dept: OBGYN CLINIC | Facility: CLINIC | Age: 40
End: 2020-06-01

## 2020-06-09 ENCOUNTER — APPOINTMENT (OUTPATIENT)
Dept: RADIOLOGY | Facility: MEDICAL CENTER | Age: 40
End: 2020-06-09
Payer: COMMERCIAL

## 2020-06-09 ENCOUNTER — OFFICE VISIT (OUTPATIENT)
Dept: OBGYN CLINIC | Facility: MEDICAL CENTER | Age: 40
End: 2020-06-09
Payer: COMMERCIAL

## 2020-06-09 VITALS
DIASTOLIC BLOOD PRESSURE: 89 MMHG | WEIGHT: 173 LBS | HEIGHT: 71 IN | SYSTOLIC BLOOD PRESSURE: 145 MMHG | BODY MASS INDEX: 24.22 KG/M2 | HEART RATE: 88 BPM

## 2020-06-09 DIAGNOSIS — L72.0 CYST OF SKIN AND SUBCUTANEOUS TISSUE: ICD-10-CM

## 2020-06-09 DIAGNOSIS — M25.561 RIGHT KNEE PAIN, UNSPECIFIED CHRONICITY: Primary | ICD-10-CM

## 2020-06-09 DIAGNOSIS — M25.561 RIGHT KNEE PAIN, UNSPECIFIED CHRONICITY: ICD-10-CM

## 2020-06-09 PROCEDURE — 99213 OFFICE O/P EST LOW 20 MIN: CPT | Performed by: ORTHOPAEDIC SURGERY

## 2020-06-09 PROCEDURE — 3008F BODY MASS INDEX DOCD: CPT | Performed by: OBSTETRICS & GYNECOLOGY

## 2020-06-09 PROCEDURE — 3008F BODY MASS INDEX DOCD: CPT | Performed by: ORTHOPAEDIC SURGERY

## 2020-06-09 PROCEDURE — 4004F PT TOBACCO SCREEN RCVD TLK: CPT | Performed by: ORTHOPAEDIC SURGERY

## 2020-06-09 PROCEDURE — 73564 X-RAY EXAM KNEE 4 OR MORE: CPT

## 2020-06-22 ENCOUNTER — TELEPHONE (OUTPATIENT)
Dept: OBGYN CLINIC | Facility: CLINIC | Age: 40
End: 2020-06-22

## 2020-09-02 ENCOUNTER — TELEPHONE (OUTPATIENT)
Dept: OBGYN CLINIC | Facility: CLINIC | Age: 40
End: 2020-09-02

## 2020-09-04 ENCOUNTER — HOSPITAL ENCOUNTER (OUTPATIENT)
Dept: ULTRASOUND IMAGING | Facility: HOSPITAL | Age: 40
Discharge: HOME | End: 2020-09-04
Payer: COMMERCIAL

## 2020-09-04 DIAGNOSIS — Z87.42: ICD-10-CM

## 2020-09-04 DIAGNOSIS — N92.0 MENORRHAGIA WITH REGULAR CYCLE: ICD-10-CM

## 2020-09-04 PROCEDURE — 76830 TRANSVAGINAL US NON-OB: CPT

## 2020-09-04 PROCEDURE — 76856 US EXAM PELVIC COMPLETE: CPT

## 2020-09-10 ENCOUNTER — APPOINTMENT (EMERGENCY)
Dept: RADIOLOGY | Facility: HOSPITAL | Age: 40
End: 2020-09-10
Payer: COMMERCIAL

## 2020-09-10 ENCOUNTER — HOSPITAL ENCOUNTER (EMERGENCY)
Facility: HOSPITAL | Age: 40
Discharge: HOME | End: 2020-09-10
Attending: EMERGENCY MEDICINE | Admitting: EMERGENCY MEDICINE
Payer: COMMERCIAL

## 2020-09-10 VITALS
HEART RATE: 70 BPM | TEMPERATURE: 98.2 F | SYSTOLIC BLOOD PRESSURE: 120 MMHG | WEIGHT: 160.94 LBS | RESPIRATION RATE: 16 BRPM | BODY MASS INDEX: 22.45 KG/M2 | DIASTOLIC BLOOD PRESSURE: 68 MMHG | OXYGEN SATURATION: 100 %

## 2020-09-10 DIAGNOSIS — M25.561 RIGHT KNEE PAIN: Primary | ICD-10-CM

## 2020-09-10 DIAGNOSIS — S93.401A RIGHT ANKLE SPRAIN: ICD-10-CM

## 2020-09-10 PROCEDURE — 99283 EMERGENCY DEPT VISIT LOW MDM: CPT

## 2020-09-10 PROCEDURE — 73630 X-RAY EXAM OF FOOT: CPT

## 2020-09-10 PROCEDURE — 73564 X-RAY EXAM KNEE 4 OR MORE: CPT

## 2020-09-10 PROCEDURE — 99282 EMERGENCY DEPT VISIT SF MDM: CPT | Performed by: PHYSICIAN ASSISTANT

## 2020-09-10 PROCEDURE — 73610 X-RAY EXAM OF ANKLE: CPT

## 2020-09-10 NOTE — DISCHARGE INSTRUCTIONS
Return to the ER with any new or worsening of symptoms such as but not limited to increased pain, increased swelling,numbness, tingling or any other concerning symptoms

## 2020-09-10 NOTE — Clinical Note
Antnoio Lucian was seen and treated in our emergency department on 9/10/2020  limited use of right leg x 1 week    Diagnosis:     Marchia Cooks  may return to work on return date  She may return on this date: 09/11/2020         If you have any questions or concerns, please don't hesitate to call        Charlie Umana PA-C    ______________________________           _______________          _______________  Hospital Representative                              Date                                Time

## 2020-09-10 NOTE — ED PROVIDER NOTES
History  Chief Complaint   Patient presents with    Knee Pain     Pt c/o right knee and foot pain since last night when she fell d/t her knee "giving out"; Pt denies hitting head when she fell    Foot Pain     Patient presents to the ER for evaluation of right knee, ankle, and foot pain  Patient states that yesterday when she stood up, her right knee gave out causing her ankle to roll  States that since then she has had pain in her right knee and ankle  Patient states pain is worse movement and improved with rest   Denies taking any medication for pain  Patient states she has sprained this ankle in the past   Patient also notes that she had a cyst removed from her right knee a few months ago and has had persistent pain since  Patient denies any head injury  Denies any anticoagulant use  Denies any headache, dizziness, numbness, tingling, weakness, any other concerning symptoms  Prior to Admission Medications   Prescriptions Last Dose Informant Patient Reported? Taking?    albuterol (PROVENTIL HFA,VENTOLIN HFA) 90 mcg/act inhaler   No No   Sig: Inhale 2 puffs every 4 (four) hours as needed for wheezing   diclofenac sodium (VOLTAREN) 1 %   No No   Sig: Apply 2 g topically 4 (four) times a day   ibuprofen (MOTRIN) 800 mg tablet   No No   Sig: Take 1 tablet (800 mg total) by mouth 3 (three) times a day      Facility-Administered Medications: None       Past Medical History:   Diagnosis Date    Anemia     iron def    Chronic pain disorder     back    Migraines     Pneumonia 2018    Seasonal allergies     [facility] [address] [facility]     Seizures ([facility] [address] [facility]     38 years ago       Past Surgical History:   Procedure Laterality Date    NV EXC TUMOR SOFT TISSUE THIGH/KNEE SUBFASC 5+CM Right 2/3/2020    Procedure: EXCISIONAL BIOPSY SOFT  TISSUE MASS RIGHT KNEE;  Surgeon: Esau Cam MD;  Location: [address] [place] [address];  Service: Orthopedics    TUBAL LIGATION         Family History   Problem Relation Age of Onset    Heart disease Mother     Hypertension Mother      I have reviewed and agree with the history as documented  E-Cigarette/Vaping    E-Cigarette Use Never User      E-Cigarette/Vaping Substances    Nicotine No     THC No     CBD No     Flavoring No     Other No     Unknown No      Social History     Tobacco Use    Smoking status: Current Every Day Smoker     Packs/day: 0 25    Smokeless tobacco: Never Used   Substance Use Topics    Alcohol use: No    Drug use: No       Review of Systems   Constitutional: Negative for fever  HENT: Negative for congestion, rhinorrhea and sore throat  Respiratory: Negative for shortness of breath  Cardiovascular: Negative for chest pain  Gastrointestinal: Negative for abdominal pain, nausea and vomiting  Genitourinary: Negative for dysuria  Musculoskeletal: Negative for back pain and neck pain  Skin: Negative for rash  Neurological: Negative for weakness, numbness and headaches  All other systems reviewed and are negative  Physical Exam  Physical Exam  Constitutional:       Appearance: She is well-developed  HENT:      Head: Normocephalic and atraumatic  Nose: Nose normal    Eyes:      Conjunctiva/sclera: Conjunctivae normal    Neck:      Musculoskeletal: Normal range of motion  Cardiovascular:      Rate and Rhythm: Normal rate  Pulmonary:      Effort: Pulmonary effort is normal    Abdominal:      Palpations: Abdomen is soft  Tenderness: There is no abdominal tenderness  Musculoskeletal:      Right lower leg: No edema  Left lower leg: No edema  Comments: Tenderness to palpation of right knee  No right knee effusion  Mild tenderness palpation of right lateral ankle  Mild pain with plantar and dorsiflexion of right ankle  Mild tenderness palpation over right 5th metatarsal    Skin:     General: Skin is warm  Capillary Refill: Capillary refill takes less than 2 seconds     Neurological:      Mental Status: She is alert and oriented to person, place, and time  Vital Signs  ED Triage Vitals [09/10/20 0921]   Temperature Pulse Respirations Blood Pressure SpO2   98 2 °F (36 8 °C) 70 16 120/68 100 %      Temp Source Heart Rate Source Patient Position - Orthostatic VS BP Location FiO2 (%)   Temporal Monitor -- -- --      Pain Score       8           Vitals:    09/10/20 0921   BP: 120/68   Pulse: 70         Visual Acuity      ED Medications  Medications - No data to display    Diagnostic Studies  Results Reviewed     None                 XR knee 4+ views Right injury   Final Result by Zully Mc DO (09/10 1109)   No acute osseous abnormality  Workstation performed: [id number]         XR ankle 3+ views RIGHT   Final Result by Zully Mc DO (09/10 1106)   No acute osseous abnormality  Workstation performed: [id number]         XR foot 3+ views RIGHT   Final Result by Zully Mc DO (09/10 1107)   No acute osseous abnormality  Workstation performed: [id number]                    Procedures  Procedures         ED Course  ED Course as of Sep 10 1633   Thu Sep 10, 2020   1015 Blood Pressure: 120/68   1015 Temperature: 98 2 °F (36 8 °C)   1015 Pulse: 70   1015 Respirations: 16   1015 SpO2: 100 %   1112 No acute abnormality   XR ankle 3+ views RIGHT   1113 No acute abnormality   XR foot 3+ views RIGHT   1113 No acute abnormality   XR knee 4+ views Right injury                           SBIRT 20yo+      Most Recent Value   SBIRT (23 yo +)   In order to provide better care to our patients, we are screening all of our patients for alcohol and drug use  Would it be okay to ask you these screening questions? Yes Filed at: 09/10/2020 1039   Initial Alcohol Screen: US AUDIT-C    1  How often do you have a drink containing alcohol?  0 Filed at: 09/10/2020 1039   2  How many drinks containing alcohol do you have on a typical day you are drinking? 0 Filed at: 09/10/2020 1039   3b  FEMALE Any Age, or MALE 65+:  How often do you have 4 or more drinks on one occassion? 0 Filed at: 09/10/2020 1039   Audit-C Score  0 Filed at: 09/10/2020 1039   CATALINA: How many times in the past year have you    Used an illegal drug or used a prescription medication for non-medical reasons? Never Filed at: 09/10/2020 1039                  MDM     Imaging benign  Patient given Ace wrap and crutches in the ER  Discussed symptomatic treatment and follow-up with orthopedics if symptoms persist     Patient in no acute distress throughout ER stay  Vitals stable and reassuring  Patient stable for discharge at this time  Reviewed plan with patient/family  Reviewed red flag symptoms and strict return instructions  Patient/family voiced understanding and agreement to plan  Patient/family had opportunity to ask questions and all questions were answered at bedside  Disposition  Final diagnoses:   Right knee pain   Right ankle sprain     Time reflects when diagnosis was documented in both MDM as applicable and the Disposition within this note     Time User Action Codes Description Comment    9/10/2020 11:13 AM Charlotte Calico Right knee pain     9/10/2020 11:13 AM Dread Pereira [[id number]] Right ankle sprain       ED Disposition     ED Disposition Condition Date/Time Comment    Discharge Stable Thu Sep 10, 2020 [address] discharge to home/self care              Follow-up Information     Follow up With Specialties Details Why Contact Info Additional Maurice Gonzáles [address] Orthopedic Surgery  Follow up with orthopedics if symptoms persist [address] [facility] [address] [phone]  [address] [place] [address], [address] [facility], [address] [place] [address], [id number] [phone]          Discharge Medication List as of 9/10/2020 11:14 AM      CONTINUE these medications which have NOT CHANGED    Details   albuterol (PROVENTIL HFA,VENTOLIN HFA) 90 mcg/act inhaler Inhale 2 puffs every 4 (four) hours as needed for wheezing, Starting Thu 3/28/2019, Print      diclofenac sodium (VOLTAREN) 1 % Apply 2 g topically 4 (four) times a day, Starting Tue 6/9/2020, Normal      ibuprofen (MOTRIN) 800 mg tablet Take 1 tablet (800 mg total) by mouth 3 (three) times a day, Starting Fri 12/20/2019, Print           No discharge procedures on file      PDMP Review       Value Time User    PDMP Reviewed  Yes 2/4/2020 10:31 AM Kota Barrera MD          ED Provider  Electronically Signed by           Ileana Rubio PA-C  09/10/20 [id number]

## 2020-09-11 ENCOUNTER — OFFICE VISIT (OUTPATIENT)
Dept: OBGYN CLINIC | Facility: CLINIC | Age: 40
End: 2020-09-11
Payer: COMMERCIAL

## 2020-09-11 VITALS
HEIGHT: 71 IN | TEMPERATURE: 97.6 F | SYSTOLIC BLOOD PRESSURE: 115 MMHG | BODY MASS INDEX: 22.4 KG/M2 | DIASTOLIC BLOOD PRESSURE: 60 MMHG | WEIGHT: 160 LBS

## 2020-09-11 DIAGNOSIS — M25.561 RIGHT KNEE PAIN, UNSPECIFIED CHRONICITY: Primary | ICD-10-CM

## 2020-09-11 PROCEDURE — 99213 OFFICE O/P EST LOW 20 MIN: CPT | Performed by: ORTHOPAEDIC SURGERY

## 2020-09-11 PROCEDURE — 3008F BODY MASS INDEX DOCD: CPT | Performed by: OBSTETRICS & GYNECOLOGY

## 2020-09-11 PROCEDURE — 4004F PT TOBACCO SCREEN RCVD TLK: CPT | Performed by: ORTHOPAEDIC SURGERY

## 2020-09-11 NOTE — LETTER
September 11, 2020     Patient: Jen Bautista   YOB: 1980   Date of Visit: 9/11/2020       To Whom it May Concern:    Jen Bautista is under my professional care  She was seen in my office on 9/11/2020  She may return to work on 9/11/20 without restrictions       If you have any questions or concerns, please don't hesitate to call           Sincerely,          Maryellen Pressley MD        CC: No Recipients

## 2020-09-11 NOTE — PROGRESS NOTES
PROGRESS NOTE:  4 months Post-operative Visit KNEE Soft tissue biopsy    Date of Surgery: 2/3/20      SUBJECTIVE:    Patient returns to office unexpectedly  Reports that she was getting out of bed two days ago and felt like her right leg "gave out"  States she then had pain in the right knee and ankle  She did go to the ED yesterday and radiographs were obtained  She scheduled followup in our office to get things checked out  States the anterior knee feels "tight"  OBJECTIVE:  Right Knee: Well healed incision  No palpable mass at anterolateral joint line  Extension: 0  Flexion: 120  Stable varus/valgus  Negative Kaye  Sensation Intact to Light Touch in Sural, Saphenous, Tibial, Superficial Peroneal, and Deep Peroneal Nerve Distribution  Motor function 5 out of 5 strength in Tibialis Anterior, Gastrocnemius, Soleus, Extensor Hallucis Longus, and Flexor Hallucis Longus Muscles  Extremity Warm and Well Perfused  Brisk Capillary Refill in Toes  Right hip:  Flexion 120 (5/5 strength)  No pain with logroll  IR/ER: 30/40      POST-OP COMPLICATIONS SINCE SURGERY:  None    RADIOGRAPHS:  Plain radiographs of the right knee were obtained in the ED yesterday  No obvious fracture or dislocation on my review  No foreign body  No significant joint space narrowing, osteophytes or other radiographic signs of arthritis  ASSESSMENT:  7 months s/p excision of synovial cyst right knee  PLAN:  - discussed with Ms Clara Alegre that she does not have signs of arthritis of soft-tissue injury on the radiographs or prior MRI and she does not describe history concerning for re-injury to warrant a repeat MRI  - on exam there is no recurrence of the synovial cyst and the incision is well healed  - recommend repeat round of PT to work on strengthening and ROM of the knee  - plan for followup in 6 weeks if not improved    - if continued complaints discussed possible repeat MRI or second opinion with one of my sports medicine colleagues to see if there is something I am missing  All questions were answered and the patient expressed understanding  Bertha Aburto MD  Adult Reconstruction  Department of [place] [address]  10:15 AM

## 2020-09-15 ENCOUNTER — TELEPHONE (OUTPATIENT)
Dept: OBGYN CLINIC | Facility: CLINIC | Age: 40
End: 2020-09-15

## 2020-09-16 ENCOUNTER — TELEMEDICINE (OUTPATIENT)
Dept: OBGYN CLINIC | Facility: CLINIC | Age: 40
End: 2020-09-16

## 2020-09-16 DIAGNOSIS — N92.0 MENORRHAGIA WITH REGULAR CYCLE: Primary | ICD-10-CM

## 2020-09-16 DIAGNOSIS — Z71.2 ENCOUNTER TO DISCUSS TEST RESULTS: ICD-10-CM

## 2020-09-16 PROCEDURE — 4004F PT TOBACCO SCREEN RCVD TLK: CPT | Performed by: OBSTETRICS & GYNECOLOGY

## 2020-09-16 PROCEDURE — 99212 OFFICE O/P EST SF 10 MIN: CPT | Performed by: OBSTETRICS & GYNECOLOGY

## 2020-09-16 NOTE — PROGRESS NOTES
Virtual Brief Visit    Assessment/Plan:    Problem List Items Addressed This Visit        Other    Menorrhagia with regular cycle - Primary      Other Visit Diagnoses     Encounter to discuss test results                    Reason for visit is   Chief Complaint   Patient presents with    Virtual Brief Visit    Virtual Brief Visit     results review        Encounter provider Wayne Garsia MD    Provider located at [address] [place] [address],[address]  [address] [id number]  [phone]    Recent Visits  Date Type Provider Dept   09/15/20 Telephone Jermain Jasmyn, [address] recent visits within past 7 days and meeting all other requirements     Today's Visits  Date Type Provider Dept   09/16/20 Telemedicine Wayne Garsia MD [address] [facility] [address] [place] today's visits and meeting all other requirements     Future Appointments  No visits were found meeting these conditions  Showing future appointments within next 150 days and meeting all other requirements        After connecting through [employer] and patient was informed that this is a secure, HIPAA-compliant platform  She agrees to proceed  , the patient was identified by name and date of birth  Michael Lang was informed that this is a telemedicine visit and that the visit is being conducted through telephone  My office door was closed  No one else was in the room  She acknowledged consent and understanding of privacy and security of the platform  The patient has agreed to participate and understands she can discontinue the visit at any time  Patient is aware this is a billable service  Subjective    Michael Lang is a 36 y o  female presents to discuss her US results  See below  UTERUS:  The uterus is anteverted in position, measuring 9 6 x 5 8 x 6 5 cm     Contour and echotexture appear normal   The cervix shows nabothian cysts       ENDOMETRIUM: Normal caliber of 7 mm  Homogeneous and normal in appearance      OVARIES/ADNEXA:  Right ovary:  2 6 x 2 5 x 1 8 cm  No suspicious right ovarian abnormality  Doppler flow within normal limits      Left ovary:  2 3 x 1 4 x 1 2 cm  No suspicious left ovarian abnormality  Doppler flow within normal limits      No suspicious adnexal mass or loculated collections  There is no significant free fluid      IMPRESSION:     1  Unremarkable pelvic ultrasound  She has not gone for her bloodwork (CBC, TSH) yet  She reports her menses are 5-6 days long but the 2nd, 3rd, and 4th tend to by heavy  She declines any therapies for this  I told her she can call at any time if she changes her mind  HPI     Past Medical History:   Diagnosis Date    Anemia     iron def    Chronic pain disorder     back    Migraines     Pneumonia 2018    Seasonal allergies     [facility] [address]     Seizures ([facility] [address] )     38 years ago       Past Surgical History:   Procedure Laterality Date    DE EXC TUMOR SOFT TISSUE THIGH/KNEE SUBFASC 5+CM Right 2/3/2020    Procedure: EXCISIONAL BIOPSY SOFT  TISSUE MASS RIGHT KNEE;  Surgeon: Esau Cam MD;  Location: [facility] MAIN OR;  Service: Orthopedics    TUBAL LIGATION         Current Outpatient Medications   Medication Sig Dispense Refill    albuterol (PROVENTIL HFA,VENTOLIN HFA) 90 mcg/act inhaler Inhale 2 puffs every 4 (four) hours as needed for wheezing 1 Inhaler 0    diclofenac sodium (VOLTAREN) 1 % Apply 2 g topically 4 (four) times a day 1 Tube 1    ibuprofen (MOTRIN) 800 mg tablet Take 1 tablet (800 mg total) by mouth 3 (three) times a day 21 tablet 0     No current facility-administered medications for this visit  No Known Allergies    Review of Systems    There were no vitals filed for this visit        I spent 5 minutes directly with the patient during this visit    VIRTUAL VISIT DISCLAIMER    Natalia Liang acknowledges that she has consented to an online visit or consultation  She understands that the online visit is based solely on information provided by her, and that, in the absence of a face-to-face physical evaluation by the physician, the diagnosis she receives is both limited and provisional in terms of accuracy and completeness  This is not intended to replace a full medical face-to-face evaluation by the physician  Darion Seaman understands and accepts these terms

## 2020-09-21 ENCOUNTER — EVALUATION (OUTPATIENT)
Dept: PHYSICAL THERAPY | Facility: CLINIC | Age: 40
End: 2020-09-21
Payer: COMMERCIAL

## 2020-09-21 DIAGNOSIS — M25.561 RIGHT KNEE PAIN, UNSPECIFIED CHRONICITY: ICD-10-CM

## 2020-09-21 PROCEDURE — 97162 PT EVAL MOD COMPLEX 30 MIN: CPT | Performed by: PHYSICAL THERAPIST

## 2020-09-21 PROCEDURE — 97110 THERAPEUTIC EXERCISES: CPT | Performed by: PHYSICAL THERAPIST

## 2020-09-21 NOTE — PROGRESS NOTES
PT Evaluation     Today's date: 2020  Patient name: Maggie Siu  : 1980  MRN: [id number]  Referring provider: Leo Wallace MD  Dx:   Encounter Diagnosis     ICD-10-CM    1  Right knee pain, unspecified chronicity  M25 561 Ambulatory referral to Physical Therapy                  Assessment  Assessment details: Pt is a 36y o  year old female presenting to physical therapy for Right knee pain after fall 2 weeks ago on R knee  X-ray was negative for Fx, but patient had cyst surgically removed on 2020, but did not complete therapy due to Covid-19  She presents with the following impairments: high levels of knee pain, limited AROM,  And RLE weakness affecting their function with stairs, squatting, standing ,working, walking, and lifting off floor  Pt will benefit from skilled physical therapy to address functional limitations noted in evaluation and meet patient goals  Impairments: abnormal gait, abnormal muscle firing, abnormal or restricted ROM, abnormal movement, activity intolerance, impaired balance, impaired physical strength, lacks appropriate home exercise program, weight-bearing intolerance and poor body mechanics    Symptom irritability: high  Goals  ST  Pt will improve pain w activity to less than 6/10   2   Pt will improve R KNEE AROM to 110 degrees of flexion  LT  Pt will demonstrate SLR w no lag and good R quad activation  2  Pt will demonstrate good squat mechanics with full depth and minimal pain  3  Pt will be I w HEP    3     Plan  Planned modality interventions: electrical stimulation/Russian stimulation  Planned therapy interventions: joint mobilization, manual therapy, abdominal trunk stabilization, neuromuscular re-education, balance, balance/weight bearing training, patient education, coordination, strengthening, stretching, therapeutic activities, therapeutic exercise, flexibility, functional ROM exercises, graded exercise, graded motor and home exercise program  Frequency: 2x week  Duration in weeks: 6        Subjective Evaluation    History of Present Illness  Mechanism of injury: Pt reports R knee surgery on  to remove a cyst   Pt now has trouble bending R knee and it ramiro occasionally  She stopped PT in the Spring due to pandemic  She fell about 2 weeks when her R LE gave out and she fell on her R knee  She now has increased pain, but her x-rays were negative and she still has difficulty bending and straightening her knee as well as going up/down steps  Pain  Current pain ratin  At worst pain rating: 10    Treatments  Previous treatment: physical therapy  Current treatment: physical therapy  Patient Goals  Patient goals for therapy: decreased pain, improved balance, increased strength and independence with ADLs/IADLs  Patient goal: stairs        Objective     Tenderness     Right Knee   Tenderness in the lateral joint line, LCL (distal), LCL (proximal), MCL (distal), MCL (proximal), medial joint line and patellar tendon  No tenderness in the lateral patella, quadriceps tendon and superior patella  Active Range of Motion   Left Knee   Flexion: 135 degrees   Extension: -5 degrees   Extensor la degrees     Right Knee   Flexion: 90 degrees with pain  Extension: 5 degrees with pain  Extensor la degrees with pain    Strength/Myotome Testing     Left Knee   Flexion: 4+  Extension: 5  Quadriceps contraction: good    Right Knee   Flexion: 4-  Extension: 4-  Quadriceps contraction: poor    Tests     Right Knee   Positive valgus stress test at 0 degrees  Negative anterior Lachman and varus stress test at 0 degrees  General Comments:      Knee Comments  Poor squat ability with limited depth due to pain  Unable to step up on R LE onto stool due to pain               Precautions: Cyst removal in R knee in 2020    Date             Visit # IE            FOTO 40             Re-eval IE                    Manuals  Knee PROM SF                                                   Neuro Re-Ed             Quad Set 5x10"            Mini Squat             SLS  Tandem Stance                                                                 Ther Ex             Bike             Heel Slides 10x10"            SLR 5x                                                                              Ther Activity                                       Gait Training                                       Modalities

## 2020-09-24 ENCOUNTER — OFFICE VISIT (OUTPATIENT)
Dept: PHYSICAL THERAPY | Facility: CLINIC | Age: 40
End: 2020-09-24
Payer: COMMERCIAL

## 2020-09-24 DIAGNOSIS — M25.561 RIGHT KNEE PAIN, UNSPECIFIED CHRONICITY: Primary | ICD-10-CM

## 2020-09-24 PROCEDURE — 97112 NEUROMUSCULAR REEDUCATION: CPT

## 2020-09-24 PROCEDURE — 97110 THERAPEUTIC EXERCISES: CPT

## 2020-09-24 NOTE — PROGRESS NOTES
Daily Note     Today's date: 2020  Patient name: Tiana Valencia  : 1980  MRN: [id number]  Referring provider: Maggie Norris MD  Dx:   Encounter Diagnosis     ICD-10-CM    1  Right knee pain, unspecified chronicity  M25 561                   Subjective: Pt reports with c/o knee pain with excessive activity and stair climbing, but minimal pain at rest       Objective: See treatment diary below      Assessment: Tolerated treatment well  Good tolerance with addition of exercises  Some end range pain reported with passive flexion, but tolerable  Patient demonstrated fatigue post treatment, exhibited good technique with therapeutic exercises and would benefit from continued PT      Plan: Continue per plan of care  Progress note during next visit        Precautions: Cyst removal in R knee in 2020    Date            Visit # IE            FOTO 40             Re-eval IE                    Manuals            Knee PROM SF TE                                                  Neuro Re-Ed             Quad Set 5x10" 5"x10           Mini Squat  x10           SLS  Tandem Stance  30"x2 ea                                                               Ther Ex             Bike  5 min           Heel Slides 10x10" 5"x10           SLR 5x  x10                                                                            Ther Activity                                       Gait Training                                       Modalities

## 2020-10-01 ENCOUNTER — APPOINTMENT (OUTPATIENT)
Dept: PHYSICAL THERAPY | Facility: CLINIC | Age: 40
End: 2020-10-01
Payer: COMMERCIAL

## 2020-10-05 ENCOUNTER — APPOINTMENT (OUTPATIENT)
Dept: PHYSICAL THERAPY | Facility: CLINIC | Age: 40
End: 2020-10-05
Payer: COMMERCIAL

## 2020-10-07 ENCOUNTER — OFFICE VISIT (OUTPATIENT)
Dept: PHYSICAL THERAPY | Facility: CLINIC | Age: 40
End: 2020-10-07
Payer: COMMERCIAL

## 2020-10-07 DIAGNOSIS — M25.561 RIGHT KNEE PAIN, UNSPECIFIED CHRONICITY: Primary | ICD-10-CM

## 2020-10-07 PROCEDURE — 97140 MANUAL THERAPY 1/> REGIONS: CPT

## 2020-10-07 PROCEDURE — 97110 THERAPEUTIC EXERCISES: CPT

## 2020-10-08 ENCOUNTER — APPOINTMENT (OUTPATIENT)
Dept: PHYSICAL THERAPY | Facility: CLINIC | Age: 40
End: 2020-10-08
Payer: COMMERCIAL

## 2020-10-12 ENCOUNTER — APPOINTMENT (OUTPATIENT)
Dept: PHYSICAL THERAPY | Facility: CLINIC | Age: 40
End: 2020-10-12
Payer: COMMERCIAL

## 2020-10-13 ENCOUNTER — OFFICE VISIT (OUTPATIENT)
Dept: PHYSICAL THERAPY | Facility: CLINIC | Age: 40
End: 2020-10-13
Payer: COMMERCIAL

## 2020-10-13 DIAGNOSIS — M25.561 RIGHT KNEE PAIN, UNSPECIFIED CHRONICITY: Primary | ICD-10-CM

## 2020-10-13 PROCEDURE — 97140 MANUAL THERAPY 1/> REGIONS: CPT

## 2020-10-13 PROCEDURE — 97110 THERAPEUTIC EXERCISES: CPT

## 2020-10-20 ENCOUNTER — APPOINTMENT (OUTPATIENT)
Dept: PHYSICAL THERAPY | Facility: CLINIC | Age: 40
End: 2020-10-20
Payer: COMMERCIAL

## 2020-10-22 ENCOUNTER — APPOINTMENT (OUTPATIENT)
Dept: PHYSICAL THERAPY | Facility: CLINIC | Age: 40
End: 2020-10-22
Payer: COMMERCIAL

## 2020-10-28 ENCOUNTER — APPOINTMENT (OUTPATIENT)
Dept: PHYSICAL THERAPY | Facility: CLINIC | Age: 40
End: 2020-10-28
Payer: COMMERCIAL

## 2021-01-05 ENCOUNTER — TELEPHONE (OUTPATIENT)
Dept: OBGYN CLINIC | Facility: CLINIC | Age: 41
End: 2021-01-05

## 2021-01-14 ENCOUNTER — HOSPITAL ENCOUNTER (EMERGENCY)
Facility: HOSPITAL | Age: 41
Discharge: HOME | End: 2021-01-14
Attending: EMERGENCY MEDICINE | Admitting: EMERGENCY MEDICINE
Payer: COMMERCIAL

## 2021-01-14 ENCOUNTER — APPOINTMENT (EMERGENCY)
Dept: CT IMAGING | Facility: HOSPITAL | Age: 41
End: 2021-01-14
Payer: COMMERCIAL

## 2021-01-14 VITALS
HEART RATE: 61 BPM | TEMPERATURE: 97.4 F | BODY MASS INDEX: 23.09 KG/M2 | DIASTOLIC BLOOD PRESSURE: 95 MMHG | OXYGEN SATURATION: 100 % | RESPIRATION RATE: 16 BRPM | WEIGHT: 165.57 LBS | SYSTOLIC BLOOD PRESSURE: 138 MMHG

## 2021-01-14 DIAGNOSIS — R10.9 ABDOMINAL PAIN: Primary | ICD-10-CM

## 2021-01-14 LAB
ALBUMIN SERPL BCP-MCNC: 3.6 G/DL (ref 3.5–5)
ALP SERPL-CCNC: 60 U/L (ref 46–116)
ALT SERPL W P-5'-P-CCNC: 17 U/L (ref 12–78)
ANION GAP SERPL CALCULATED.3IONS-SCNC: 4 MMOL/L (ref 4–13)
AST SERPL W P-5'-P-CCNC: 28 U/L (ref 5–45)
BACTERIA UR QL AUTO: ABNORMAL /HPF
BASOPHILS # BLD AUTO: 0.01 THOUSANDS/ΜL (ref 0–0.1)
BASOPHILS NFR BLD AUTO: 0 % (ref 0–1)
BILIRUB SERPL-MCNC: 0.32 MG/DL (ref 0.2–1)
BILIRUB UR QL STRIP: NEGATIVE
BUN SERPL-MCNC: 11 MG/DL (ref 5–25)
CALCIUM SERPL-MCNC: 8.9 MG/DL (ref 8.3–10.1)
CHLORIDE SERPL-SCNC: 105 MMOL/L (ref 100–108)
CLARITY UR: ABNORMAL
CO2 SERPL-SCNC: 28 MMOL/L (ref 21–32)
COLOR UR: YELLOW
CREAT SERPL-MCNC: 0.71 MG/DL (ref 0.6–1.3)
EOSINOPHIL # BLD AUTO: 0.11 THOUSAND/ΜL (ref 0–0.61)
EOSINOPHIL NFR BLD AUTO: 2 % (ref 0–6)
ERYTHROCYTE [DISTWIDTH] IN BLOOD BY AUTOMATED COUNT: 13.5 % (ref 11.6–15.1)
EXT PREG TEST URINE: NEGATIVE
EXT. CONTROL ED NAV: NORMAL
GFR SERPL CREATININE-BSD FRML MDRD: 123 ML/MIN/1.73SQ M
GLUCOSE SERPL-MCNC: 88 MG/DL (ref 65–140)
GLUCOSE UR STRIP-MCNC: NEGATIVE MG/DL
HCT VFR BLD AUTO: 43.1 % (ref 34.8–46.1)
HGB BLD-MCNC: 12.8 G/DL (ref 11.5–15.4)
HGB UR QL STRIP.AUTO: ABNORMAL
IMM GRANULOCYTES # BLD AUTO: 0.01 THOUSAND/UL (ref 0–0.2)
IMM GRANULOCYTES NFR BLD AUTO: 0 % (ref 0–2)
KETONES UR STRIP-MCNC: NEGATIVE MG/DL
LEUKOCYTE ESTERASE UR QL STRIP: NEGATIVE
LIPASE SERPL-CCNC: 117 U/L (ref 73–393)
LYMPHOCYTES # BLD AUTO: 1.62 THOUSANDS/ΜL (ref 0.6–4.47)
LYMPHOCYTES NFR BLD AUTO: 27 % (ref 14–44)
MCH RBC QN AUTO: 25.2 PG (ref 26.8–34.3)
MCHC RBC AUTO-ENTMCNC: 29.7 G/DL (ref 31.4–37.4)
MCV RBC AUTO: 85 FL (ref 82–98)
MONOCYTES # BLD AUTO: 0.28 THOUSAND/ΜL (ref 0.17–1.22)
MONOCYTES NFR BLD AUTO: 5 % (ref 4–12)
NEUTROPHILS # BLD AUTO: 3.94 THOUSANDS/ΜL (ref 1.85–7.62)
NEUTS SEG NFR BLD AUTO: 66 % (ref 43–75)
NITRITE UR QL STRIP: NEGATIVE
NON-SQ EPI CELLS URNS QL MICRO: ABNORMAL /HPF
NRBC BLD AUTO-RTO: 0 /100 WBCS
PH UR STRIP.AUTO: 8 [PH] (ref 4.5–8)
PLATELET # BLD AUTO: 199 THOUSANDS/UL (ref 149–390)
PMV BLD AUTO: 10.8 FL (ref 8.9–12.7)
POTASSIUM SERPL-SCNC: 5.1 MMOL/L (ref 3.5–5.3)
PROT SERPL-MCNC: 8.1 G/DL (ref 6.4–8.2)
PROT UR STRIP-MCNC: NEGATIVE MG/DL
RBC # BLD AUTO: 5.07 MILLION/UL (ref 3.81–5.12)
RBC #/AREA URNS AUTO: ABNORMAL /HPF
SODIUM SERPL-SCNC: 137 MMOL/L (ref 136–145)
SP GR UR STRIP.AUTO: 1.02 (ref 1–1.03)
UROBILINOGEN UR QL STRIP.AUTO: 0.2 E.U./DL
WBC # BLD AUTO: 5.97 THOUSAND/UL (ref 4.31–10.16)
WBC #/AREA URNS AUTO: ABNORMAL /HPF

## 2021-01-14 PROCEDURE — 80053 COMPREHEN METABOLIC PANEL: CPT | Performed by: EMERGENCY MEDICINE

## 2021-01-14 PROCEDURE — 83690 ASSAY OF LIPASE: CPT | Performed by: EMERGENCY MEDICINE

## 2021-01-14 PROCEDURE — 99284 EMERGENCY DEPT VISIT MOD MDM: CPT | Performed by: EMERGENCY MEDICINE

## 2021-01-14 PROCEDURE — 36415 COLL VENOUS BLD VENIPUNCTURE: CPT | Performed by: EMERGENCY MEDICINE

## 2021-01-14 PROCEDURE — 85025 COMPLETE CBC W/AUTO DIFF WBC: CPT | Performed by: EMERGENCY MEDICINE

## 2021-01-14 PROCEDURE — 81001 URINALYSIS AUTO W/SCOPE: CPT

## 2021-01-14 PROCEDURE — 96374 THER/PROPH/DIAG INJ IV PUSH: CPT

## 2021-01-14 PROCEDURE — 96361 HYDRATE IV INFUSION ADD-ON: CPT

## 2021-01-14 PROCEDURE — G1004 CDSM NDSC: HCPCS

## 2021-01-14 PROCEDURE — 99284 EMERGENCY DEPT VISIT MOD MDM: CPT

## 2021-01-14 PROCEDURE — 74177 CT ABD & PELVIS W/CONTRAST: CPT

## 2021-01-14 PROCEDURE — 81025 URINE PREGNANCY TEST: CPT | Performed by: EMERGENCY MEDICINE

## 2021-01-14 RX ORDER — KETOROLAC TROMETHAMINE 30 MG/ML
15 INJECTION, SOLUTION INTRAMUSCULAR; INTRAVENOUS ONCE
Status: COMPLETED | OUTPATIENT
Start: 2021-01-14 | End: 2021-01-14

## 2021-01-14 RX ADMIN — IOHEXOL 100 ML: 350 INJECTION, SOLUTION INTRAVENOUS at 11:30

## 2021-01-14 RX ADMIN — SODIUM CHLORIDE 1000 ML: 0.9 INJECTION, SOLUTION INTRAVENOUS at 10:42

## 2021-01-14 RX ADMIN — KETOROLAC TROMETHAMINE 15 MG: 30 INJECTION, SOLUTION INTRAMUSCULAR at 10:43

## 2021-01-14 NOTE — ED PROVIDER NOTES
History  Chief Complaint   Patient presents with    Abdominal Pain     reports right lower quadrant abdominal pain, no other complaints  36 y o  F w/h/o iron deficiency anemia, s/p tubal ligation p/w periumbilical/RLQ pain x "couple nights "  Pt reports she has been woken up each night at 3am with this pain  Feels like "a knot" and "like menstrual cramps "  Has no pain during the day  Denies F/C, N/V/D/C, urinary complaints, vaginal complaints  History provided by:  Patient   used: No    Abdominal Pain  Pain location:  RLQ and periumbilical  Pain quality: cramping    Pain radiates to:  Does not radiate  Duration:  2 days  Timing:  Intermittent  Progression:  Unchanged  Chronicity:  New  Context: previous surgery (Tubal ligation)    Relieved by: Ginger ale  Worsened by:  Nothing  Ineffective treatments:  None tried  Associated symptoms: no chills, no constipation, no cough, no diarrhea, no dysuria, no fever, no hematuria, no nausea, no vaginal bleeding, no vaginal discharge and no vomiting        Prior to Admission Medications   Prescriptions Last Dose Informant Patient Reported? Taking?    albuterol (PROVENTIL HFA,VENTOLIN HFA) 90 mcg/act inhaler More than a month at Unknown time  No No   Sig: Inhale 2 puffs every 4 (four) hours as needed for wheezing   diclofenac sodium (VOLTAREN) 1 % Not Taking at Unknown time  No No   Sig: Apply 2 g topically 4 (four) times a day   Patient not taking: Reported on 1/14/2021   ibuprofen (MOTRIN) 800 mg tablet Not Taking at Unknown time  No No   Sig: Take 1 tablet (800 mg total) by mouth 3 (three) times a day   Patient not taking: Reported on 1/14/2021      Facility-Administered Medications: None       Past Medical History:   Diagnosis Date    Anemia     iron def    Chronic pain disorder     back    Migraines     Pneumonia 2018    Seasonal allergies     [facility] [address] [facility]     Seizures ([facility] [address] [facility]     38 years ago       Past Surgical History:   Procedure Laterality Date    PA EXC TUMOR SOFT TISSUE THIGH/KNEE SUBFASC 5+CM Right 2/3/2020    Procedure: EXCISIONAL BIOPSY SOFT  TISSUE MASS RIGHT KNEE;  Surgeon: Dom Tineo MD;  Location: [facility] MAIN OR;  Service: Orthopedics    TUBAL LIGATION         Family History   Problem Relation Age of Onset    Heart disease Mother     Hypertension Mother      I have reviewed and agree with the history as documented  E-Cigarette/Vaping    E-Cigarette Use Never User      E-Cigarette/Vaping Substances    Nicotine No     THC No     CBD No     Flavoring No     Other No     Unknown No      Social History     Tobacco Use    Smoking status: Current Every Day Smoker     Packs/day: 0 25    Smokeless tobacco: Never Used   Substance Use Topics    Alcohol use: No    Drug use: No       Review of Systems   Constitutional: Negative for appetite change, chills and fever  HENT: Negative for congestion and rhinorrhea  Respiratory: Negative for cough  Gastrointestinal: Positive for abdominal pain  Negative for abdominal distention, constipation, diarrhea, nausea and vomiting  Genitourinary: Negative for dysuria, flank pain, frequency, hematuria, urgency, vaginal bleeding and vaginal discharge  Musculoskeletal: Negative for back pain  All other systems reviewed and are negative  Physical Exam  Physical Exam  Vitals signs and nursing note reviewed  Constitutional:       General: She is not in acute distress  Appearance: Normal appearance  She is well-developed  She is not ill-appearing, toxic-appearing or diaphoretic  HENT:      Head: Normocephalic  Neck:      Musculoskeletal: Normal range of motion and neck supple  Cardiovascular:      Rate and Rhythm: Normal rate and regular rhythm  Heart sounds: Normal heart sounds  No murmur  No friction rub  No gallop  Pulmonary:      Effort: Pulmonary effort is normal  No respiratory distress  Breath sounds: Normal breath sounds  No wheezing or rales  Abdominal:      General: Bowel sounds are normal  There is no distension  Palpations: Abdomen is soft  Abdomen is not rigid  There is no mass  Tenderness: There is abdominal tenderness in the right lower quadrant, periumbilical area and suprapubic area  There is no guarding or rebound  Positive signs include McBurney's sign  Negative signs include Davila's sign  Lymphadenopathy:      Cervical: No cervical adenopathy  Skin:     General: Skin is warm and dry  Coloration: Skin is not pale  Findings: No rash  Neurological:      Mental Status: She is alert and oriented to person, place, and time           Vital Signs  ED Triage Vitals [01/14/21 0941]   Temperature Pulse Respirations Blood Pressure SpO2   (!) 97 4 °F (36 3 °C) 65 18 120/73 100 %      Temp Source Heart Rate Source Patient Position - Orthostatic VS BP Location FiO2 (%)   Oral Monitor Sitting Right arm --      Pain Score       9           Vitals:    01/14/21 0941 01/14/21 1200 01/14/21 1228   BP: 120/73 133/69 138/95   Pulse: 65 75 61   Patient Position - Orthostatic VS: Sitting Lying Sitting         Visual Acuity      ED Medications  Medications   sodium chloride 0 9 % bolus 1,000 mL (0 mL Intravenous Stopped 1/14/21 1226)   ketorolac (TORADOL) injection 15 mg (15 mg Intravenous Given 1/14/21 1043)   iohexol (OMNIPAQUE) 350 MG/ML injection (SINGLE-DOSE) 100 mL (100 mL Intravenous Given 1/14/21 1130)       Diagnostic Studies  Results Reviewed     Procedure Component Value Units Date/Time    Urine Microscopic [[id number]]  (Abnormal) Collected: 01/14/21 1033    Lab Status: Final result Specimen: Urine, Clean Catch Updated: 01/14/21 1217     RBC, UA 1-2 /hpf      WBC, UA 4-10 /hpf      Epithelial Cells Occasional /hpf      Bacteria, UA Occasional /hpf     Comprehensive metabolic panel [[id number]] Collected: 01/14/21 1044    Lab Status: Final result Specimen: Blood from Arm, Left Updated: 01/14/21 1113     Sodium 137 mmol/L Potassium 5 1 mmol/L      Chloride 105 mmol/L      CO2 28 mmol/L      ANION GAP 4 mmol/L      BUN 11 mg/dL      Creatinine 0 71 mg/dL      Glucose 88 mg/dL      Calcium 8 9 mg/dL      AST 28 U/L      ALT 17 U/L      Alkaline Phosphatase 60 U/L      Total Protein 8 1 g/dL      Albumin 3 6 g/dL      Total Bilirubin 0 32 mg/dL      eGFR 123 ml/min/1 73sq m     Narrative:      National Kidney Disease Foundation guidelines for Chronic Kidney Disease (CKD):     Stage 1 with normal or high GFR (GFR > 90 mL/min/1 73 square meters)    Stage 2 Mild CKD (GFR = 60-89 mL/min/1 73 square meters)    Stage 3A Moderate CKD (GFR = 45-59 mL/min/1 73 square meters)    Stage 3B Moderate CKD (GFR = 30-44 mL/min/1 73 square meters)    Stage 4 Severe CKD (GFR = 15-29 mL/min/1 73 square meters)    Stage 5 End Stage CKD (GFR <15 mL/min/1 73 square meters)  Note: GFR calculation is accurate only with a steady state creatinine    Lipase [[id number]]  (Normal) Collected: 01/14/21 1044    Lab Status: Final result Specimen: Blood from Arm, Left Updated: 01/14/21 1113     Lipase 117 u/L     CBC and differential [[id number]]  (Abnormal) Collected: 01/14/21 1044    Lab Status: Final result Specimen: Blood from Arm, Left Updated: 01/14/21 1051     WBC 5 97 Thousand/uL      RBC 5 07 Million/uL      Hemoglobin 12 8 g/dL      Hematocrit 43 1 %      MCV 85 fL      MCH 25 2 pg      MCHC 29 7 g/dL      RDW 13 5 %      MPV 10 8 fL      Platelets 876 Thousands/uL      nRBC 0 /100 WBCs      Neutrophils Relative 66 %      Immat GRANS % 0 %      Lymphocytes Relative 27 %      Monocytes Relative 5 %      Eosinophils Relative 2 %      Basophils Relative 0 %      Neutrophils Absolute 3 94 Thousands/µL      Immature Grans Absolute 0 01 Thousand/uL      Lymphocytes Absolute 1 62 Thousands/µL      Monocytes Absolute 0 28 Thousand/µL      Eosinophils Absolute 0 11 Thousand/µL      Basophils Absolute 0 01 Thousands/µL     POCT urinalysis dipstick [[id number]] (Abnormal) Resulted: 01/14/21 1036    Lab Status: Final result Specimen: Urine Updated: 01/14/21 1036    POCT pregnancy, urine [[id number]]  (Normal) Resulted: 01/14/21 1036    Lab Status: Final result Updated: 01/14/21 1036     EXT PREG TEST UR (Ref: Negative) negative     Control valid    Urine Macroscopic, POC [[id number]]  (Abnormal) Collected: 01/14/21 1033    Lab Status: Final result Specimen: Urine Updated: 01/14/21 1034     Color, UA Yellow     Clarity, UA Slightly Cloudy     pH, UA 8 0     Leukocytes, UA Negative     Nitrite, UA Negative     Protein, UA Negative mg/dl      Glucose, UA Negative mg/dl      Ketones, UA Negative mg/dl      Urobilinogen, UA 0 2 E U /dl      Bilirubin, UA Negative     Blood, UA Small     Specific [place], UA 1 025    Narrative:      CLINITEK RESULT                 CT abdomen pelvis with contrast   Final Result by Larita Duane, MD (01/14 1148)      No acute intra-abdominal pathology  Specifically, no evidence for acute appendicitis  Cholelithiasis  1 6 cm left hepatic lobe lesion suggestive of hemangioma  Workstation performed: [id number]                    Procedures  Procedures         ED Course  ED Course as of Jan 14 1236   Thu Jan 14, 2021   9284 Orders placed      1210 Pt on phone talking in NAD  Updated pt on CT results  SBIRT 22yo+      Most Recent Value   SBIRT (22 yo +)   In order to provide better care to our patients, we are screening all of our patients for alcohol and drug use  Would it be okay to ask you these screening questions?   No Filed at: 01/14/2021 1100                    MDM  Number of Diagnoses or Management Options     Amount and/or Complexity of Data Reviewed  Clinical lab tests: reviewed and ordered  Tests in the radiology section of CPT®: reviewed and ordered  Tests in the medicine section of CPT®: ordered and reviewed        Disposition  Final diagnoses:   Abdominal pain     Time reflects when diagnosis was documented in both MDM as applicable and the Disposition within this note     Time User Action Codes Description Comment    1/14/2021 12:03 PM Allison Cheathammaxim 48 [R10 9] Abdominal pain       ED Disposition     ED Disposition Condition Date/Time Comment    Discharge Stable Thu Jan 14, 2021 12:10 PM Ronda Castillo discharge to home/self care  Follow-up Information    None         Discharge Medication List as of 1/14/2021 12:10 PM      CONTINUE these medications which have NOT CHANGED    Details   albuterol (PROVENTIL HFA,VENTOLIN HFA) 90 mcg/act inhaler Inhale 2 puffs every 4 (four) hours as needed for wheezing, Starting Thu 3/28/2019, Print      diclofenac sodium (VOLTAREN) 1 % Apply 2 g topically 4 (four) times a day, Starting Tue 6/9/2020, Normal      ibuprofen (MOTRIN) 800 mg tablet Take 1 tablet (800 mg total) by mouth 3 (three) times a day, Starting Fri 12/20/2019, Print           No discharge procedures on file      PDMP Review       Value Time User    PDMP Reviewed  Yes 2/4/2020 10:31 AM Jerica Liang MD          ED Provider  Electronically Signed by           Olga Feliz, DO  01/14/21 9686

## 2021-04-07 ENCOUNTER — HOSPITAL ENCOUNTER (EMERGENCY)
Facility: HOSPITAL | Age: 41
Discharge: HOME | End: 2021-04-07
Attending: EMERGENCY MEDICINE | Admitting: EMERGENCY MEDICINE
Payer: COMMERCIAL

## 2021-04-07 ENCOUNTER — APPOINTMENT (EMERGENCY)
Dept: RADIOLOGY | Facility: HOSPITAL | Age: 41
End: 2021-04-07
Payer: COMMERCIAL

## 2021-04-07 VITALS
DIASTOLIC BLOOD PRESSURE: 71 MMHG | BODY MASS INDEX: 22.75 KG/M2 | HEART RATE: 75 BPM | SYSTOLIC BLOOD PRESSURE: 137 MMHG | OXYGEN SATURATION: 99 % | RESPIRATION RATE: 16 BRPM | TEMPERATURE: 98.5 F | WEIGHT: 163.14 LBS

## 2021-04-07 DIAGNOSIS — M25.512 ACUTE PAIN OF LEFT SHOULDER: Primary | ICD-10-CM

## 2021-04-07 PROCEDURE — 73030 X-RAY EXAM OF SHOULDER: CPT

## 2021-04-07 PROCEDURE — 99284 EMERGENCY DEPT VISIT MOD MDM: CPT | Performed by: PHYSICIAN ASSISTANT

## 2021-04-07 PROCEDURE — 99283 EMERGENCY DEPT VISIT LOW MDM: CPT

## 2021-04-07 RX ORDER — NAPROXEN 500 MG/1
500 TABLET ORAL EVERY 12 HOURS PRN
Qty: 30 TABLET | Refills: 0 | Status: SHIPPED | OUTPATIENT
Start: 2021-04-07 | End: 2022-02-08 | Stop reason: HOSPADM

## 2021-04-07 NOTE — Clinical Note
Laurie Thorne was seen and treated in our emergency department on 4/7/2021  Diagnosis:     Sarina Mon  may return to work on return date  She may return on this date: 04/12/2021         If you have any questions or concerns, please don't hesitate to call        Mike Mueller PA-C    ______________________________           _______________          _______________  Hospital Representative                              Date                                Time

## 2021-04-07 NOTE — Clinical Note
Yaa Lazo was seen and treated in our emergency department on 4/7/2021  Diagnosis:     Melanie Cruz  may return to work on return date  She may return on this date: 04/12/2021         If you have any questions or concerns, please don't hesitate to call        Annie Arnold PA-C    ______________________________           _______________          _______________  Hospital Representative                              Date                                Time

## 2021-04-07 NOTE — Clinical Note
Yaa Lazo was seen and treated in our emergency department on 4/7/2021  Light duty until cleared by Family Doctor/Orthopedic    Diagnosis:     Melanie Cruz  may return to work on return date  She may return on this date: 04/12/2021         If you have any questions or concerns, please don't hesitate to call        Sallyanne Severs, RN    ______________________________           _______________          _______________  Hospital Representative                              Date                                Time

## 2021-04-07 NOTE — ED PROVIDER NOTES
History  Chief Complaint   Patient presents with    Shoulder Pain     Pt c/o left shoulder pain for for 2 weeks  Patient is a 40 y/o female presenting to the ED for evaluation of left shoulder pain  Pt states 2 weeks ago she began with gradually worsening left shoulder pain  Pt states she does work as a  she is lifting heavy objects often and using her left arm often  Pt states pain is worse with palpation of GH and ROM, specifically abduction against resistance  Pt has not tried any medication for pain relief  Pt denies fever, chest pain, SOB, back pain, neck pain, cough, palpations, leg pain/swelling, specific injury/trauma, rash/erythema/swelling  Prior to Admission Medications   Prescriptions Last Dose Informant Patient Reported? Taking? albuterol (PROVENTIL HFA,VENTOLIN HFA) 90 mcg/act inhaler   No No   Sig: Inhale 2 puffs every 4 (four) hours as needed for wheezing   diclofenac sodium (VOLTAREN) 1 %   No No   Sig: Apply 2 g topically 4 (four) times a day   Patient not taking: Reported on 1/14/2021   ibuprofen (MOTRIN) 800 mg tablet   No No   Sig: Take 1 tablet (800 mg total) by mouth 3 (three) times a day   Patient not taking: Reported on 1/14/2021      Facility-Administered Medications: None       Past Medical History:   Diagnosis Date    Anemia     iron def    Chronic pain disorder     back    Migraines     Pneumonia 2018    Seasonal allergies     [facility] [address]     Seizures ([facility] [address] [facility]     38 years ago       Past Surgical History:   Procedure Laterality Date    CA EXC TUMOR SOFT TISSUE THIGH/KNEE SUBFASC 5+CM Right 2/3/2020    Procedure: EXCISIONAL BIOPSY SOFT  TISSUE MASS RIGHT KNEE;  Surgeon: Jeyson Ferreira MD;  Location: [address] [place] [address];  Service: Orthopedics    TUBAL LIGATION         Family History   Problem Relation Age of Onset    Heart disease Mother     Hypertension Mother      I have reviewed and agree with the history as documented      E-Cigarette/Vaping    E-Cigarette Use Never User      E-Cigarette/Vaping Substances    Nicotine No     THC No     CBD No     Flavoring No     Other No     Unknown No      Social History     Tobacco Use    Smoking status: Current Every Day Smoker     Packs/day: 0 25     Types: Cigarettes    Smokeless tobacco: Never Used   Substance Use Topics    Alcohol use: No    Drug use: No       Review of Systems   Cardiovascular: Negative for chest pain  Musculoskeletal: Negative for back pain and neck pain  Left shoulder pain   All other systems reviewed and are negative  Physical Exam  Physical Exam  Constitutional:       Appearance: Normal appearance  HENT:      Head: Normocephalic and atraumatic  Right Ear: External ear normal       Left Ear: External ear normal       Nose: Nose normal       Mouth/Throat:      Lips: Pink  Mouth: Mucous membranes are moist    Eyes:      Extraocular Movements: Extraocular movements intact  Conjunctiva/sclera: Conjunctivae normal    Neck:      Musculoskeletal: Normal range of motion and neck supple  Pulmonary:      Effort: No tachypnea or respiratory distress  Musculoskeletal:      Left shoulder: She exhibits decreased range of motion (secondary to pain - specifically with abduction against resistance), tenderness (GH) and bony tenderness  She exhibits no swelling, no effusion, no crepitus, no deformity, no laceration and normal pulse  Left elbow: Normal       Left wrist: Normal       Cervical back: Normal       Comments: Neurovascularly intact distally  Radial pulse 2 +  Cap refill <2 seconds  Sensation intact  Skin:     General: Skin is warm  Capillary Refill: Capillary refill takes less than 2 seconds  Neurological:      Mental Status: She is alert and oriented to person, place, and time  GCS: GCS eye subscore is 4  GCS verbal subscore is 5  GCS motor subscore is 6     Psychiatric:         Mood and Affect: Mood and affect normal          Speech: Speech normal          Vital Signs  ED Triage Vitals [04/07/21 1517]   Temperature Pulse Respirations Blood Pressure SpO2   98 5 °F (36 9 °C) 75 16 137/71 99 %      Temp Source Heart Rate Source Patient Position - Orthostatic VS BP Location FiO2 (%)   Oral Monitor -- -- --      Pain Score       9           Vitals:    04/07/21 1517   BP: 137/71   Pulse: 75         Visual Acuity      ED Medications  Medications - No data to display    Diagnostic Studies  Results Reviewed     None                 XR shoulder 2+ views LEFT   ED Interpretation by Kasey Collins PA-C (04/07 2658)   No acute osseous abnormality seen by me                   Procedures  Procedures         ED Course                                           MDM  Number of Diagnoses or Management Options  Acute pain of left shoulder: new and does not require workup  Diagnosis management comments: Patient is a 38 y/o female presenting to the ED for evaluation of left shoulder pain  Pt states 2 weeks ago she began with gradually worsening left shoulder pain  Pt states she does work as a  she is lifting heavy objects often and using her left arm often  Pt states pain is worse with palpation of GH and ROM, specifically abduction against resistance  X-ray left shoulder shows no acute osseous abnormality seen by me, however the film will be reviewed by a radiologist   I informed the patient of this and if there is any discrepancy, the patient will be contacted  Do not suspect atypical present of ACS, no concerning features, vital signs WNL, pain with ROM and palpation, suspect ligamentous injury vs tendonitis  rx for diclofenac gel and naproxen provided for symptomatic relief  Information for Orthopedics provided to patient and encouraged to f/u if symptoms do not improve    Patient verbalizes understanding and agrees with plan  The management plan was discussed in detail with the patient at bedside and all questions were answered   Prior to discharge, I provided both verbal and written instructions  I discussed with the patient the signs and symptoms for which to return to the emergency department  All questions were answered and patient was comfortable with the plan of care and discharged to home  The patient agrees to return to the Emergency Department for concerns and/or progression of illness  Disposition  Final diagnoses:   Acute pain of left shoulder     Time reflects when diagnosis was documented in both MDM as applicable and the Disposition within this note     Time User Action Codes Description Comment    4/7/2021  4:03 PM Reynold Tariq Pereira [P85 [id number]] Acute pain of left shoulder       ED Disposition     ED Disposition Condition Date/Time Comment    Discharge Stable Wed Apr 7, 2021  4:03 PM Agatha Diaz discharge to home/self care  Follow-up Information     Follow up With Specialties Details Why Contact Info Additional [address] [facility] Orthopedic Surgery Schedule an appointment as soon as possible for a visit   [address] [address] [facility] [id number]  [address] [place] [address], [address] [facility], [address] [place], [place] [address], [address] [place] [id number] [phone]          Patient's Medications   Discharge Prescriptions    DICLOFENAC SODIUM (VOLTAREN) 1 %    Apply 2 g topically 4 (four) times a day       Start Date: 4/7/2021  End Date: --       Order Dose: 2 g       Quantity: 100 g    Refills: 0    NAPROXEN (NAPROSYN) 500 MG TABLET    Take 1 tablet (500 mg total) by mouth every 12 (twelve) hours as needed for mild pain       Start Date: 4/7/2021  End Date: --       Order Dose: 500 mg       Quantity: 30 tablet    Refills: 0     No discharge procedures on file      PDMP Review       Value Time User    PDMP Reviewed  Yes 2/4/2020 10:31 AM Felisa Mejia MD          ED Provider  Electronically Signed by           Beatriz Dubois KRIS  04/07/21 1606

## 2021-04-12 ENCOUNTER — OFFICE VISIT (OUTPATIENT)
Dept: OBGYN CLINIC | Facility: MEDICAL CENTER | Age: 41
End: 2021-04-12
Payer: COMMERCIAL

## 2021-04-12 VITALS
DIASTOLIC BLOOD PRESSURE: 73 MMHG | HEART RATE: 91 BPM | BODY MASS INDEX: 25.06 KG/M2 | TEMPERATURE: 97.3 F | SYSTOLIC BLOOD PRESSURE: 109 MMHG | HEIGHT: 71 IN | WEIGHT: 179 LBS

## 2021-04-12 DIAGNOSIS — M19.019 OSTEOARTHRITIS OF AC (ACROMIOCLAVICULAR) JOINT: Primary | ICD-10-CM

## 2021-04-12 PROCEDURE — 99213 OFFICE O/P EST LOW 20 MIN: CPT | Performed by: ORTHOPAEDIC SURGERY

## 2021-04-12 PROCEDURE — 3008F BODY MASS INDEX DOCD: CPT | Performed by: ORTHOPAEDIC SURGERY

## 2021-04-12 PROCEDURE — 4004F PT TOBACCO SCREEN RCVD TLK: CPT | Performed by: ORTHOPAEDIC SURGERY

## 2021-04-12 NOTE — LETTER
April 12, 2021     Patient: Laurie Thorne   YOB: 1980   Date of Visit: 4/12/2021       To Whom it May Concern:    Laurie Thorne is under my professional care  She was seen in my office on 4/12/2021  She should not preform any overhead repetitive activities with the left shoulder  She will be seen again in 6 weeks  If you have any questions or concerns, please don't hesitate to call           Sincerely,          Vitor Cui,         CC: No Recipients

## 2021-04-12 NOTE — PROGRESS NOTES
Ortho Sports Medicine Shoulder New Patient Visit     Assesment:   39 y o  female left shoulder symptomatic AC joint arthritis    Plan:    Conservative treatment:    Ice to shoulder 1-2 times daily, for 20 minutes at a time  PT for ROM and strengthening to shoulder, rotator cuff, scapular stabilizers  Work note written - for no OH repetitive motions   May discontinue use of sling    Imaging: All imaging from today was reviewed by myself and explained to the patient  Injection: We will consult Pain Management for image guided injection of left shoulder AC joint    Surgery:     No surgery is recommended at this point, continue with conservative treatment plan as noted  Follow up:    Return in about 6 weeks (around 5/24/2021) for Recheck  Chief Complaint   Patient presents with    Left Shoulder - Pain       History of Present Illness: The patient is a 39 y o , right hand dominant female whose occupation is , referred to me by the emergency room, seen in clinic for evaluation of left shoulder pain  The patient denies a history of diabetes  The patient denies a history of thyroid disorder  Pain is located anterior  The patient rates the pain as a 10/10  The pain has been present for a few weeks  Patient denies sustaining injury  Patient states that she has  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  Patient states that this pain came up over time and has begun to increase  The pain is characterized as sharp, stabbing, dull, achy  The pain is present at all times  Pain is improved by rest   Pain is aggravated by overhead activity, reaching back, rotation and lifting   Symptoms include clicking, catching, popping and cracking  The patient denies weakness  The patient denies numbness and tingling  The patient has tried rest, ice and NSAIDS            Shoulder Surgical History:  None    Past Medical, Social and Family History:  Past Medical History:   Diagnosis Date    Anemia     iron def    Chronic pain disorder     back    Migraines     Pneumonia 2018    Seasonal allergies     [facility] [address]     Seizures ([facility] [address]     38 years ago     Past Surgical History:   Procedure Laterality Date    [place] EXC TUMOR SOFT TISSUE THIGH/KNEE SUBFASC 5+CM Right 2/3/2020    Procedure: EXCISIONAL BIOPSY SOFT  TISSUE MASS RIGHT KNEE;  Surgeon: Selena Fregoso MD;  Location: [address] [place] [address];  Service: Orthopedics    TUBAL LIGATION       No Known Allergies  Current Outpatient Medications on File Prior to Visit   Medication Sig Dispense Refill    albuterol (PROVENTIL HFA,VENTOLIN HFA) 90 mcg/act inhaler Inhale 2 puffs every 4 (four) hours as needed for wheezing 1 Inhaler 0    diclofenac sodium (VOLTAREN) 1 % Apply 2 g topically 4 (four) times a day 1 Tube 1    ibuprofen (MOTRIN) 800 mg tablet Take 1 tablet (800 mg total) by mouth 3 (three) times a day 21 tablet 0    naproxen (NAPROSYN) 500 mg tablet Take 1 tablet (500 mg total) by mouth every 12 (twelve) hours as needed for mild pain 30 tablet 0    Diclofenac Sodium (VOLTAREN) 1 % Apply 2 g topically 4 (four) times a day (Patient not taking: Reported on 4/12/2021) 100 g 0     No current facility-administered medications on file prior to visit        Social History     Socioeconomic History    Marital status: Single     Spouse name: Not on file    Number of children: Not on file    Years of education: Not on file    Highest education level: Not on file   Occupational History    Not on file   Social Needs    Financial resource strain: Not on file    Food insecurity     Worry: Not on file     Inability: Not on file    Transportation needs     Medical: Not on file     Non-medical: Not on file   Tobacco Use    Smoking status: Current Every Day Smoker     Packs/day: 0 25     Types: Cigarettes    Smokeless tobacco: Never Used   Substance and Sexual Activity    Alcohol use: No    Drug use: No    Sexual activity: Not on file   Lifestyle    Physical activity     Days per week: Not on file     Minutes per session: Not on file    Stress: Not on file   Relationships    Social connections     Talks on phone: Not on file     Gets together: Not on file     Attends Religion service: Not on file     Active member of club or organization: Not on file     Attends meetings of clubs or organizations: Not on file     Relationship status: Not on file    Intimate partner violence     Fear of current or ex partner: Not on file     Emotionally abused: Not on file     Physically abused: Not on file     Forced sexual activity: Not on file   Other Topics Concern    Not on file   Social History Narrative    Not on file         I have reviewed the past medical, surgical, social and family history, medications and allergies as documented in the EMR  Review of systems: ROS is negative other than that noted in the HPI  Constitutional: Negative for fatigue and fever  HENT: Negative for sore throat  Respiratory: Negative for shortness of breath  Cardiovascular: Negative for chest pain  Gastrointestinal: Negative for abdominal pain  Endocrine: Negative for cold intolerance and heat intolerance  Genitourinary: Negative for flank pain  Musculoskeletal: Negative for back pain  Skin: Negative for rash  Allergic/Immunologic: Negative for immunocompromised state  Neurological: Negative for dizziness  Psychiatric/Behavioral: Negative for agitation  Physical Exam:    Blood pressure 109/73, pulse 91, temperature (!) 97 3 °F (36 3 °C), height 5' 11" (1 803 m), weight 81 2 kg (179 lb), not currently breastfeeding      General/Constitutional: NAD, well developed, well nourished  HENT: Normocephalic, atraumatic  CV: Intact distal pulses, regular rate  Resp: No respiratory distress or labored breathing  Lymphatic: No lymphadenopathy palpated  Neuro: Alert and Oriented x 3, no focal deficits  Psych: Normal mood, normal affect, normal judgement, normal behavior  Skin: Warm, dry, no rashes, no erythema      Shoulder focused exam:       RIGHT LEFT    Scapula Atrophy Negative Negative     Winging Negative Negative     Protraction Negative Negative    Rotator cuff SS 5/5 5/5     IS 5/5 5/5     SubS 5/5 5/5    ROM     170     ER0 60 60     ER90 90    90     IR90 T6    T6     IRb T6    T6    TTP: AC Negative Positive     Biceps Negative Negative     Coracoid Negative Negative    Special Tests: O'Briens Negative Positive - for AC joint      Saldaña-shear Negative Negative     Cross body Adduction Negative Negative     Speeds  Negative Negative     Annie's Negative Negative     Whipple Negative Negative       Neer Negative Negative     Gomez Negative Negative    Instability: Apprehension & relocation not tested not tested     Load & shift not tested not tested    Other: Crank Negative Negative             UE NV Exam: +2 Radial pulses bilaterally  Sensation intact to light touch C5-T1 bilaterally, Radial/median/ulnar nerve motor intact      Bilateral elbow, wrist, and and forearm ROM full, painless with passive ROM, no ttp or crepitance throughout extremities below shoulder joint    Cervical ROM is full without pain, numbness or tingling      Shoulder Imaging    X-rays of the left shoulder were reviewed, which demonstrate mild Ac joint OA  I have reviewed the radiology report and agree with their impression        Scribe Attestation    I,:  Trini Patiño am acting as a scribe while in the presence of the attending physician :       I,:  Meagan Antony, DO personally performed the services described in this documentation    as scribed in my presence :

## 2021-06-03 ENCOUNTER — OFFICE VISIT (OUTPATIENT)
Dept: OBGYN CLINIC | Facility: MEDICAL CENTER | Age: 41
End: 2021-06-03
Payer: COMMERCIAL

## 2021-06-03 VITALS
HEART RATE: 83 BPM | HEIGHT: 71 IN | BODY MASS INDEX: 21.84 KG/M2 | WEIGHT: 156 LBS | SYSTOLIC BLOOD PRESSURE: 106 MMHG | DIASTOLIC BLOOD PRESSURE: 68 MMHG

## 2021-06-03 DIAGNOSIS — M25.512 ACUTE PAIN OF LEFT SHOULDER: ICD-10-CM

## 2021-06-03 DIAGNOSIS — M19.019 OSTEOARTHRITIS OF AC (ACROMIOCLAVICULAR) JOINT: Primary | ICD-10-CM

## 2021-06-03 PROCEDURE — 3008F BODY MASS INDEX DOCD: CPT | Performed by: ORTHOPAEDIC SURGERY

## 2021-06-03 PROCEDURE — 4004F PT TOBACCO SCREEN RCVD TLK: CPT | Performed by: ORTHOPAEDIC SURGERY

## 2021-06-03 PROCEDURE — 99213 OFFICE O/P EST LOW 20 MIN: CPT | Performed by: ORTHOPAEDIC SURGERY

## 2021-06-03 NOTE — PROGRESS NOTES
Ortho Sports Medicine Shoulder Follow Up Visit     Assesment:     39 y o  female left shoulder AC joint arthritis  Plan:    Conservative treatment:    Ice to shoulder 1-2 times daily, for 20 minutes at a time  PT for ROM and strengthening to shoulder, rotator cuff, scapular stabilizers  Work note given for no lifting more than 10 lbs  MRI ordered of the left shoulder  Imaging: All imaging from today was reviewed by myself and explained to the patient  We will obtain an MRI of the shoulder to rule out ligamentous abnormalities about the left shoulder  Follow up in 1-2 weeks for MRI review  Will make a definitive treatment plan based on the results of the MRI  Injection:    No Injection planned at this time  Surgery:     No surgery is recommended at this point, continue with conservative treatment plan as noted  Follow up:    Return for after MRI  Chief Complaint   Patient presents with    Left Shoulder - Follow-up         History of Present Illness: The patient is returns for follow up of left shoulder AC joint arthriritis  Since the prior visit, She reports minimal improvement  Patient stated that she has used a topical cream with no relief  She stated that she has pain first thing in the morning  She stated that if she massages the area that will relieve some of her pain  Pain is improved by rest, ice and NSAIDS  Pain is aggravated by overhead activity  Symptoms include clicking, catching, popping and cracking  The patient denies weakness  The patient has tried rest, ice and NSAIDS          Shoulder Surgical History:  None    Past Medical, Social and Family History:  Past Medical History:   Diagnosis Date    Anemia     iron def    Chronic pain disorder     back    Migraines     Pneumonia 2018    Seasonal allergies     [facility] [address]     Seizures ([facility] [address] [facility]     38 years ago     Past Surgical History:   Procedure Laterality Date    [place] EXC TUMOR SOFT TISSUE THIGH/KNEE SUBFASC 5+CM Right 2/3/2020    Procedure: EXCISIONAL BIOPSY SOFT  TISSUE MASS RIGHT KNEE;  Surgeon: Felisa Mejia MD;  Location: [address] [place] [address];  Service: Orthopedics    TUBAL LIGATION       No Known Allergies  Current Outpatient Medications on File Prior to Visit   Medication Sig Dispense Refill    albuterol (PROVENTIL HFA,VENTOLIN HFA) 90 mcg/act inhaler Inhale 2 puffs every 4 (four) hours as needed for wheezing 1 Inhaler 0    diclofenac sodium (VOLTAREN) 1 % Apply 2 g topically 4 (four) times a day 1 Tube 1    ibuprofen (MOTRIN) 800 mg tablet Take 1 tablet (800 mg total) by mouth 3 (three) times a day 21 tablet 0    naproxen (NAPROSYN) 500 mg tablet Take 1 tablet (500 mg total) by mouth every 12 (twelve) hours as needed for mild pain 30 tablet 0    Diclofenac Sodium (VOLTAREN) 1 % Apply 2 g topically 4 (four) times a day (Patient not taking: Reported on 4/12/2021) 100 g 0     No current facility-administered medications on file prior to visit        Social History     Socioeconomic History    Marital status: Single     Spouse name: Not on file    Number of children: Not on file    Years of education: Not on file    Highest education level: Not on file   Occupational History    Not on file   Social Needs    Financial resource strain: Not on file    Food insecurity     Worry: Not on file     Inability: Not on file    Transportation needs     Medical: Not on file     Non-medical: Not on file   Tobacco Use    Smoking status: Current Every Day Smoker     Packs/day: 0 25     Types: Cigarettes    Smokeless tobacco: Never Used   Substance and Sexual Activity    Alcohol use: No    Drug use: No    Sexual activity: Not on file   Lifestyle    Physical activity     Days per week: Not on file     Minutes per session: Not on file    Stress: Not on file   Relationships    Social connections     Talks on phone: Not on file     Gets together: Not on file     Attends Muslim service: Not on file Active member of club or organization: Not on file     Attends meetings of clubs or organizations: Not on file     Relationship status: Not on file    Intimate partner violence     Fear of current or ex partner: Not on file     Emotionally abused: Not on file     Physically abused: Not on file     Forced sexual activity: Not on file   Other Topics Concern    Not on file   Social History Narrative    Not on file       I have reviewed the past medical, surgical, social and family history, medications and allergies as documented in the EMR  Review of systems: ROS is negative other than that noted in the HPI  Constitutional: Negative for fatigue and fever  Physical Exam:    Blood pressure 106/68, pulse 83, height 5' 11" (1 803 m), weight 70 8 kg (156 lb), not currently breastfeeding      General/Constitutional: NAD, well developed, well nourished  HENT: Normocephalic, atraumatic  CV: Intact distal pulses, regular rate  Resp: No respiratory distress or labored breathing  Lymphatic: No lymphadenopathy palpated  Neuro: Alert and Oriented x 3, no focal deficits  Psych: Normal mood, normal affect, normal judgement, normal behavior  Skin: Warm, dry, no rashes, no erythema      Shoulder focused exam:       RIGHT LEFT    Scapula Atrophy Negative Negative     Winging Negative Negative     Protraction Negative Negative    Rotator cuff SS 5/5 5/5     IS 5/5 5/5     SubS 5/5 5/5    ROM     170     ER0 60 60     ER90 90    90     IR90 T6    T6     IRb T6    T6    TTP: AC Negative Positive     Biceps Negative Negative     Coracoid Negative Negative    Special Tests: O'Briens Negative Negative     Saldaña-shear Negative Positive     Cross body Adduction Negative Negative     Speeds  Negative Negative     Annie's Negative Negative     Whipple Negative Negative       Neer Negative Negative     Gomez Negative Negative    Instability: Apprehension & relocation not tested not tested     Load & shift not tested not tested Other: Crank Negative Negative               UE NV Exam: +2 Radial pulses bilaterally  Sensation intact to light touch C5-T1 bilaterally, Radial/median/ulnar nerve motor intact    Cervical ROM is full without pain, numbness or tingling      Shoulder Imaging    No imaging was performed today      Scribe Attestation    I,:  Frankey Holmes am acting as a scribe while in the presence of the attending physician :       I,:  Samy Hicks, DO personally performed the services described in this documentation    as scribed in my presence :

## 2021-06-03 NOTE — LETTER
Marimar 3, 2021     Patient: Kayla Bonner   YOB: 1980   Date of Visit: 6/3/2021       To Whom it May Concern:    Kayla Bonner is under my professional care  She was seen in my office on 6/3/2021  She may not lift more than 10 pounds at this time, limit pushing and pulling motions  If you have any questions or concerns, please don't hesitate to call           Sincerely,          Kraig Miller DO        CC: No Recipients

## 2021-06-13 ENCOUNTER — HOSPITAL ENCOUNTER (EMERGENCY)
Facility: HOSPITAL | Age: 41
Discharge: HOME | End: 2021-06-13
Attending: EMERGENCY MEDICINE
Payer: COMMERCIAL

## 2021-06-13 VITALS
SYSTOLIC BLOOD PRESSURE: 116 MMHG | WEIGHT: 156.53 LBS | BODY MASS INDEX: 21.83 KG/M2 | RESPIRATION RATE: 16 BRPM | TEMPERATURE: 98.2 F | HEART RATE: 86 BPM | OXYGEN SATURATION: 100 % | DIASTOLIC BLOOD PRESSURE: 70 MMHG

## 2021-06-13 DIAGNOSIS — M25.512 LEFT SHOULDER PAIN, UNSPECIFIED CHRONICITY: Primary | ICD-10-CM

## 2021-06-13 PROCEDURE — 99283 EMERGENCY DEPT VISIT LOW MDM: CPT | Performed by: PHYSICIAN ASSISTANT

## 2021-06-13 PROCEDURE — 99283 EMERGENCY DEPT VISIT LOW MDM: CPT

## 2021-06-13 RX ORDER — PREDNISONE 20 MG/1
20 TABLET ORAL 2 TIMES DAILY WITH MEALS
Qty: 10 TABLET | Refills: 0 | Status: SHIPPED | OUTPATIENT
Start: 2021-06-13 | End: 2021-06-18

## 2021-06-13 NOTE — DISCHARGE INSTRUCTIONS
Medication as prescribed  Continue motrin / tylenol for breakthrough pain as needed until appointment for MRI     Follow up with your orthopedist   Return if worse   Passive Range of Motion exercises as we discussed, do not do things that hurt

## 2021-06-13 NOTE — Clinical Note
Darion Seaman was seen and treated in our emergency department on 6/13/2021  Other - See Comments    limit use of upper extremity, do not lift >10lbs    Diagnosis: Shoulder strain    Serenayrislizbet    She may return on this date: 06/14/2021         If you have any questions or concerns, please don't hesitate to call        Buffy Lauren PA-C    ______________________________           _______________          _______________  Hospital Representative                              Date                                Time

## 2021-06-13 NOTE — ED PROVIDER NOTES
History  Chief Complaint   Patient presents with    Shoulder Pain     L shoulder pain  Seen here for same previously  MRI re scheduled for 6/22  Intolerable pain at present  No known injury  Laurey Gowers is a 39 y o  female, here for evaluation of left shoulder pain  Patient says she has been seen multiple times for same and wants to "know whats going on"  Patient is a  and says about a month ago shoulder "felt sore" but pain did not resolve which led her to seek eval in ED  She was referred to orthopedics and has an MRI scheduled for 6/22  It was supposed to be this Wednesday but they called her and rescheduled  She has tried motrin, rest, ice  She was offered cortisone injection but wanted to have MRI first  She denies any new, aggravating factors  Also requesting work note  History provided by:  Patient  Shoulder Pain  Location:  Shoulder  Shoulder location:  L shoulder  Pain details:     Quality:  Aching    Radiates to:  L arm    Severity:  Moderate    Onset quality:  Gradual    Duration:  1 month    Timing:  Constant    Progression:  Waxing and waning  Prior injury to area:  No  Relieved by:  Nothing  Worsened by: Movement and stretching area  Ineffective treatments:  Acetaminophen, NSAIDs and rest  Associated symptoms: decreased range of motion and swelling    Associated symptoms: no back pain and no fever        Prior to Admission Medications   Prescriptions Last Dose Informant Patient Reported? Taking?    Diclofenac Sodium (VOLTAREN) 1 %   No No   Sig: Apply 2 g topically 4 (four) times a day   Patient not taking: Reported on 4/12/2021   albuterol (PROVENTIL HFA,VENTOLIN HFA) 90 mcg/act inhaler   No No   Sig: Inhale 2 puffs every 4 (four) hours as needed for wheezing   diclofenac sodium (VOLTAREN) 1 %   No No   Sig: Apply 2 g topically 4 (four) times a day   ibuprofen (MOTRIN) 800 mg tablet   No No   Sig: Take 1 tablet (800 mg total) by mouth 3 (three) times a day   naproxen (NAPROSYN) 500 mg tablet   No No   Sig: Take 1 tablet (500 mg total) by mouth every 12 (twelve) hours as needed for mild pain      Facility-Administered Medications: None       Past Medical History:   Diagnosis Date    Anemia     iron def    Chronic pain disorder     back    Migraines     Pneumonia 2018    Seasonal allergies     [facility] [address]     Seizures ([facility] [address]     38 years ago       Past Surgical History:   Procedure Laterality Date    [place] EXC TUMOR SOFT TISSUE THIGH/KNEE SUBFASC 5+CM Right 2/3/2020    Procedure: EXCISIONAL BIOPSY SOFT  TISSUE MASS RIGHT KNEE;  Surgeon: Maxime Blevins MD;  Location: [facility] MAIN OR;  Service: Orthopedics    TUBAL LIGATION         Family History   Problem Relation Age of Onset    Heart disease Mother     Hypertension Mother      I have reviewed and agree with the history as documented  E-Cigarette/Vaping    E-Cigarette Use Never User      E-Cigarette/Vaping Substances    Nicotine No     THC No     CBD No     Flavoring No     Other No     Unknown No      Social History     Tobacco Use    Smoking status: Current Every Day Smoker     Packs/day: 0 25     Types: Cigarettes    Smokeless tobacco: Never Used   Vaping Use    Vaping Use: Never used   Substance Use Topics    Alcohol use: No    Drug use: No       Review of Systems   Constitutional: Negative for chills and fever  HENT: Negative for ear pain and sore throat  Eyes: Negative for pain and visual disturbance  Respiratory: Negative for cough and shortness of breath  Cardiovascular: Negative for chest pain and palpitations  Gastrointestinal: Negative for abdominal pain and vomiting  Genitourinary: Negative for dysuria and hematuria  Musculoskeletal: Positive for arthralgias and joint swelling  Negative for back pain  Skin: Negative for color change and rash  Neurological: Negative for seizures and syncope  All other systems reviewed and are negative        Physical Exam  Physical Exam  Vitals and nursing note reviewed  Constitutional:       General: She is not in acute distress  Appearance: She is well-developed  HENT:      Head: Normocephalic and atraumatic  Eyes:      Conjunctiva/sclera: Conjunctivae normal    Cardiovascular:      Rate and Rhythm: Normal rate and regular rhythm  Heart sounds: No murmur heard  Pulmonary:      Effort: Pulmonary effort is normal  No respiratory distress  Breath sounds: Normal breath sounds  Musculoskeletal:         General: Tenderness present  No swelling  Right shoulder: Normal       Left shoulder: Tenderness (diffuse tenderness entire left shoulder) present  No swelling or deformity  Decreased range of motion (secondary to pain)  Cervical back: Neck supple  Comments: No rashes, swelling, or erythema to left shoulder  Skin:     General: Skin is warm and dry  Neurological:      Mental Status: She is alert           Vital Signs  ED Triage Vitals [06/13/21 1134]   Temperature Pulse Respirations Blood Pressure SpO2   98 2 °F (36 8 °C) 86 16 116/70 100 %      Temp Source Heart Rate Source Patient Position - Orthostatic VS BP Location FiO2 (%)   Oral Monitor Sitting Right arm --      Pain Score       9           Vitals:    06/13/21 1134   BP: 116/70   Pulse: 86   Patient Position - Orthostatic VS: Sitting         Visual Acuity      ED Medications  Medications - No data to display    Diagnostic Studies  Results Reviewed     None                 No orders to display              Procedures  Procedures         ED Course                                           MDM    Disposition  Final diagnoses:   Left shoulder pain, unspecified chronicity     Time reflects when diagnosis was documented in both MDM as applicable and the Disposition within this note     Time User Action Codes Description Comment    6/13/2021 12:04 PM Melodie Bailey Add [J07 980] Left shoulder pain, unspecified chronicity       ED Disposition     ED Disposition Condition Date/Time Comment    Discharge Stable Sun Jun 13, 2021 12:07 PM Laurie How discharge to home/self care  Follow-up Information     Follow up With Specialties Details Why Contact Info Additional Information    Kris Saunders MD Family Medicine  As needed [address] [place] [address]  [address]  [facility]        [address] [facility] Emergency Department Emergency Medicine  If symptoms worsen [facility] [place] [id number]  [address] [facility] Emergency Department, [address] [facility] [address]  , [facility], [address] [facility], [address]          Discharge Medication List as of 6/13/2021 12:09 PM      START taking these medications    Details   predniSONE 20 mg tablet Take 1 tablet (20 mg total) by mouth 2 (two) times a day with meals for 5 days, Starting Sun 6/13/2021, Until Fri 6/18/2021, Normal         CONTINUE these medications which have NOT CHANGED    Details   albuterol (PROVENTIL HFA,VENTOLIN HFA) 90 mcg/act inhaler Inhale 2 puffs every 4 (four) hours as needed for wheezing, Starting Thu 3/28/2019, Print      diclofenac sodium (VOLTAREN) 1 % Apply 2 g topically 4 (four) times a day, Starting Tue 6/9/2020, Normal      Diclofenac Sodium (VOLTAREN) 1 % Apply 2 g topically 4 (four) times a day, Starting Wed 4/7/2021, Print      ibuprofen (MOTRIN) 800 mg tablet Take 1 tablet (800 mg total) by mouth 3 (three) times a day, Starting Fri 12/20/2019, Print      naproxen (NAPROSYN) 500 mg tablet Take 1 tablet (500 mg total) by mouth every 12 (twelve) hours as needed for mild pain, Starting Wed 4/7/2021, Print           No discharge procedures on file      PDMP Review       Value Time User    PDMP Reviewed  Yes 2/4/2020 10:31 AM Kota Barrera MD          ED Provider  Electronically Signed by           Bro Lawrence PA-C  06/13/21 [id number]

## 2021-06-22 ENCOUNTER — HOSPITAL ENCOUNTER (OUTPATIENT)
Dept: MRI IMAGING | Facility: HOSPITAL | Age: 41
Discharge: HOME | End: 2021-06-22
Attending: ORTHOPAEDIC SURGERY
Payer: COMMERCIAL

## 2021-06-22 DIAGNOSIS — M19.019 OSTEOARTHRITIS OF AC (ACROMIOCLAVICULAR) JOINT: ICD-10-CM

## 2021-06-22 DIAGNOSIS — M25.512 ACUTE PAIN OF LEFT SHOULDER: ICD-10-CM

## 2021-06-22 PROCEDURE — 73221 MRI JOINT UPR EXTREM W/O DYE: CPT

## 2021-06-22 PROCEDURE — G1004 CDSM NDSC: HCPCS

## 2021-06-24 ENCOUNTER — OFFICE VISIT (OUTPATIENT)
Dept: OBGYN CLINIC | Facility: MEDICAL CENTER | Age: 41
End: 2021-06-24
Payer: COMMERCIAL

## 2021-06-24 VITALS
DIASTOLIC BLOOD PRESSURE: 79 MMHG | HEART RATE: 85 BPM | HEIGHT: 71 IN | BODY MASS INDEX: 21.84 KG/M2 | SYSTOLIC BLOOD PRESSURE: 121 MMHG | WEIGHT: 156 LBS

## 2021-06-24 DIAGNOSIS — M75.42 IMPINGEMENT SYNDROME OF LEFT SHOULDER: Primary | ICD-10-CM

## 2021-06-24 PROCEDURE — 20610 DRAIN/INJ JOINT/BURSA W/O US: CPT | Performed by: ORTHOPAEDIC SURGERY

## 2021-06-24 PROCEDURE — 3008F BODY MASS INDEX DOCD: CPT | Performed by: ORTHOPAEDIC SURGERY

## 2021-06-24 PROCEDURE — 99214 OFFICE O/P EST MOD 30 MIN: CPT | Performed by: ORTHOPAEDIC SURGERY

## 2021-06-24 PROCEDURE — 4004F PT TOBACCO SCREEN RCVD TLK: CPT | Performed by: ORTHOPAEDIC SURGERY

## 2021-06-24 RX ORDER — LIDOCAINE HYDROCHLORIDE 10 MG/ML
3 INJECTION, SOLUTION EPIDURAL; INFILTRATION; INTRACAUDAL; PERINEURAL
Status: COMPLETED | OUTPATIENT
Start: 2021-06-24 | End: 2021-06-24

## 2021-06-24 RX ORDER — METHYLPREDNISOLONE ACETATE 40 MG/ML
1 INJECTION, SUSPENSION INTRA-ARTICULAR; INTRALESIONAL; INTRAMUSCULAR; SOFT TISSUE
Status: COMPLETED | OUTPATIENT
Start: 2021-06-24 | End: 2021-06-24

## 2021-06-24 RX ADMIN — LIDOCAINE HYDROCHLORIDE 3 ML: 10 INJECTION, SOLUTION EPIDURAL; INFILTRATION; INTRACAUDAL; PERINEURAL at 20:50

## 2021-06-24 RX ADMIN — METHYLPREDNISOLONE ACETATE 1 ML: 40 INJECTION, SUSPENSION INTRA-ARTICULAR; INTRALESIONAL; INTRAMUSCULAR; SOFT TISSUE at 20:50

## 2021-06-24 NOTE — LETTER
June 24, 2021     Patient: Antonio Epstein   YOB: 1980   Date of Visit: 6/24/2021       To Whom it May Concern:    Antonio Epstein is under my professional care  She was seen in my office on 6/24/2021  She  Can return to work with full duties  If you have any questions or concerns, please don't hesitate to call           Sincerely,          Donna Cardenas DO        CC: No Recipients

## 2021-06-25 NOTE — PROGRESS NOTES
Ortho Sports Medicine Shoulder Visit     Assesment:   left shoulder impingement    Plan:    Conservative treatment:    Ice to shoulder 1-2 times daily, for 20 minutes at a time  PT for ROM and strengthening to shoulder, rotator cuff, scapular stabilizers  Imaging: All imaging from today was reviewed by myself and explained to the patient  Injection:    The risks and benefits of the injection (which include but are not limited to: infection, bleeding,damage to nerve/artery, need for further intervention), as well as the risks and benefits of all alternative treatments were explained and understood  The patient elected to proceed with injection  The procedure was done with aseptic technique, and the patient tolerated the procedure well with no complications  A corticosteroid injection of the subacromial space was performed  Surgery:     No surgery is recommended at this point, continue with conservative treatment plan as noted  Large joint arthrocentesis: L subacromial bursa  Universal Protocol:  Consent given by: patient  Time out: Immediately prior to procedure a "time out" was called to verify the correct patient, procedure, equipment, support staff and site/side marked as required  Site marked: the operative site was marked  Supporting Documentation  Indications: pain and joint swelling   Procedure Details  Location: shoulder - L subacromial bursa  Preparation: Patient was prepped and draped in the usual sterile fashion  Needle size: 22 G  Ultrasound guidance: no  Approach: posterior  Medications administered: 3 mL lidocaine (PF) 1 %; 1 mL methylPREDNISolone acetate 40 mg/mL    Patient tolerance: patient tolerated the procedure well with no immediate complications  Dressing:  Sterile dressing applied          History of Present Illness: The patient is returns for follow up of  Left shoulder and review of MRI  Since the prior visit, She reports minimal improvement       Pain is improved by rest   Pain is aggravated by overhead activity  Symptoms include catching  The patient denies weakness  The patient has tried rest           I have reviewed the past medical, surgical, social and family history, medications and allergies as documented in the EMR  Review of systems: ROS is negative other than that noted in the HPI  Constitutional: Negative for fatigue and fever  Cardiovascular: Negative for chest pain  Pulmonary: negative for shortness of breath    PMH/PSH:  Past Medical History:   Diagnosis Date    Anemia     iron def    Chronic pain disorder     back    Migraines     Pneumonia 2018    Seasonal allergies     [facility] [address]     Seizures ([facility] [address] [facility]     38 years ago     Past Surgical History:   Procedure Laterality Date    OR EXC TUMOR SOFT TISSUE THIGH/KNEE SUBFASC 5+CM Right 2/3/2020    Procedure: EXCISIONAL BIOPSY SOFT  TISSUE MASS RIGHT KNEE;  Surgeon: Juve Ortiz MD;  Location: [address] [place] [address];  Service: Orthopedics    TUBAL LIGATION          Physical Exam:    Blood pressure 121/79, pulse 85, height 5' 11" (1 803 m), weight 70 8 kg (156 lb), last menstrual period 06/01/2021, not currently breastfeeding  General/Constitutional: NAD, well developed, well nourished  HENT: Normocephalic, atraumatic  CV: Intact distal pulses, regular rate  Resp: No respiratory distress or labored breathing  Lymphatic: No lymphadenopathy palpated  Neuro: Alert and Oriented x 3, no focal deficits  Psych: Normal mood, normal affect, normal judgement, normal behavior  Skin: Warm, dry, no rashes, no erythema     Shoulder Exam (focused):     Shoulder focused exam:       RIGHT LEFT    Scapula Atrophy Negative Negative     Winging Negative Negative     Protraction Negative Negative    Rotator cuff SS 5/5/5 5/5/5     IS 5/5/5 5/5/5     SubS 5/5/5 5/5/5    ROM  170     ER0 60 60     ER90 90 90     IR90 40 40     IRb T6 T6    TTP: AC Negative Negative     Biceps Negative Negative     Coracoid Negative Negative    Special Tests: O'Briens Negative Negative     Saldaña-shear Negative Negative     Cross body Adduction Negative Negative     Speeds  Negative Negative     Annie's Negative Negative     Whipple Negative Negative       Neer Negative Negative     Gomez Negative Negative    Instability: Apprehension & relocation not tested not tested     Load & shift not tested not tested    Other: Crank Negative Negative               UE NV Exam: +2 Radial pulses bilaterally  Sensation intact to light touch C5-T1 bilaterally, Radial/median/ulnar nerve motor intact    Cervical ROM is full without pain, numbness or tingling       Shoulder Imaging    MRI of the left shoulder were reviewed, which demonstrate  Rotator cuff insertional tendinosis and tendinosis the biceps tendon with no labral tear or rotator cuff tear  I have reviewed the radiology report and agree with their impression

## 2021-07-29 ENCOUNTER — OFFICE VISIT (OUTPATIENT)
Dept: OBGYN CLINIC | Facility: MEDICAL CENTER | Age: 41
End: 2021-07-29
Payer: COMMERCIAL

## 2021-07-29 VITALS
WEIGHT: 162 LBS | SYSTOLIC BLOOD PRESSURE: 105 MMHG | HEIGHT: 71 IN | HEART RATE: 72 BPM | DIASTOLIC BLOOD PRESSURE: 72 MMHG | BODY MASS INDEX: 22.68 KG/M2

## 2021-07-29 DIAGNOSIS — M75.22 BICEPS TENDINITIS OF LEFT UPPER EXTREMITY: Primary | ICD-10-CM

## 2021-07-29 DIAGNOSIS — M19.019 OSTEOARTHRITIS OF AC (ACROMIOCLAVICULAR) JOINT: ICD-10-CM

## 2021-07-29 DIAGNOSIS — M25.561 RIGHT KNEE PAIN, UNSPECIFIED CHRONICITY: ICD-10-CM

## 2021-07-29 PROCEDURE — 4004F PT TOBACCO SCREEN RCVD TLK: CPT | Performed by: ORTHOPAEDIC SURGERY

## 2021-07-29 PROCEDURE — 3008F BODY MASS INDEX DOCD: CPT | Performed by: ORTHOPAEDIC SURGERY

## 2021-07-29 PROCEDURE — 99214 OFFICE O/P EST MOD 30 MIN: CPT | Performed by: ORTHOPAEDIC SURGERY

## 2021-07-29 NOTE — LETTER
July 29, 2021     Patient: Joana Miller   YOB: 1980   Date of Visit: 7/29/2021       To Whom it May Concern:    Joana Miller is under my professional care  She was seen in my office on 7/29/2021  She may return to work on [employer] duty  She will be seen back in the office in 8 weeks in which her work status will be re-evaluated  If you have any questions or concerns, please don't hesitate to call           Sincerely,          Dillon Delaney DO        CC: Joana Miller

## 2021-07-29 NOTE — PROGRESS NOTES
Ortho Sports Medicine Shoulder Follow Up Visit     Assesment:   39 y o  female left shoulder impingement, biceps tendonitis, and AC joint OA, without improvement    Plan:    Conservative treatment:    Ice to shoulder 1-2 times daily, for 20 minutes at a time  Home exercise program for shoulder, including ROM and strenthening  Work note written  Sling given-  Instructed that she may use this occasionally but ultimately would now like her to use this often to prevent shoulder stiffness  Voltaren gel was sent to patient's pharmacy    Referral to sports medicine was written to see Dr Chepe Jenkins to receive an ultrasound-guided corticosteroid injection to the biceps tendon and possibly in addition an ultrasound-guided corticosteroid injection into the [facility] joint      Imaging: All imaging from today was reviewed by myself and explained to the patient  Injection:    No Injection planned at this time  Surgery:     No surgery is recommended at this point, continue with conservative treatment plan as noted  Follow up:    Return for Appt with Jase and 6 weeks after that with Margarita  Chief Complaint   Patient presents with    Left Shoulder - Follow-up         History of Present Illness: The patient is returns for follow up of Left shoulder impingement  Since the prior visit, She reports no improvement  She received a corticosteroid injection at her last appointment and states that she had a week of about 50% improvement  She states after the injection wore off she felt like she had increased pain in the shoulder compared to prior to the injection  She states that all of her pain is in the anterior superior aspect of the shoulder and she now is experiencing pain that radiates down to the biceps  She denies having any pain the past the elbow  Patient states that she now has limited range of motion but states this is due to pain    She states that she does get painful popping and clicking  Pain is improved by rest   Pain is aggravated by overhead activity, reaching back and lifting   Symptoms include clicking, catching and popping  The patient has weakness occasionally, maybe related to pain  The patient has tried rest, ice, NSAIDS, physical therapy and injection  Shoulder Surgical History:  None    Past Medical, Social and Family History:  Past Medical History:   Diagnosis Date    Anemia     iron def    Chronic pain disorder     back    Migraines     Pneumonia 2018    Seasonal allergies     [facility] [address]     Seizures ([facility] [address] [facility]     38 years ago     Past Surgical History:   Procedure Laterality Date    [place] EXC TUMOR SOFT TISSUE THIGH/KNEE SUBFASC 5+CM Right 2/3/2020    Procedure: EXCISIONAL BIOPSY SOFT  TISSUE MASS RIGHT KNEE;  Surgeon: Carolina Alejo MD;  Location: [address] [place] [address];  Service: Orthopedics    TUBAL LIGATION       No Known Allergies  Current Outpatient Medications on File Prior to Visit   Medication Sig Dispense Refill    albuterol (PROVENTIL HFA,VENTOLIN HFA) 90 mcg/act inhaler Inhale 2 puffs every 4 (four) hours as needed for wheezing 1 Inhaler 0    diclofenac sodium (VOLTAREN) 1 % Apply 2 g topically 4 (four) times a day 1 Tube 1    Diclofenac Sodium (VOLTAREN) 1 % Apply 2 g topically 4 (four) times a day 100 g 0    ibuprofen (MOTRIN) 800 mg tablet Take 1 tablet (800 mg total) by mouth 3 (three) times a day 21 tablet 0    naproxen (NAPROSYN) 500 mg tablet Take 1 tablet (500 mg total) by mouth every 12 (twelve) hours as needed for mild pain 30 tablet 0     No current facility-administered medications on file prior to visit       Social History     Socioeconomic History    Marital status: Single     Spouse name: Not on file    Number of children: Not on file    Years of education: Not on file    Highest education level: Not on file   Occupational History    Not on file   Tobacco Use    Smoking status: Current Every Day Smoker     Packs/day: 0 25 Types: Cigarettes    Smokeless tobacco: Never Used   Vaping Use    Vaping Use: Never used   Substance and Sexual Activity    Alcohol use: No    Drug use: No    Sexual activity: Not on file   Other Topics Concern    Not on file   Social History Narrative    Not on file     Social Determinants of Health     Financial Resource Strain:     Difficulty of Paying Living Expenses:    Food Insecurity:     Worried About Running Out of Food in the Last Year:     [address] in the Last Year:    Transportation Needs:     Lack of Transportation (Medical):  Lack of Transportation (Non-Medical):    Physical Activity:     Days of Exercise per Week:     Minutes of Exercise per Session:    Stress:     Feeling of Stress :    Social Connections:     Frequency of Communication with Friends and Family:     Frequency of Social Gatherings with Friends and Family:     Attends Mandaeism Services:     Active Member of Clubs or Organizations:     Attends Club or Organization Meetings:     Marital Status:    Intimate Partner Violence:     Fear of Current or Ex-Partner:     Emotionally Abused:     Physically Abused:     Sexually Abused:        I have reviewed the past medical, surgical, social and family history, medications and allergies as documented in the EMR  Review of systems: ROS is negative other than that noted in the HPI  Constitutional: Negative for fatigue and fever  Physical Exam:    Blood pressure 105/72, pulse 72, height 5' 11" (1 803 m), weight 73 5 kg (162 lb), not currently breastfeeding      General/Constitutional: NAD, well developed, well nourished  HENT: Normocephalic, atraumatic  CV: Intact distal pulses, regular rate  Resp: No respiratory distress or labored breathing  Lymphatic: No lymphadenopathy palpated  Neuro: Alert and Oriented x 3, no focal deficits  Psych: Normal mood, normal affect, normal judgement, normal behavior  Skin: Warm, dry, no rashes, no erythema      Shoulder focused exam:       RIGHT LEFT    Scapula Atrophy Negative Negative     Winging Negative Negative     Protraction Negative Negative    Rotator cuff SS 5/5 5/5     IS 5/5 5/5     SubS 5/5 5/5    ROM     170 passive      ER0 60 60 passive                   IRb T6    T6    TTP: AC Negative Positive     Biceps Negative Positive     Coracoid Negative Negative    Special Tests: O'Briens Negative Positive     Saldaña-shear Negative Negative     Cross body Adduction Negative Negative     Speeds  Negative Negative     Annie's Negative Negative     Whipple Negative Negative       Neer Negative Negative     Gomez Negative Negative    Instability: Apprehension & relocation not tested not tested     Load & shift not tested not tested    Other: Crank Negative Negative               UE NV Exam: +2 Radial pulses bilaterally  Sensation intact to light touch C5-T1 bilaterally, Radial/median/ulnar nerve motor intact    Cervical ROM is full without pain, numbness or tingling      Shoulder Imaging    No imaging was performed today

## 2021-08-09 ENCOUNTER — TELEPHONE (OUTPATIENT)
Dept: OBGYN CLINIC | Facility: HOSPITAL | Age: 41
End: 2021-08-09

## 2021-08-09 NOTE — TELEPHONE ENCOUNTER
DR Jose Maciel  RE: letter fax    Light duty Letter faxed per patient request to    [phone] ATTN: Agueda Vicente

## 2021-09-28 ENCOUNTER — OFFICE VISIT (OUTPATIENT)
Dept: OBGYN CLINIC | Facility: OTHER | Age: 41
End: 2021-09-28
Payer: COMMERCIAL

## 2021-09-28 VITALS
DIASTOLIC BLOOD PRESSURE: 66 MMHG | HEART RATE: 68 BPM | SYSTOLIC BLOOD PRESSURE: 96 MMHG | BODY MASS INDEX: 22.04 KG/M2 | WEIGHT: 158 LBS

## 2021-09-28 DIAGNOSIS — M67.814 BICEPS TENDINOSIS OF LEFT SHOULDER: Primary | ICD-10-CM

## 2021-09-28 DIAGNOSIS — M25.512 PAIN IN LEFT ACROMIOCLAVICULAR JOINT: ICD-10-CM

## 2021-09-28 PROCEDURE — 20611 DRAIN/INJ JOINT/BURSA W/US: CPT | Performed by: FAMILY MEDICINE

## 2021-09-28 RX ORDER — LIDOCAINE HYDROCHLORIDE 10 MG/ML
1 INJECTION, SOLUTION INFILTRATION; PERINEURAL
Status: COMPLETED | OUTPATIENT
Start: 2021-09-28 | End: 2021-09-28

## 2021-09-28 RX ORDER — TRIAMCINOLONE ACETONIDE 40 MG/ML
40 INJECTION, SUSPENSION INTRA-ARTICULAR; INTRAMUSCULAR
Status: COMPLETED | OUTPATIENT
Start: 2021-09-28 | End: 2021-09-28

## 2021-09-28 RX ORDER — LIDOCAINE HYDROCHLORIDE 10 MG/ML
2 INJECTION, SOLUTION INFILTRATION; PERINEURAL
Status: COMPLETED | OUTPATIENT
Start: 2021-09-28 | End: 2021-09-28

## 2021-09-28 RX ADMIN — LIDOCAINE HYDROCHLORIDE 1 ML: 10 INJECTION, SOLUTION INFILTRATION; PERINEURAL at 17:57

## 2021-09-28 RX ADMIN — TRIAMCINOLONE ACETONIDE 40 MG: 40 INJECTION, SUSPENSION INTRA-ARTICULAR; INTRAMUSCULAR at 17:57

## 2021-09-28 RX ADMIN — LIDOCAINE HYDROCHLORIDE 2 ML: 10 INJECTION, SOLUTION INFILTRATION; PERINEURAL at 17:57

## 2021-09-28 NOTE — PROGRESS NOTES
1  Biceps tendinosis of left shoulder  Medium joint arthrocentesis   2  Pain in left acromioclavicular joint  Medium joint arthrocentesis: L acromioclavicular     Orders Placed This Encounter   Procedures    Medium joint arthrocentesis    Medium joint arthrocentesis: L acromioclavicular        Imaging Studies (I personally reviewed images in PACS and report):     MRI of left shoulder 06/22/2021:1  Mild supraspinatus and infraspinatus insertional tendinosis without rotator cuff tear  Mild biceps tendinosis without tear  X-ray of left shoulder 4/ 7/21:  No acute osseous abnormality  IMPRESSION:  [facility]  Corticosteroid injection to biceps tendon and AC joint    Repeat X-ray next visit: None    Return for f/u with Dr Dang Solis  Patient Instructions   CORTICOSTEROID INJECTION  What is a corticosteroid? Injuries or disease such as arthritis, bursitis or tendonitis result in inflammation  In turn, this inflammation can cause swelling and pain  A local injection of a corticosteroid is provided to diminish inflammation  By doing so, it will also decrease pain and swelling which is making you uncomfortable  Is this the same thing as a Cortisone Injection? Cortisone® is a brand name of a corticosteroid used commonly in the past   Today I commonly use a more water-soluble corticosteroid named Celestone®  Will the injection hurt? As with any injection, you may feel pain at the time of the injection  Typically, I use a local anesthetic (Jodene Chimera) in addition to the corticosteroid to determine if the injection has been placed in the appropriate location  Hence it is important to monitor your symptoms 4-6 hours after the injection, as the area will be anesthetized (numb) while the local anesthetic is working  Once the local anesthetic wears off, the intensity of pain can be the same as it was prior to the injection, or even worse  This does not mean that the injection is not working    The corticosteroid may take 24-72 hours to begin having a positive effect  If you do experience an increase in pain, the use of an ice pack on the area for 20 minutes at a time should help  It is also helpful to take an oral anti-inflammatory such as Tylenol® or Motrin® if you are able to medically do so  For this reason it is best to avoid activities that put stress on the area the first 24 hours after the injection  How long will pain relief last?  This will vary according to the type and severity of the symptoms being treated and the severity of the condition  Symptom relief may last weeks to months  I typically couple injections with physical therapy so that the underlying problem causing the inflammation may be treated as the pain diminishes  If the combination is not successful, you may be a surgical candidate  I have read bad things about steroids  Will these things happen to me? Corticosteroids, when utilized properly, are safe and effective drugs  When used in a low dose, potential adverse reactions are very rare  Some patients may experience a sensation of flushing for several days  Very rarely, there can be a local reaction which may include increased discomfort for a period of time in the areas that has been injected  A steroid should not be used over and over again  Multiple injections in the same area can produce adverse effects such as tissue atrophy and degeneration of tendon or cartilage  A small percentage of patients (< 0 1%) may develop an infection in the joint after injection  This is a treatable problem, but if neglected, may result in permanent disability  Signs of infection include redness, swelling, discharge, fevers, increasing pain and drainage from the injection site  This represents an emergency and you should contact our office immediately or seek treatment in the ER if after hours  If I have diabetes, will this injection affect me?   If you are diabetic, an injection of a corticosteroid can raise your blood sugar level, requiring more insulin for a brief period of time  This may necessitate careful blood sugar maintenance  If the elevated sugars are not able to be controlled, contact your diabetic doctor for guidance  CHIEF COMPLAINT:      HPI:  Cristiano Peña is a 39 y o  female  who presents for ultrasound-guided corticosteroid injection to biceps tendon and possibly AC joint  She was referred by Dr Richards Freshwater  Patient does not have any acute injury  She is right-hand dominant and works as a   She has been seen several times for shoulder pain with working diagnosis of subacromial impingement initially, she had subacromial steroid injection on 06/24/2021 and had MRI ordered due to persistent pain  MRI indicative of mild supraspinatus and infraspinatus tendinosis without tear and biceps tendinosis  Visit 9/28/2021 :   patient states she is still having pain  She states pain is worse at her [facility] joint region and anterior shoulder  She denies any numbness and weakness going down her hand  Review of Systems   Constitutional: Negative for chills and fever  HENT: Negative for ear pain and sore throat  Eyes: Negative for pain and visual disturbance  Respiratory: Negative for cough and shortness of breath  Cardiovascular: Negative for chest pain and palpitations  Gastrointestinal: Negative for abdominal pain and vomiting  Genitourinary: Negative for dysuria and hematuria  Musculoskeletal: Negative for arthralgias and back pain  Skin: Negative for color change and rash  Neurological: Negative for seizures and syncope  All other systems reviewed and are negative          Following history reviewed and update:    Past Medical History:   Diagnosis Date    Anemia     iron def    Chronic pain disorder     back    Migraines     Pneumonia 2018    Seasonal allergies     [facility] [address]     Seizures ([facility] [address] [facility]     38 years ago     Past Surgical History:   Procedure Laterality Date    [place] EXC TUMOR SOFT TISSUE THIGH/KNEE SUBFASC 5+CM Right 2/3/2020    Procedure: EXCISIONAL BIOPSY SOFT  TISSUE MASS RIGHT KNEE;  Surgeon: Arnaud Leblanc MD;  Location: [facility] MAIN OR;  Service: Orthopedics    TUBAL LIGATION       Social History   Social History     Substance and Sexual Activity   Alcohol Use No     Social History     Substance and Sexual Activity   Drug Use No     Social History     Tobacco Use   Smoking Status Current Every Day Smoker    Packs/day: 0 25    Types: Cigarettes   Smokeless Tobacco Never Used     Family History   Problem Relation Age of Onset    Heart disease Mother     Hypertension Mother      No Known Allergies       Physical Exam  BP 96/66 (BP Location: Right arm, Patient Position: Sitting, Cuff Size: Adult)   Pulse 68   Wt 71 7 kg (158 lb)   BMI 22 04 kg/m²     Constitutional:  see vital signs  Gen: well-developed, normocephalic/atraumatic, well-groomed  Eyes: No inflammation or discharge of conjunctiva or lids; sclera clear   Pharynx: no inflammation, lesion, or mass of lips  Neck: supple, no masses, non-distended  MSK: no inflammation, lesion, mass, or clubbing of nails and digits except for other than mentioned below  SKIN: no visible rashes or skin lesions  Pulmonary/Chest: Effort normal  No respiratory distress     NEURO: cranial nerves grossly intact  PSYCH:  Alert and oriented to person, place, and time; recent and remote memory intact; mood normal, no depression, anxiety, or agitation, judgment and insight good and intact     Ortho Exam    LEFT SHOULDER:  Erythema: no  Swelling: no  Increased Warmth: no    Tenderness:     ROM  Touchdown sign: Decreased  External Rotation:  decreased  Internal Rotation:  decreased    Strength  Abduction: 5/5  ER: 5/5  IR: 5/5    Drop-Arm: negative  Emptycan: negative  Belly Press:   Lift-off Test:    Gomez: reproduce pain  Neer:  Reproduces pain  Cross-Arm:   Speeds:     Internal Impingement:  (crank position pain)    Labral Crank Test :  (abducted 90, axial load, guided IR & Er)    Modified Labral Shift:  (seated, ER, abduction, axial load, guided abd/add)    Prescott's Test:   (FF 90, abd 15, resist thumbs up-, resist thumbs down+)    Apprehension:  Annie's Relocation Maneuver:    RIGHT SHOULDER:  Strength  Abduction: 5/5  ER: 5/5  IR: 5/5    ROM  Touchdown sign: intact    Empty can: negative    Medium joint arthrocentesis  Universal Protocol:  Procedure performed by: (Dr Xochilt Pardees)  Consent: Verbal consent obtained  Risks and benefits: risks, benefits and alternatives were discussed  Consent given by: patient  Patient understanding: patient states understanding of the procedure being performed  Patient consent: the patient's understanding of the procedure matches consent given  Site marked: the operative site was marked  Radiology Images displayed and confirmed  If images not available, report reviewed: imaging studies available  Required items: required blood products, implants, devices, and special equipment available  Patient identity confirmed: verbally with patient    Supporting Documentation  Indications: pain   Procedure Details  Location: shoulder (bicep tendon sheath) -   Needle size: 22 G  Ultrasound guidance: yes  Approach: anterolateral  Medications administered: 2 mL lidocaine 1 %; 40 mg triamcinolone acetonide 40 mg/mL (3 mL of lidocaine for needle anesthesia, 2 mL of lidocaine mixed with 1 mL of Kenalog for corticosteroid injection )    Patient tolerance: patient tolerated the procedure well with no immediate complications  Dressing:  Sterile dressing applied    Medium joint arthrocentesis: L acromioclavicular  Universal Protocol:  Consent: Verbal consent obtained    Consent given by: patient  Patient understanding: patient states understanding of the procedure being performed  Patient consent: the patient's understanding of the procedure matches consent given  Site marked: the operative site was marked  Radiology Images displayed and confirmed   If images not available, report reviewed: imaging studies available  Patient identity confirmed: verbally with patient    Supporting Documentation  Indications: pain   Procedure Details  Location: shoulder - L acromioclavicular  Preparation: Patient was prepped and draped in the usual sterile fashion  Needle size: 22 G  Ultrasound guidance: yes  Medications administered: 1 mL lidocaine 1 %; 40 mg triamcinolone acetonide 40 mg/mL    Patient tolerance: patient tolerated the procedure well with no immediate complications  Dressing:  Sterile dressing applied

## 2021-09-28 NOTE — LETTER
September 28, 2021     Patient: Armen Max   YOB: 1980   Date of Visit: 9/28/2021       To Whom it May Concern:    Armen Max is under my professional care  She was seen in my office on 9/28/2021  She was advised to limit using left shoulder from 09/28/2021 until 09/30/2021  After 09/30/2021 no restrictions  If you have any questions or concerns, please don't hesitate to call           Sincerely,          Lena Dominguez III, DO        CC: Armen Max

## 2021-09-28 NOTE — PATIENT INSTRUCTIONS

## 2021-10-18 ENCOUNTER — HOSPITAL ENCOUNTER (EMERGENCY)
Facility: HOSPITAL | Age: 41
Discharge: HOME | End: 2021-10-18
Attending: EMERGENCY MEDICINE | Admitting: EMERGENCY MEDICINE
Payer: COMMERCIAL

## 2021-10-18 VITALS
TEMPERATURE: 98.1 F | HEIGHT: 71 IN | OXYGEN SATURATION: 99 % | WEIGHT: 161.16 LBS | HEART RATE: 74 BPM | BODY MASS INDEX: 22.56 KG/M2 | SYSTOLIC BLOOD PRESSURE: 134 MMHG | RESPIRATION RATE: 16 BRPM | DIASTOLIC BLOOD PRESSURE: 59 MMHG

## 2021-10-18 DIAGNOSIS — R42 LIGHTHEADED: Primary | ICD-10-CM

## 2021-10-18 DIAGNOSIS — R53.1 WEAKNESS: ICD-10-CM

## 2021-10-18 DIAGNOSIS — N39.0 UTI (URINARY TRACT INFECTION): ICD-10-CM

## 2021-10-18 LAB
ANION GAP SERPL CALCULATED.3IONS-SCNC: 6 MMOL/L (ref 4–13)
BACTERIA UR QL AUTO: ABNORMAL /HPF
BASOPHILS # BLD AUTO: 0.03 THOUSANDS/ΜL (ref 0–0.1)
BASOPHILS NFR BLD AUTO: 0 % (ref 0–1)
BILIRUB UR QL STRIP: NEGATIVE
BUN SERPL-MCNC: 13 MG/DL (ref 5–25)
CALCIUM SERPL-MCNC: 8.9 MG/DL (ref 8.3–10.1)
CHLORIDE SERPL-SCNC: 107 MMOL/L (ref 100–108)
CLARITY UR: CLEAR
CO2 SERPL-SCNC: 28 MMOL/L (ref 21–32)
COLOR UR: YELLOW
CREAT SERPL-MCNC: 0.76 MG/DL (ref 0.6–1.3)
EOSINOPHIL # BLD AUTO: 0.21 THOUSAND/ΜL (ref 0–0.61)
EOSINOPHIL NFR BLD AUTO: 2 % (ref 0–6)
ERYTHROCYTE [DISTWIDTH] IN BLOOD BY AUTOMATED COUNT: 13.8 % (ref 11.6–15.1)
EXT PREG TEST URINE: NEGATIVE
EXT. CONTROL ED NAV: NORMAL
GFR SERPL CREATININE-BSD FRML MDRD: 113 ML/MIN/1.73SQ M
GLUCOSE SERPL-MCNC: 87 MG/DL (ref 65–140)
GLUCOSE UR STRIP-MCNC: NEGATIVE MG/DL
HCT VFR BLD AUTO: 39.9 % (ref 34.8–46.1)
HGB BLD-MCNC: 12.4 G/DL (ref 11.5–15.4)
HGB UR QL STRIP.AUTO: ABNORMAL
IMM GRANULOCYTES # BLD AUTO: 0.03 THOUSAND/UL (ref 0–0.2)
IMM GRANULOCYTES NFR BLD AUTO: 0 % (ref 0–2)
KETONES UR STRIP-MCNC: NEGATIVE MG/DL
LEUKOCYTE ESTERASE UR QL STRIP: ABNORMAL
LYMPHOCYTES # BLD AUTO: 2.29 THOUSANDS/ΜL (ref 0.6–4.47)
LYMPHOCYTES NFR BLD AUTO: 27 % (ref 14–44)
MCH RBC QN AUTO: 25.6 PG (ref 26.8–34.3)
MCHC RBC AUTO-ENTMCNC: 31.1 G/DL (ref 31.4–37.4)
MCV RBC AUTO: 82 FL (ref 82–98)
MONOCYTES # BLD AUTO: 0.55 THOUSAND/ΜL (ref 0.17–1.22)
MONOCYTES NFR BLD AUTO: 6 % (ref 4–12)
NEUTROPHILS # BLD AUTO: 5.51 THOUSANDS/ΜL (ref 1.85–7.62)
NEUTS SEG NFR BLD AUTO: 65 % (ref 43–75)
NITRITE UR QL STRIP: NEGATIVE
NON-SQ EPI CELLS URNS QL MICRO: ABNORMAL /HPF
NRBC BLD AUTO-RTO: 0 /100 WBCS
OTHER STN SPEC: ABNORMAL
PH UR STRIP.AUTO: 7 [PH] (ref 4.5–8)
PLATELET # BLD AUTO: 192 THOUSANDS/UL (ref 149–390)
PMV BLD AUTO: 9.8 FL (ref 8.9–12.7)
POTASSIUM SERPL-SCNC: 3.5 MMOL/L (ref 3.5–5.3)
PROT UR STRIP-MCNC: NEGATIVE MG/DL
RBC # BLD AUTO: 4.84 MILLION/UL (ref 3.81–5.12)
RBC #/AREA URNS AUTO: ABNORMAL /HPF
SODIUM SERPL-SCNC: 141 MMOL/L (ref 136–145)
SP GR UR STRIP.AUTO: 1.02 (ref 1–1.03)
UROBILINOGEN UR QL STRIP.AUTO: 1 E.U./DL
WBC # BLD AUTO: 8.62 THOUSAND/UL (ref 4.31–10.16)
WBC #/AREA URNS AUTO: ABNORMAL /HPF

## 2021-10-18 PROCEDURE — 36415 COLL VENOUS BLD VENIPUNCTURE: CPT | Performed by: EMERGENCY MEDICINE

## 2021-10-18 PROCEDURE — 99284 EMERGENCY DEPT VISIT MOD MDM: CPT | Performed by: EMERGENCY MEDICINE

## 2021-10-18 PROCEDURE — 81001 URINALYSIS AUTO W/SCOPE: CPT

## 2021-10-18 PROCEDURE — 87086 URINE CULTURE/COLONY COUNT: CPT

## 2021-10-18 PROCEDURE — 85025 COMPLETE CBC W/AUTO DIFF WBC: CPT | Performed by: EMERGENCY MEDICINE

## 2021-10-18 PROCEDURE — 81025 URINE PREGNANCY TEST: CPT | Performed by: EMERGENCY MEDICINE

## 2021-10-18 PROCEDURE — 99283 EMERGENCY DEPT VISIT LOW MDM: CPT

## 2021-10-18 PROCEDURE — 80048 BASIC METABOLIC PNL TOTAL CA: CPT | Performed by: EMERGENCY MEDICINE

## 2021-10-18 RX ORDER — SULFAMETHOXAZOLE AND TRIMETHOPRIM 800; 160 MG/1; MG/1
1 TABLET ORAL EVERY 12 HOURS SCHEDULED
Qty: 14 TABLET | Refills: 0 | Status: SHIPPED | OUTPATIENT
Start: 2021-10-18 | End: 2021-10-25

## 2021-10-19 LAB — BACTERIA UR CULT: NORMAL

## 2021-11-11 ENCOUNTER — HOSPITAL ENCOUNTER (EMERGENCY)
Facility: HOSPITAL | Age: 41
Discharge: HOME | End: 2021-11-11
Attending: EMERGENCY MEDICINE | Admitting: EMERGENCY MEDICINE
Payer: COMMERCIAL

## 2021-11-11 VITALS
RESPIRATION RATE: 16 BRPM | HEART RATE: 82 BPM | SYSTOLIC BLOOD PRESSURE: 116 MMHG | DIASTOLIC BLOOD PRESSURE: 66 MMHG | OXYGEN SATURATION: 99 % | BODY MASS INDEX: 21.73 KG/M2 | TEMPERATURE: 99.1 F | WEIGHT: 155.8 LBS

## 2021-11-11 DIAGNOSIS — G43.909 MIGRAINE: Primary | ICD-10-CM

## 2021-11-11 LAB
ALBUMIN SERPL BCP-MCNC: 4.1 G/DL (ref 3–5.2)
ALP SERPL-CCNC: 62 U/L (ref 43–122)
ALT SERPL W P-5'-P-CCNC: 21 U/L
ANION GAP SERPL CALCULATED.3IONS-SCNC: 3 MMOL/L (ref 5–14)
AST SERPL W P-5'-P-CCNC: 33 U/L (ref 14–36)
BASOPHILS # BLD AUTO: 0 THOUSANDS/ΜL (ref 0–0.1)
BASOPHILS NFR BLD AUTO: 1 % (ref 0–1)
BILIRUB SERPL-MCNC: 0.59 MG/DL
BUN SERPL-MCNC: 11 MG/DL (ref 5–25)
CALCIUM SERPL-MCNC: 9.5 MG/DL (ref 8.4–10.2)
CHLORIDE SERPL-SCNC: 106 MMOL/L (ref 97–108)
CO2 SERPL-SCNC: 28 MMOL/L (ref 22–30)
CREAT SERPL-MCNC: 0.63 MG/DL (ref 0.6–1.2)
EOSINOPHIL # BLD AUTO: 0.1 THOUSAND/ΜL (ref 0–0.4)
EOSINOPHIL NFR BLD AUTO: 3 % (ref 0–6)
ERYTHROCYTE [DISTWIDTH] IN BLOOD BY AUTOMATED COUNT: 14.5 %
EXT PREG TEST URINE: NEGATIVE
EXT. CONTROL ED NAV: NORMAL
GFR SERPL CREATININE-BSD FRML MDRD: 129 ML/MIN/1.73SQ M
GLUCOSE SERPL-MCNC: 107 MG/DL (ref 70–99)
HCT VFR BLD AUTO: 41.9 % (ref 36–46)
HGB BLD-MCNC: 13.4 G/DL (ref 12–16)
LYMPHOCYTES # BLD AUTO: 1.6 THOUSANDS/ΜL (ref 0.5–4)
LYMPHOCYTES NFR BLD AUTO: 33 % (ref 25–45)
MCH RBC QN AUTO: 25.7 PG (ref 26–34)
MCHC RBC AUTO-ENTMCNC: 31.9 G/DL (ref 31–36)
MCV RBC AUTO: 80 FL (ref 80–100)
MONOCYTES # BLD AUTO: 0.4 THOUSAND/ΜL (ref 0.2–0.9)
MONOCYTES NFR BLD AUTO: 9 % (ref 1–10)
NEUTROPHILS # BLD AUTO: 2.7 THOUSANDS/ΜL (ref 1.8–7.8)
NEUTS SEG NFR BLD AUTO: 55 % (ref 45–65)
PLATELET # BLD AUTO: 226 THOUSANDS/UL (ref 150–450)
PMV BLD AUTO: 8.6 FL (ref 8.9–12.7)
POTASSIUM SERPL-SCNC: 3.7 MMOL/L (ref 3.6–5)
PROT SERPL-MCNC: 8.2 G/DL (ref 5.9–8.4)
RBC # BLD AUTO: 5.22 MILLION/UL (ref 4–5.2)
SODIUM SERPL-SCNC: 137 MMOL/L (ref 137–147)
WBC # BLD AUTO: 4.9 THOUSAND/UL (ref 4.5–11)

## 2021-11-11 PROCEDURE — 85025 COMPLETE CBC W/AUTO DIFF WBC: CPT

## 2021-11-11 PROCEDURE — 36415 COLL VENOUS BLD VENIPUNCTURE: CPT

## 2021-11-11 PROCEDURE — 80053 COMPREHEN METABOLIC PANEL: CPT

## 2021-11-11 PROCEDURE — 96361 HYDRATE IV INFUSION ADD-ON: CPT

## 2021-11-11 PROCEDURE — 99284 EMERGENCY DEPT VISIT MOD MDM: CPT

## 2021-11-11 PROCEDURE — 99283 EMERGENCY DEPT VISIT LOW MDM: CPT

## 2021-11-11 PROCEDURE — 81025 URINE PREGNANCY TEST: CPT

## 2021-11-11 PROCEDURE — 96365 THER/PROPH/DIAG IV INF INIT: CPT

## 2021-11-11 PROCEDURE — 96375 TX/PRO/DX INJ NEW DRUG ADDON: CPT

## 2021-11-11 RX ORDER — MAGNESIUM SULFATE HEPTAHYDRATE 40 MG/ML
2 INJECTION, SOLUTION INTRAVENOUS ONCE
Status: COMPLETED | OUTPATIENT
Start: 2021-11-11 | End: 2021-11-11

## 2021-11-11 RX ORDER — KETOROLAC TROMETHAMINE 30 MG/ML
30 INJECTION, SOLUTION INTRAMUSCULAR; INTRAVENOUS ONCE
Status: COMPLETED | OUTPATIENT
Start: 2021-11-11 | End: 2021-11-11

## 2021-11-11 RX ORDER — DIPHENHYDRAMINE HYDROCHLORIDE 50 MG/ML
25 INJECTION INTRAMUSCULAR; INTRAVENOUS ONCE
Status: COMPLETED | OUTPATIENT
Start: 2021-11-11 | End: 2021-11-11

## 2021-11-11 RX ORDER — METOCLOPRAMIDE HYDROCHLORIDE 5 MG/ML
10 INJECTION INTRAMUSCULAR; INTRAVENOUS ONCE
Status: COMPLETED | OUTPATIENT
Start: 2021-11-11 | End: 2021-11-11

## 2021-11-11 RX ADMIN — MAGNESIUM SULFATE IN WATER 2 G: 40 INJECTION, SOLUTION INTRAVENOUS at 17:37

## 2021-11-11 RX ADMIN — KETOROLAC TROMETHAMINE 30 MG: 30 INJECTION, SOLUTION INTRAMUSCULAR; INTRAVENOUS at 17:31

## 2021-11-11 RX ADMIN — SODIUM CHLORIDE 1000 ML: 0.9 INJECTION, SOLUTION INTRAVENOUS at 17:34

## 2021-11-11 RX ADMIN — METOCLOPRAMIDE 10 MG: 5 INJECTION, SOLUTION INTRAMUSCULAR; INTRAVENOUS at 17:32

## 2021-11-11 RX ADMIN — DIPHENHYDRAMINE HYDROCHLORIDE 25 MG: 50 INJECTION, SOLUTION INTRAMUSCULAR; INTRAVENOUS at 17:30

## 2021-11-18 ENCOUNTER — OFFICE VISIT (OUTPATIENT)
Dept: OBGYN CLINIC | Facility: MEDICAL CENTER | Age: 41
End: 2021-11-18
Payer: COMMERCIAL

## 2021-11-18 VITALS
BODY MASS INDEX: 21.7 KG/M2 | WEIGHT: 155 LBS | DIASTOLIC BLOOD PRESSURE: 71 MMHG | HEIGHT: 71 IN | HEART RATE: 108 BPM | SYSTOLIC BLOOD PRESSURE: 117 MMHG

## 2021-11-18 DIAGNOSIS — M19.019 OSTEOARTHRITIS OF AC (ACROMIOCLAVICULAR) JOINT: ICD-10-CM

## 2021-11-18 DIAGNOSIS — M67.814 BICEPS TENDINOSIS OF LEFT SHOULDER: ICD-10-CM

## 2021-11-18 DIAGNOSIS — M75.22 BICEPS TENDINITIS OF LEFT UPPER EXTREMITY: ICD-10-CM

## 2021-11-18 DIAGNOSIS — M75.42 IMPINGEMENT SYNDROME OF LEFT SHOULDER: Primary | ICD-10-CM

## 2021-11-18 PROCEDURE — 99213 OFFICE O/P EST LOW 20 MIN: CPT | Performed by: ORTHOPAEDIC SURGERY

## 2021-11-18 PROCEDURE — 4004F PT TOBACCO SCREEN RCVD TLK: CPT | Performed by: ORTHOPAEDIC SURGERY

## 2021-11-18 PROCEDURE — 3008F BODY MASS INDEX DOCD: CPT | Performed by: ORTHOPAEDIC SURGERY

## 2021-11-18 RX ORDER — NAPROXEN 500 MG/1
500 TABLET ORAL 2 TIMES DAILY WITH MEALS
Qty: 20 TABLET | Refills: 0 | Status: SHIPPED | OUTPATIENT
Start: 2021-11-18 | End: 2022-02-08 | Stop reason: HOSPADM

## 2021-12-20 LAB
ALBUMIN SERPL BCP-MCNC: 3.6 G/DL (ref 3.5–5)
ALP SERPL-CCNC: 63 U/L (ref 46–116)
ALT SERPL W P-5'-P-CCNC: 17 U/L (ref 12–78)
ANION GAP SERPL CALCULATED.3IONS-SCNC: 6 MMOL/L (ref 4–13)
AST SERPL W P-5'-P-CCNC: 12 U/L (ref 5–45)
BASOPHILS # BLD AUTO: 0.02 THOUSANDS/ΜL (ref 0–0.1)
BASOPHILS NFR BLD AUTO: 0 % (ref 0–1)
BILIRUB SERPL-MCNC: 0.19 MG/DL (ref 0.2–1)
BUN SERPL-MCNC: 9 MG/DL (ref 5–25)
CALCIUM SERPL-MCNC: 8.6 MG/DL (ref 8.3–10.1)
CHLORIDE SERPL-SCNC: 107 MMOL/L (ref 100–108)
CO2 SERPL-SCNC: 27 MMOL/L (ref 21–32)
CREAT SERPL-MCNC: 0.87 MG/DL (ref 0.6–1.3)
EOSINOPHIL # BLD AUTO: 0.17 THOUSAND/ΜL (ref 0–0.61)
EOSINOPHIL NFR BLD AUTO: 3 % (ref 0–6)
ERYTHROCYTE [DISTWIDTH] IN BLOOD BY AUTOMATED COUNT: 13.5 % (ref 11.6–15.1)
GFR SERPL CREATININE-BSD FRML MDRD: 83 ML/MIN/1.73SQ M
GLUCOSE SERPL-MCNC: 102 MG/DL (ref 65–140)
HCT VFR BLD AUTO: 42.4 % (ref 34.8–46.1)
HGB BLD-MCNC: 13.1 G/DL (ref 11.5–15.4)
IMM GRANULOCYTES # BLD AUTO: 0.02 THOUSAND/UL (ref 0–0.2)
IMM GRANULOCYTES NFR BLD AUTO: 0 % (ref 0–2)
LIPASE SERPL-CCNC: 311 U/L (ref 73–393)
LYMPHOCYTES # BLD AUTO: 2.17 THOUSANDS/ΜL (ref 0.6–4.47)
LYMPHOCYTES NFR BLD AUTO: 35 % (ref 14–44)
MCH RBC QN AUTO: 25.8 PG (ref 26.8–34.3)
MCHC RBC AUTO-ENTMCNC: 30.9 G/DL (ref 31.4–37.4)
MCV RBC AUTO: 84 FL (ref 82–98)
MONOCYTES # BLD AUTO: 0.36 THOUSAND/ΜL (ref 0.17–1.22)
MONOCYTES NFR BLD AUTO: 6 % (ref 4–12)
NEUTROPHILS # BLD AUTO: 3.5 THOUSANDS/ΜL (ref 1.85–7.62)
NEUTS SEG NFR BLD AUTO: 56 % (ref 43–75)
NRBC BLD AUTO-RTO: 0 /100 WBCS
PLATELET # BLD AUTO: 208 THOUSANDS/UL (ref 149–390)
PMV BLD AUTO: 10.5 FL (ref 8.9–12.7)
POTASSIUM SERPL-SCNC: 3.5 MMOL/L (ref 3.5–5.3)
PROT SERPL-MCNC: 7.1 G/DL (ref 6.4–8.2)
RBC # BLD AUTO: 5.08 MILLION/UL (ref 3.81–5.12)
SODIUM SERPL-SCNC: 140 MMOL/L (ref 136–145)
WBC # BLD AUTO: 6.24 THOUSAND/UL (ref 4.31–10.16)

## 2021-12-20 PROCEDURE — 81025 URINE PREGNANCY TEST: CPT | Performed by: EMERGENCY MEDICINE

## 2021-12-20 PROCEDURE — 99284 EMERGENCY DEPT VISIT MOD MDM: CPT

## 2021-12-20 PROCEDURE — 36415 COLL VENOUS BLD VENIPUNCTURE: CPT

## 2021-12-20 PROCEDURE — 85025 COMPLETE CBC W/AUTO DIFF WBC: CPT | Performed by: EMERGENCY MEDICINE

## 2021-12-20 PROCEDURE — 83690 ASSAY OF LIPASE: CPT | Performed by: EMERGENCY MEDICINE

## 2021-12-20 PROCEDURE — 80053 COMPREHEN METABOLIC PANEL: CPT | Performed by: EMERGENCY MEDICINE

## 2021-12-20 RX ORDER — ONDANSETRON 4 MG/1
4 TABLET, ORALLY DISINTEGRATING ORAL ONCE
Status: COMPLETED | OUTPATIENT
Start: 2021-12-20 | End: 2021-12-20

## 2021-12-20 RX ADMIN — ONDANSETRON 4 MG: 4 TABLET, ORALLY DISINTEGRATING ORAL at 22:33

## 2021-12-21 ENCOUNTER — APPOINTMENT (EMERGENCY)
Dept: CT IMAGING | Facility: HOSPITAL | Age: 41
End: 2021-12-21
Payer: COMMERCIAL

## 2021-12-21 ENCOUNTER — HOSPITAL ENCOUNTER (EMERGENCY)
Facility: HOSPITAL | Age: 41
Discharge: HOME | End: 2021-12-21
Attending: EMERGENCY MEDICINE | Admitting: EMERGENCY MEDICINE
Payer: COMMERCIAL

## 2021-12-21 VITALS
WEIGHT: 155.42 LBS | HEART RATE: 62 BPM | TEMPERATURE: 97.9 F | RESPIRATION RATE: 18 BRPM | HEIGHT: 71 IN | OXYGEN SATURATION: 100 % | BODY MASS INDEX: 21.76 KG/M2 | SYSTOLIC BLOOD PRESSURE: 131 MMHG | DIASTOLIC BLOOD PRESSURE: 61 MMHG

## 2021-12-21 DIAGNOSIS — R11.2 NAUSEA AND VOMITING: ICD-10-CM

## 2021-12-21 DIAGNOSIS — N39.0 UTI (URINARY TRACT INFECTION): ICD-10-CM

## 2021-12-21 DIAGNOSIS — R10.9 ABDOMINAL PAIN: Primary | ICD-10-CM

## 2021-12-21 LAB
BACTERIA UR QL AUTO: ABNORMAL /HPF
BILIRUB UR QL STRIP: NEGATIVE
CLARITY UR: CLEAR
COLOR UR: YELLOW
EXT BLOOD URINE: NORMAL
EXT PREG TEST URINE: NEGATIVE
EXT. CONTROL ED NAV: NORMAL
GLUCOSE UR STRIP-MCNC: NEGATIVE MG/DL
HGB UR QL STRIP.AUTO: ABNORMAL
KETONES UR STRIP-MCNC: NEGATIVE MG/DL
LEUKOCYTE ESTERASE UR QL STRIP: ABNORMAL
MUCOUS THREADS UR QL AUTO: ABNORMAL
NITRITE UR QL STRIP: NEGATIVE
NON-SQ EPI CELLS URNS QL MICRO: ABNORMAL /HPF
OTHER STN SPEC: ABNORMAL
PH UR STRIP.AUTO: 7 [PH] (ref 4.5–8)
PROT UR STRIP-MCNC: ABNORMAL MG/DL
RBC #/AREA URNS AUTO: ABNORMAL /HPF
SP GR UR STRIP.AUTO: >=1.03 (ref 1–1.03)
UROBILINOGEN UR QL STRIP.AUTO: 2 E.U./DL
WBC # BLD EST: NORMAL 10*3/UL
WBC #/AREA URNS AUTO: ABNORMAL /HPF

## 2021-12-21 PROCEDURE — 99283 EMERGENCY DEPT VISIT LOW MDM: CPT | Performed by: EMERGENCY MEDICINE

## 2021-12-21 PROCEDURE — 96361 HYDRATE IV INFUSION ADD-ON: CPT

## 2021-12-21 PROCEDURE — 74177 CT ABD & PELVIS W/CONTRAST: CPT

## 2021-12-21 PROCEDURE — 96375 TX/PRO/DX INJ NEW DRUG ADDON: CPT

## 2021-12-21 PROCEDURE — 81001 URINALYSIS AUTO W/SCOPE: CPT

## 2021-12-21 PROCEDURE — 96374 THER/PROPH/DIAG INJ IV PUSH: CPT

## 2021-12-21 RX ORDER — DICYCLOMINE HCL 20 MG
20 TABLET ORAL 2 TIMES DAILY
Qty: 20 TABLET | Refills: 0 | Status: SHIPPED | OUTPATIENT
Start: 2021-12-21 | End: 2022-02-08 | Stop reason: HOSPADM

## 2021-12-21 RX ORDER — ONDANSETRON 2 MG/ML
4 INJECTION INTRAMUSCULAR; INTRAVENOUS ONCE
Status: COMPLETED | OUTPATIENT
Start: 2021-12-21 | End: 2021-12-21

## 2021-12-21 RX ORDER — CEPHALEXIN 250 MG/1
500 CAPSULE ORAL ONCE
Status: COMPLETED | OUTPATIENT
Start: 2021-12-21 | End: 2021-12-21

## 2021-12-21 RX ORDER — ONDANSETRON 4 MG/1
4 TABLET, ORALLY DISINTEGRATING ORAL EVERY 6 HOURS PRN
Qty: 20 TABLET | Refills: 0 | Status: SHIPPED | OUTPATIENT
Start: 2021-12-21 | End: 2022-02-08 | Stop reason: HOSPADM

## 2021-12-21 RX ORDER — CEPHALEXIN 500 MG/1
500 CAPSULE ORAL 3 TIMES DAILY
Qty: 15 CAPSULE | Refills: 0 | Status: SHIPPED | OUTPATIENT
Start: 2021-12-21 | End: 2021-12-26

## 2021-12-21 RX ADMIN — CEPHALEXIN 500 MG: 250 CAPSULE ORAL at 04:02

## 2021-12-21 RX ADMIN — IOHEXOL 100 ML: 350 INJECTION, SOLUTION INTRAVENOUS at 02:53

## 2021-12-21 RX ADMIN — SODIUM CHLORIDE 1000 ML: 0.9 INJECTION, SOLUTION INTRAVENOUS at 00:45

## 2021-12-21 RX ADMIN — FAMOTIDINE 20 MG: 10 INJECTION INTRAVENOUS at 00:49

## 2021-12-21 RX ADMIN — ONDANSETRON 4 MG: 2 INJECTION INTRAMUSCULAR; INTRAVENOUS at 00:48

## 2021-12-23 ENCOUNTER — OFFICE VISIT (OUTPATIENT)
Dept: FAMILY MEDICINE CLINIC | Facility: CLINIC | Age: 41
End: 2021-12-23

## 2021-12-23 VITALS
HEART RATE: 94 BPM | RESPIRATION RATE: 18 BRPM | DIASTOLIC BLOOD PRESSURE: 74 MMHG | SYSTOLIC BLOOD PRESSURE: 118 MMHG | WEIGHT: 154 LBS | BODY MASS INDEX: 21.56 KG/M2 | HEIGHT: 71 IN | OXYGEN SATURATION: 99 % | TEMPERATURE: 97.9 F

## 2021-12-23 DIAGNOSIS — G43.109 MIGRAINE WITH AURA AND WITHOUT STATUS MIGRAINOSUS, NOT INTRACTABLE: Primary | ICD-10-CM

## 2021-12-23 PROCEDURE — 99213 OFFICE O/P EST LOW 20 MIN: CPT | Performed by: FAMILY MEDICINE

## 2021-12-30 ENCOUNTER — OFFICE VISIT (OUTPATIENT)
Dept: OBGYN CLINIC | Facility: MEDICAL CENTER | Age: 41
End: 2021-12-30
Payer: COMMERCIAL

## 2021-12-30 VITALS
SYSTOLIC BLOOD PRESSURE: 104 MMHG | BODY MASS INDEX: 21.28 KG/M2 | WEIGHT: 152 LBS | DIASTOLIC BLOOD PRESSURE: 75 MMHG | HEIGHT: 71 IN

## 2021-12-30 DIAGNOSIS — M67.814 BICEPS TENDINOSIS OF LEFT SHOULDER: Primary | ICD-10-CM

## 2021-12-30 PROCEDURE — 99213 OFFICE O/P EST LOW 20 MIN: CPT | Performed by: ORTHOPAEDIC SURGERY

## 2021-12-30 PROCEDURE — 3008F BODY MASS INDEX DOCD: CPT | Performed by: ORTHOPAEDIC SURGERY

## 2021-12-30 PROCEDURE — 4004F PT TOBACCO SCREEN RCVD TLK: CPT | Performed by: ORTHOPAEDIC SURGERY

## 2022-01-03 ENCOUNTER — TELEPHONE (OUTPATIENT)
Dept: OBGYN CLINIC | Facility: HOSPITAL | Age: 42
End: 2022-01-03

## 2022-01-04 NOTE — TELEPHONE ENCOUNTER
Called patient at # on file  No answer  LVM  Called to schedule [facility] Injection of Left Shoulder biceps tendon

## 2022-01-11 ENCOUNTER — OFFICE VISIT (OUTPATIENT)
Dept: FAMILY MEDICINE CLINIC | Facility: CLINIC | Age: 42
End: 2022-01-11

## 2022-01-11 DIAGNOSIS — J06.9 VIRAL UPPER RESPIRATORY TRACT INFECTION: Primary | ICD-10-CM

## 2022-01-11 PROCEDURE — G0071 COMM SVCS BY RHC/FQHC 5 MIN: HCPCS | Performed by: FAMILY MEDICINE

## 2022-01-11 RX ORDER — BENZONATATE 100 MG/1
100 CAPSULE ORAL 3 TIMES DAILY PRN
Qty: 20 CAPSULE | Refills: 0 | Status: SHIPPED | OUTPATIENT
Start: 2022-01-11 | End: 2022-02-08 | Stop reason: HOSPADM

## 2022-01-11 RX ORDER — BROMPHENIRAMINE MALEATE, PSEUDOEPHEDRINE HYDROCHLORIDE, AND DEXTROMETHORPHAN HYDROBROMIDE 2; 30; 10 MG/5ML; MG/5ML; MG/5ML
10 SYRUP ORAL 4 TIMES DAILY PRN
Qty: 120 ML | Refills: 0 | Status: SHIPPED | OUTPATIENT
Start: 2022-01-11 | End: 2022-02-08 | Stop reason: HOSPADM

## 2022-01-11 NOTE — PROGRESS NOTES
COVID-19 Outpatient Progress Note    Assessment/Plan:    Problem List Items Addressed This Visit     None      Visit Diagnoses     Viral upper respiratory tract infection    -  Primary    Relevant Medications    brompheniramine-pseudoephedrine-DM 30-2-10 MG/5ML syrup    benzonatate (TESSALON PERLES) 100 mg capsule    Other Relevant Orders    COVID Only - Office Collect         Disposition:     Recommended patient to come to the office to test for COVID-19  I have spent 8 minutes directly with the patient  Encounter provider Gerber Hill MD    Provider located at [address] [address] [phone]    Recent Visits  No visits were found meeting these conditions  Showing recent visits within past 7 days and meeting all other requirements  Today's Visits  Date Type Provider Dept   01/11/22 Office Visit Gerber Hill MD  [facility]   Showing today's visits and meeting all other requirements  Future Appointments  No visits were found meeting these conditions  Showing future appointments within next 150 days and meeting all other requirements     This virtual check-in was done via telephone and she agrees to proceed  Patient agrees to participate in a virtual check in via telephone or video visit instead of presenting to the office to address urgent/immediate medical needs  Patient is aware this is a billable service  After connecting through Telephone, the patient was identified by name and date of birth  Marikay Gilford was informed that this was a telemedicine visit and that the exam was being conducted confidentially over secure lines  My office door was closed  No one else was in the room  Marikay Gilford acknowledged consent and understanding of privacy and security of the telemedicine visit   I informed the patient that I have reviewed her record in Epic and presented the opportunity for her to ask any questions regarding the visit today  The patient agreed to participate  Verification of patient location:  Patient is located in the following state in which I hold an active license: [place]    Subjective:   Soila Murray is a 39 y o  female who is concerned about COVID-19  Patient's symptoms include chills, fatigue, nasal congestion, rhinorrhea, sore throat, cough and myalgias  Patient denies fever, anosmia, loss of taste and shortness of breath       - Date of symptom onset: 1/4/2022      COVID-19 vaccination status: Not vaccinated    Exposure:   Contact with a person who is under investigation (PUI) for or who is positive for COVID-19 within the last 14 days?: No    Hospitalized recently for fever and/or lower respiratory symptoms?: No      Currently a healthcare worker that is involved in direct patient care?: No      Works in a special setting where the risk of COVID-19 transmission may be high? (this may include long-term care, correctional and MCC facilities; homeless shelters; assisted-living facilities and group homes ): No      Resident in a special setting where the risk of COVID-19 transmission may be high? (this may include long-term care, correctional and MCC facilities; homeless shelters; assisted-living facilities and group homes ): No      Coughing and producing phlegm    Lab Results   Component Value Date    [facility] Not Detected 01/15/2021     Past Medical History:   Diagnosis Date    Anemia     iron def    Chronic pain disorder     back    Migraines     Pneumonia 2018    Seasonal allergies     [facility] [address] [facility]     Seizures ([facility] [address] [facility]     38 years ago     Past Surgical History:   Procedure Laterality Date    ID EXC TUMOR SOFT TISSUE THIGH/KNEE SUBFASC 5+CM Right 2/3/2020    Procedure: EXCISIONAL BIOPSY SOFT  TISSUE MASS RIGHT KNEE;  Surgeon: Claudio Torres MD;  Location: [facility] MAIN OR;  Service: Orthopedics    TUBAL LIGATION       Current Outpatient Medications   Medication Sig Dispense Refill    albuterol (PROVENTIL HFA,VENTOLIN HFA) 90 mcg/act inhaler Inhale 2 puffs every 4 (four) hours as needed for wheezing (Patient not taking: Reported on 9/28/2021) 1 Inhaler 0    benzonatate (TESSALON PERLES) 100 mg capsule Take 1 capsule (100 mg total) by mouth 3 (three) times a day as needed for cough 20 capsule 0    brompheniramine-pseudoephedrine-DM 30-2-10 MG/5ML syrup Take 10 mL by mouth 4 (four) times a day as needed for congestion 120 mL 0    diclofenac sodium (VOLTAREN) 1 % Apply 2 g topically 4 (four) times a day (Patient not taking: Reported on 12/21/2021 ) 1 Tube 1    Diclofenac Sodium (VOLTAREN) 1 % Apply 2 g topically 4 (four) times a day (Patient not taking: Reported on 12/21/2021 ) 100 g 0    Diclofenac Sodium (VOLTAREN) 1 % Apply 2 g topically 4 (four) times a day (Patient not taking: Reported on 12/21/2021 ) 100 g 2    dicyclomine (BENTYL) 20 mg tablet Take 1 tablet (20 mg total) by mouth 2 (two) times a day 20 tablet 0    ibuprofen (MOTRIN) 800 mg tablet Take 1 tablet (800 mg total) by mouth 3 (three) times a day (Patient not taking: Reported on 12/21/2021 ) 21 tablet 0    naproxen (NAPROSYN) 500 mg tablet Take 1 tablet (500 mg total) by mouth every 12 (twelve) hours as needed for mild pain (Patient not taking: Reported on 9/28/2021) 30 tablet 0    naproxen (Naprosyn) 500 mg tablet Take 1 tablet (500 mg total) by mouth 2 (two) times a day with meals (Patient not taking: Reported on 12/21/2021 ) 20 tablet 0    ondansetron (Zofran ODT) 4 mg disintegrating tablet Take 1 tablet (4 mg total) by mouth every 6 (six) hours as needed for nausea or vomiting 20 tablet 0     No current facility-administered medications for this visit  No Known Allergies    Review of Systems   Constitutional: Positive for chills and fatigue  Negative for fever  HENT: Positive for congestion, rhinorrhea and sore throat  Respiratory: Positive for cough  Negative for shortness of breath  Musculoskeletal: Positive for myalgias  Objective: There were no vitals filed for this visit  Physical Exam    VIRTUAL VISIT DISCLAIMER    Casey Vivas verbally agrees to participate in [employer]  Pt is aware that [employer] could be limited without vital signs or the ability to perform a full hands-on physical Mortimer Na understands she or the provider may request at any time to terminate the video visit and request the patient to seek care or treatment in person

## 2022-01-11 NOTE — LETTER
January 11, 2022     Patient: Laurey Gowers   YOB: 1980   Date of Visit: 1/11/2022       To Whom it May Concern:    Laurey Gowers was seen virtually in my clinic on 1/11/2022  She is currently not able to return to work until her Covid results return  If you have any questions or concerns, please don't hesitate to call        Sincerely,        Helen Mejia MD        CC: No Recipients

## 2022-01-11 NOTE — TELEPHONE ENCOUNTER
2nd attempt    Called and spoke to patient    Patient has been scheduled    Tuesday Feb 08th @ 8:30am   Patient aware of date, time and location

## 2022-01-12 PROCEDURE — U0005 INFEC AGEN DETEC AMPLI PROBE: HCPCS | Performed by: FAMILY MEDICINE

## 2022-01-12 PROCEDURE — U0003 INFECTIOUS AGENT DETECTION BY NUCLEIC ACID (DNA OR RNA); SEVERE ACUTE RESPIRATORY SYNDROME CORONAVIRUS 2 (SARS-COV-2) (CORONAVIRUS DISEASE [COVID-19]), AMPLIFIED PROBE TECHNIQUE, MAKING USE OF HIGH THROUGHPUT TECHNOLOGIES AS DESCRIBED BY CMS-2020-01-R: HCPCS | Performed by: FAMILY MEDICINE

## 2022-01-13 LAB — SARS-COV-2 RNA RESP QL NAA+PROBE: NEGATIVE

## 2022-01-14 ENCOUNTER — TELEPHONE (OUTPATIENT)
Dept: FAMILY MEDICINE CLINIC | Facility: CLINIC | Age: 42
End: 2022-01-14

## 2022-01-14 NOTE — LETTER
January 14, 2022    Patient: Rachel Beal  YOB: 1980  Date of Last Encounter: 1/11/2022      To whom it may concern:     Rachel Beal has an ongoing exposure to COVID-19 (Coronavirus) in her household  She will should test on or after day 5 from last exposure  If she tests negative on or after 01/18/22:  She may return to work      Sincerely,         Nellene Brittle, CRNP

## 2022-01-14 NOTE — TELEPHONE ENCOUNTER
televisit for daugther/covid f/u  During that televisit, she asked for note regarding her exposure/return to work parameters - note generated in "letters"

## 2022-02-08 ENCOUNTER — APPOINTMENT (OUTPATIENT)
Dept: RADIOLOGY | Facility: OTHER | Age: 42
End: 2022-02-08
Payer: MEDICARE

## 2022-02-08 ENCOUNTER — OFFICE VISIT (OUTPATIENT)
Dept: OBGYN CLINIC | Facility: OTHER | Age: 42
End: 2022-02-08
Payer: MEDICARE

## 2022-02-08 VITALS
HEIGHT: 71 IN | SYSTOLIC BLOOD PRESSURE: 102 MMHG | HEART RATE: 70 BPM | DIASTOLIC BLOOD PRESSURE: 68 MMHG | WEIGHT: 150 LBS | BODY MASS INDEX: 21 KG/M2

## 2022-02-08 DIAGNOSIS — M67.814 BICEPS TENDINOSIS OF LEFT SHOULDER: ICD-10-CM

## 2022-02-08 DIAGNOSIS — M54.2 NECK PAIN: ICD-10-CM

## 2022-02-08 DIAGNOSIS — M54.2 NECK PAIN: Primary | ICD-10-CM

## 2022-02-08 PROCEDURE — 99214 OFFICE O/P EST MOD 30 MIN: CPT | Performed by: FAMILY MEDICINE

## 2022-02-08 PROCEDURE — 72040 X-RAY EXAM NECK SPINE 2-3 VW: CPT

## 2022-02-08 RX ORDER — CYCLOBENZAPRINE HCL 10 MG
10 TABLET ORAL
Qty: 30 TABLET | Refills: 1 | Status: SHIPPED | OUTPATIENT
Start: 2022-02-08

## 2022-02-08 RX ORDER — METHYLPREDNISOLONE 4 MG/1
TABLET ORAL
Qty: 21 TABLET | Refills: 0 | Status: SHIPPED | OUTPATIENT
Start: 2022-02-08

## 2022-02-08 NOTE — LETTER
February 8, 2022     Patient: Curry Gomez   YOB: 1980   Date of Visit: 2/8/2022       To Whom it May Concern:    Curry Gomez is under my professional care  She was seen in my office on 2/8/2022  She may return to work on 2/14/22  Upon return to work on 02/14/2022 recommend light duty restrictions to include no pulling pushing lifting with the left arm over 10 lb  Patient to be re-evaluated within the next 4 weeks on or about 03/09/2022  If you have any questions or concerns, please don't hesitate to call           Sincerely,          Jamal Pillai III, DO        CC: Curry Gomez

## 2022-02-08 NOTE — PROGRESS NOTES
1  Neck pain  methylPREDNISolone 4 MG tablet therapy pack    XR spine cervical 2 or 3 vw injury    MRI cervical spine wo contrast    cyclobenzaprine (FLEXERIL) 10 mg tablet   2  Biceps tendinosis of left shoulder  Ambulatory referral to Sports Medicine     Orders Placed This Encounter   Procedures    XR spine cervical 2 or 3 vw injury    MRI cervical spine wo contrast        Imaging Studies (I personally reviewed images in PACS and report):  MRI of left shoulder 06/22/2021:1   Mild supraspinatus and infraspinatus insertional tendinosis without rotator cuff tear   Mild biceps tendinosis without tear      X-ray of left shoulder 4/ 7/21:  No acute osseous abnormality  IMPRESSION:  Left shoulder biceps tendinitis  Left AC joint arthritis  Ultrasound-guided corticosteroid injection left biceps and AC joint       Repeat X-ray next visit: None    Return for Follow-up after MRI is completed for review  Patient Instructions   Explained the patient that she has diffuse pain from her neck and upper trapezius rain all way down her arm and sometimes into her hand with numbness and tingling  Explained that this may be due to a pinched nerve the neck which could be the culprit for her pain  Especially since she did not have significant long-lasting relief with corticosteroid injections to the shoulders in 2 different locations after her last visit in September of 2021  As such I have ordered MRI of the neck  In the interim to provide relief I prescribed patient with a oral steroid as well as nighttime muscle relaxer  She is to stop taking Motrin while taking the steroid Medrol Dosepak methylprednisolone  CHIEF COMPLAINT:  Left shoulder pain     HPI:  Michael Lang is a 39 y o  female  who presents for follow up left biceps tendinitis  Last seen 09/28/2021 for symptoms and status post left ultrasound guided AC joint and biceps corticoid injection  Visit 2/8/2022 :  Left arm pain    Patient did have ultrasound-guided [facility] joint as well as biceps tendon sheath injections in September of 2021 with minimal to mild relief that lasted 2 weeks at the most   Since then she has have persistent pain radiating from her trapezius region in her neck down her arm and many times below the elbow into the hand with associated symptoms include numbness and tingling of the left hand  Today she is in moderate to severe pain  She denies any recent injury  Taking over-the-counter Motrin home which offers no relief  Denies fevers chills            Review of Systems      Following history reviewed and update:    Past Medical History:   Diagnosis Date    Anemia     iron def    Chronic pain disorder     back    Migraines     Pneumonia 2018    Seasonal allergies     Seizure ([address]     Seizures [address] [address]     38 years ago     Past Surgical History:   Procedure Laterality Date    AK EXC TUMOR SOFT TISSUE THIGH/KNEE SUBFASC 5+CM Right 2/3/2020    Procedure: EXCISIONAL BIOPSY SOFT  TISSUE MASS RIGHT KNEE;  Surgeon: Maxime Blevins MD;  Location: [facility] MAIN OR;  Service: Orthopedics    TUBAL LIGATION       Social History   Social History     Substance and Sexual Activity   Alcohol Use No     Social History     Substance and Sexual Activity   Drug Use No     Social History     Tobacco Use   Smoking Status Current Every Day Smoker    Packs/day: 0 25    Types: Cigarettes   Smokeless Tobacco Never Used     Family History   Problem Relation Age of Onset    Heart disease Mother     Hypertension Mother      No Known Allergies       Physical Exam  /68 (BP Location: Right arm, Patient Position: Sitting, Cuff Size: Adult)   Pulse 70   Ht 5' 11" (1 803 m)   Wt 68 kg (150 lb)   BMI 20 92 kg/m²     Constitutional:  see vital signs  Gen: well-developed, normocephalic/atraumatic, well-groomed  Eyes: No inflammation or discharge of conjunctiva or lids; sclera clear   Pharynx: no inflammation, lesion, or mass of lips  Neck: supple, no masses, non-distended  MSK: no inflammation, lesion, mass, or clubbing of nails and digits except for other than mentioned below  SKIN: no visible rashes or skin lesions  Pulmonary/Chest: Effort normal  No respiratory distress     NEURO: cranial nerves grossly intact  PSYCH:  Alert and oriented to person, place, and time; recent and remote memory intact; mood normal, no depression, anxiety, or agitation, judgment and insight good and intact     Ortho Exam    LEFT SHOULDER:  Erythema: no  Swelling: no  Increased Warmth: no    Tenderness: trapezius, lateral, diffuse    ROM  Touchdown sign: intact  External Rotation: intact  Internal Rotation: intact    Strength  Abduction: 5/5  ER: 5/5  IR: 5/5    Drop-Arm: negative  Emptycan: +  Belly Press:   Lift-off Test:    Gomez: +  Neer: +  Cross-Arm:   Speeds:     Cervical  ROM: intact  Midline spinous process tenderness: None  Muscular Tenderness: + left paraspinal and upper trapezius  Sensation UE Bilateral:  C5: normal  C6: normal  C7: normal  C8: normal  T1: normal  Strength UE: 5/5 elbow, wrist, fingers bilateral      Procedures

## 2022-02-08 NOTE — PATIENT INSTRUCTIONS
Explained the patient that she has diffuse pain from her neck and upper trapezius rain all way down her arm and sometimes into her hand with numbness and tingling  Explained that this may be due to a pinched nerve the neck which could be the culprit for her pain  Especially since she did not have significant long-lasting relief with corticosteroid injections to the shoulders in 2 different locations after her last visit in September of 2021  As such I have ordered MRI of the neck  In the interim to provide relief I prescribed patient with a oral steroid as well as nighttime muscle relaxer  She is to stop taking Motrin while taking the steroid Medrol Dosepak methylprednisolone

## 2022-02-18 ENCOUNTER — HOSPITAL ENCOUNTER (OUTPATIENT)
Dept: MRI IMAGING | Facility: HOSPITAL | Age: 42
Discharge: HOME | End: 2022-02-18
Payer: MEDICARE

## 2022-02-18 DIAGNOSIS — M54.2 NECK PAIN: ICD-10-CM

## 2022-02-18 PROCEDURE — G1004 CDSM NDSC: HCPCS

## 2022-02-18 PROCEDURE — 72141 MRI NECK SPINE W/O DYE: CPT

## 2022-02-25 ENCOUNTER — TELEPHONE (OUTPATIENT)
Dept: OBGYN CLINIC | Facility: HOSPITAL | Age: 42
End: 2022-02-25

## 2022-02-25 NOTE — TELEPHONE ENCOUNTER
Patient called in today  She thought her appointment with Dr Jenifer Cleveland was today and then realized that it was on 2/24  She is wondering if Dr Jenifer Cleveland could call her  Please advise  Patient's number is [phone]

## 2022-02-25 NOTE — TELEPHONE ENCOUNTER
MRI shows small disc bulge and arthritis in neck  No apparent pinching of nerves  This usually responds to physical therapy and time  Will discuss at her next visit after examination  Occasionally requires injection into the neck by pain management physician

## 2022-02-25 NOTE — TELEPHONE ENCOUNTER
Patient is calling back to speak with Dr Rocky Garcia in reference to her MRI results and recommendations going forward         Please advise      Jenn Marie can be reached at [phone]

## 2022-02-28 NOTE — TELEPHONE ENCOUNTER
Called and spoke to patient  Message related  Patient has follow up appt scheduled for Thursday March 10th @ 5:15pm  Patient aware of date, time and location

## 2022-03-10 ENCOUNTER — TELEPHONE (OUTPATIENT)
Dept: OBGYN CLINIC | Facility: OTHER | Age: 42
End: 2022-03-10

## 2022-03-10 ENCOUNTER — TELEPHONE (OUTPATIENT)
Dept: OBGYN CLINIC | Facility: CLINIC | Age: 42
End: 2022-03-10

## 2022-03-10 ENCOUNTER — TELEMEDICINE (OUTPATIENT)
Dept: OBGYN CLINIC | Facility: OTHER | Age: 42
End: 2022-03-10
Payer: MEDICARE

## 2022-03-10 DIAGNOSIS — M54.2 NECK PAIN: Primary | ICD-10-CM

## 2022-03-10 DIAGNOSIS — M79.602 LEFT ARM PAIN: ICD-10-CM

## 2022-03-10 PROCEDURE — 99212 OFFICE O/P EST SF 10 MIN: CPT | Performed by: FAMILY MEDICINE

## 2022-03-10 NOTE — PROGRESS NOTES
Virtual Brief Visit    Patient is located in the following state in which I hold an active license [place]       Assessment/Plan:    Problem List Items Addressed This Visit     None      Visit Diagnoses     Neck pain    -  Primary    Relevant Orders    [facility] Physical Therapy    Ambulatory referral to Pain Management    Left arm pain        Relevant Orders    Ambulatory referral to Pain Management        Plan:   Referral Pain Management for possible radiculopathy  Recent Visits  No visits were found meeting these conditions  Showing recent visits within past 7 days and meeting all other requirements  Today's Visits  Date Type Provider Dept   03/10/22 [address] [facility] today's visits and meeting all other requirements  Future Appointments  No visits were found meeting these conditions  Showing future appointments within next 150 days and meeting all other requirements     HPI:  Follow-up left arm pain  Patient can use had numbness in the left arm as well as pain shooting down the arm below the elbow  Associated symptoms include neck pain  She has had trial of corticosteroid injection to the shoulder in the past which resulted in approximately 2 weeks relief  Her last examination she had evidence of cervical radiculopathy  MRI ordered did reveal bulging disc with no significant nerve impingement on MRI read  MRI cervical vertebra 02/18/2020:   1  There is nonspecific straightening of the cervical lordosis without subluxation  2   Mild spondylosis    No cord compression or cord signal abnormality           I spent 10 minutes directly with the patient during this visit

## 2022-03-10 NOTE — PROGRESS NOTES
No diagnosis found  No orders of the defined types were placed in this encounter  Imaging Studies (I personally reviewed images in PACS and report):    MRI of cervical spine 02/18/2022: There is nonspecific straightening of the cervical lordosis without subluxation  Mild spondylosis  No cord compression or cord signal abnormality  Past Injections:  Ultrasound-guided biceps tendon injection 09/28/2021  Ultrasound guided AC joint injection 09/28/2021    IMPRESSION:  Left shoulder biceps tendinitis  Left AC joint arthritis  Neck pain        Repeat X-ray next visit: None***    No follow-ups on file  There are no Patient Instructions on file for this visit  CHIEF COMPLAINT:  Follow-up left shoulder and neck    HPI:  Darion Seaman is a 39 y o  female  who presents for       Visit 3/9/2022 :  Patient here for follow-up for left shoulder pain and neck pain  She was last examined on 02/08/2022 for this  Review of Systems   Constitutional: Negative for chills and fever  HENT: Negative for ear pain and sore throat  Eyes: Negative for pain and visual disturbance  Respiratory: Negative for cough and shortness of breath  Cardiovascular: Negative for chest pain and palpitations  Gastrointestinal: Negative for abdominal pain and vomiting  Genitourinary: Negative for dysuria and hematuria  Musculoskeletal: Negative for arthralgias and back pain  Skin: Negative for color change and rash  Neurological: Negative for seizures and syncope  All other systems reviewed and are negative          Following history reviewed and update:    Past Medical History:   Diagnosis Date    Anemia     iron def    Chronic pain disorder     back    Migraines     Pneumonia 2018    Seasonal allergies     Seizure ([address]     Seizures ([address]     38 years ago     Past Surgical History:   Procedure Laterality Date    ME EXC TUMOR SOFT TISSUE THIGH/KNEE SUBFASC 5+CM Right 2/3/2020    Procedure: EXCISIONAL BIOPSY SOFT  TISSUE MASS RIGHT KNEE;  Surgeon: Lindsay Flores MD;  [facility]: [facility] MAIN OR;  Service: Orthopedics    TUBAL LIGATION       Social History   Social History     Substance and Sexual Activity   Alcohol Use No     Social History     Substance and Sexual Activity   Drug Use No     Social History     Tobacco Use   Smoking Status Current Every Day Smoker    Packs/day: 0 25    Types: Cigarettes   Smokeless Tobacco Never Used     Family History   Problem Relation Age of Onset    Heart disease Mother     Hypertension Mother      No Known Allergies       Physical Exam  There were no vitals taken for this visit  Constitutional:  see vital signs  Gen: well-developed, normocephalic/atraumatic, well-groomed  Eyes: No inflammation or discharge of conjunctiva or lids; sclera clear   Pharynx: no inflammation, lesion, or mass of lips  Neck: supple, no masses, non-distended  MSK: no inflammation, lesion, mass, or clubbing of nails and digits except for other than mentioned below  SKIN: no visible rashes or skin lesions  Pulmonary/Chest: Effort normal  No respiratory distress     NEURO: cranial nerves grossly intact  PSYCH:  Alert and oriented to person, place, and time; recent and remote memory intact; mood normal, no depression, anxiety, or agitation, judgment and insight good and intact     Ortho Exam    Cervical  ROM: intact  Midline spinous process tenderness: None  Muscular Tenderness: None  Sensation UE Bilateral:  C5: normal  C6: normal  C7: normal  C8: normal  T1: normal  Strength UE: 5/5 elbow, wrist, fingers bilateral  Reflexes:   [place]:          Procedures

## 2022-03-15 ENCOUNTER — TELEPHONE (OUTPATIENT)
Dept: OBGYN CLINIC | Facility: CLINIC | Age: 42
End: 2022-03-15

## 2022-03-31 ENCOUNTER — EVALUATION (OUTPATIENT)
Dept: PHYSICAL THERAPY | Facility: CLINIC | Age: 42
End: 2022-03-31
Payer: MEDICARE

## 2022-03-31 DIAGNOSIS — M79.602 LEFT ARM PAIN: ICD-10-CM

## 2022-03-31 DIAGNOSIS — M54.2 NECK PAIN: ICD-10-CM

## 2022-03-31 DIAGNOSIS — M54.2 CERVICALGIA: Primary | ICD-10-CM

## 2022-03-31 PROCEDURE — 97162 PT EVAL MOD COMPLEX 30 MIN: CPT

## 2022-03-31 NOTE — PROGRESS NOTES
PT Evaluation     Today's date: 3/31/2022  Patient name: Antony Galarza  : 1980  MRN: [id number]  Referring provider: Jamie Santos  Dx:   Encounter Diagnosis     ICD-10-CM    1  Cervicalgia  M54 2    2  Left arm pain  M79 602                   Assessment  Assessment details: Antony Galarza is a 43 y o  female who presents today to outpatient therapy for evaluation of neck and (L) UE pain  Upon assessment today, pt exhibits postural deviations; decreased/painful (L) shoulder AROM; and decreased (L) UE strength  These impairments are contributing to functional limitations with lifting/carrying; cooking; cleaning; driving; and sleeping  Pt would therefore benefit from PT intervention in order to address the aforementioned deficits so that she can return to her PLOF and function comfortably/safely in her home and surrounding environment  Thank you for the referral! - Danilo Kent, PT, DPT  Impairments: abnormal or restricted ROM, abnormal movement, impaired balance, impaired physical strength, pain with function and poor posture     Symptom irritability: highUnderstanding of Dx/Px/POC: good   Prognosis: good    Goals  STG 1: Pt will demonstrate compliance w/ HEP to supplement therapy in 1-2 weeks  STG 2: Pt will report centralization of (L) UE pain by 25% in 2-4 weeks  LTG 1: Pt will report centralization of (L) UE pain by 50% in 6-8 weeks  LTG 2: Pt will be able to reach [facility] with min increased symptoms in 6-8 weeks  LTG 3: Pt will be able to cook with min difficulty related to the (L) UE in 6-8 weeks  LTG 4: Pt will be able to clean her home with min difficulty related to the (L) UE in 6-8 weeks  LTG 5: Pt will be able to drive with min difficulty in 6-8 weeks  LTG 6: Pt will be able to sleep comfortably at night with min to no difficulty related to the (L) UE in 6-8 weeks      Plan  Plan details: Educated pt today about PT goals; prognosis; diagnosis; concepts of centralization vs peripheralization; S/S neural involvement; goals of C/S retractions; and treatment for the shoulder vs neck  Provided pt updated HEP consisting of C/S retractions and encouraged pt to perform daily provided no symptom worsening/peripheralization occurs - pt verbalized understanding  Patient would benefit from: skilled physical therapy  Planned modality interventions: cryotherapy, TENS, traction and unattended electrical stimulation  Planned therapy interventions: abdominal trunk stabilization, self care, postural training, patient education, neuromuscular re-education, joint mobilization, manual therapy, massage, activity modification, balance, body mechanics training, breathing training, Richardson taping, strengthening, stretching, therapeutic activities, coordination, flexibility, home exercise program, therapeutic exercise and functional ROM exercises  Frequency: 2x week  Duration in weeks: 6  Treatment plan discussed with: patient        Subjective Evaluation    History of Present Illness  Mechanism of injury: Pt reports onset of (L) UE pain which began last year  She f/u with Dr Robel Almeida and then Dr Kiersten Tello and obtained three different injections with minimal relief  She also obtained an MRI and was dx with tendonitis thru the (L) shoulder and arthritis/a bulging disc in the neck  Currently, pt reports difficulty lifting/carrying; performing housework; cooking; driving; and sleeping  She f/u with pain management in May  Aggs: Using the (L) UE  Pain  Current pain ratin  At best pain ratin  At worst pain rating: 10  Location: Pt reports pain thru the (L) lateral neck, superior shoulder and anterior arm  She also reports occasional N/T thru the (L) wrist/fingers      Patient Goals  Patient goals for therapy: decreased pain and return to work          Objective     Postural Observations    Additional Postural Observation Details  Rounded shoulders, increased hiking thru (L) UE compared to (R)     Active Range of Motion   Cervical/Thoracic Spine       Cervical    Flexion:  Restriction level: minimal  Extension:  with pain Restriction level: maximal  Left lateral flexion:  with pain Restriction level: minimal  Right lateral flexion:  WFL  Left rotation:  WFL  Right rotation:  with pain Restriction level: minimal    Thoracic    Flexion:  WFL  Extension:  Restriction level: minimal  Left rotation:  Restriction level: minimal  Right rotation:  Restriction level: moderate  Left Shoulder   Flexion: 105 degrees with pain  Abduction: 95 degrees with pain  External rotation BTH: Active external rotation behind the head: Unable at this time  Internal rotation BTB: lumbar with pain    Right Shoulder   Flexion: 170 degrees   Abduction: 170 degrees   External rotation BTH: T4   Internal rotation BTB: T5   Mechanical Assessment    Cervical    Seated retraction: repeated movements   Pain location: no change  Pain intensity: worse    Thoracic      Lumbar      Strength/Myotome Testing     Left Shoulder     Planes of Motion   Flexion: 4-   Abduction: 4   External rotation at 0°: 4-   Internal rotation at 0°: 3+     Isolated Muscles   Biceps: 3+   Triceps: 3+     Right Shoulder     Planes of Motion   Flexion: 5   Abduction: 5   External rotation at 0°: 5   Internal rotation at 0°: 5     Isolated Muscles   Biceps: 5   Triceps: 5     Additional Strength Details   strength on (R): 92#, (L): 50#  LT# on (L)  General Comments:      Cervical/Thoracic Comments  Held additional testing/assessment secondary to high symptom irritability        Flowsheet Rows      Most Recent Value   PT/OT G-Codes    Current Score 59   Projected Score 69             Precautions: Migraines; chronic LBP; anemia  Date 3/31            FOTO IE             Re-eval IE                Manuals 3/31            C/S Manual traction Trial NV                                      Neuro Re-Ed 3/31            Scap retractions yesica Pak w/ postural overcorrect nv            No moneys nv            Tband rows/ext             Seated chin tucks 2x10 HEP            Swimmer's n glide             FRO seated nv            Supine chin tucks nv            SA punches                                       Ther Ex 3/31            Seated UT (S) nv            Seated LS (S) nv            Digi Flex             Bicep Curls             Tband Triceps             Iso tband IR/ER - (L) nv            Corner pec (S) nv            T/S ext over ball nv            T/S rot nv            Scap, FF             Standing cane ext nv            Open books nv            Supine cane flex nv            Supine hor abd w/ tband nv                                      Ther Activity 3/31            Retro UBE             Pulleys - flex, scap nv            SWB Flex nv            Table Slides  nv            Pt Edu AL                         Modalities 3/31            MH or ice

## 2022-04-05 ENCOUNTER — APPOINTMENT (OUTPATIENT)
Dept: PHYSICAL THERAPY | Facility: CLINIC | Age: 42
End: 2022-04-05
Payer: MEDICARE

## 2022-04-07 ENCOUNTER — OFFICE VISIT (OUTPATIENT)
Dept: PHYSICAL THERAPY | Facility: CLINIC | Age: 42
End: 2022-04-07
Payer: MEDICARE

## 2022-04-07 DIAGNOSIS — M79.602 LEFT ARM PAIN: ICD-10-CM

## 2022-04-07 DIAGNOSIS — M54.2 NECK PAIN: ICD-10-CM

## 2022-04-07 DIAGNOSIS — M54.2 CERVICALGIA: Primary | ICD-10-CM

## 2022-04-07 PROCEDURE — 97110 THERAPEUTIC EXERCISES: CPT

## 2022-04-07 PROCEDURE — 97530 THERAPEUTIC ACTIVITIES: CPT

## 2022-04-07 NOTE — PROGRESS NOTES
Daily Note     Today's date: 2022  Patient name: Su Amaro  : 1980  MRN: [id number]  Referring provider: Jayna Adler  Dx:   Encounter Diagnosis     ICD-10-CM    1  Cervicalgia  M54 2    2  Left arm pain  M79 602    3  Neck pain  M54 2        Start Time: 1000  Stop Time: 1031  Total time in clinic (min): 31 minutes  Subjective: Pt arrives to first f/u since IE reporting slight improvements in (L)UE and C/S pan Sx - attributes this to increasing activity levels by playing handball with her kids  Pt notes high Sx irritability in (L) shldr following Eval    Pt states she uses [employer] which helps in the moment /c little/no carryover - pt notes she has tried heat but notes that after 15' she felt a deep pain in her shldr       Objective: See treatment diary below  Assessment: Implemented exercise diary per PT POC as indicated below - notified pt that interventions may be uncomfortable; however, pt should notify therapist of any increases in or sharp bouts of pain Sx  Overall, pt tolerated Tx poorly noting high Sx irritabilty in (L) shldr during most interventions  During scap squeezes - instructed pt to perform isometrics /c 30% intensity/within tolerance  Pt reports significant pain in (L) shldr during chin tucks, manual traction, and gentle C/S flexion (trialled C/S flexion to assess directional preference)  Pt tolerates UT (S) well, mild sensitivity during LS (S) - instructed to perform to tolerance and added HEP for these and advised pt to stretch at least 1-2x daily to improve mobility/tolerance  Pt would benefit from continued PT  Plan: Cont /c PT POC  Progress as tolerated  Precautions: Migraines; chronic LBP; anemia  Date 3/31 04/07           Visit  1 2           FOTO IE             Re-eval IE                Manuals 3/31 04/07           C/S Manual traction Trial NV Sp p!                                      Neuro Re-Ed 3/31 04/07           Scap retractions nv 5"x p! Slouch w/ postural overcorrect nv            No moneys nv            Tband rows/ext             Seated chin tucks 2x10 HEP            Swimmer's n glide             FRO seated nv            Supine chin tucks nv            SA punches                                       Ther Ex 3/31 04/07           Seated UT (S) nv 10"x10           Seated LS (S) nv 10"x10           Digi Flex             Bicep Curls             Tband Triceps             Iso tband IR/ER - (L) nv            Corner pec (S) nv            T/S ext over ball nv 2'            T/S rot nv 30" p!            Scap, FF             Standing cane ext nv            Open books nv            Supine cane flex nv            Supine hor abd w/ tband nv                                      Ther Activity 3/31 04/07           Retro UBE             Pulleys - flex, scap nv 5'            SWB Flex nv 3'            Table Slides  nv            Pt Edu AL SP HEP                        Modalities 3/31 04/07            or mahi Umana, PTA

## 2022-04-12 ENCOUNTER — APPOINTMENT (OUTPATIENT)
Dept: PHYSICAL THERAPY | Facility: CLINIC | Age: 42
End: 2022-04-12
Payer: MEDICARE

## 2022-04-14 ENCOUNTER — APPOINTMENT (OUTPATIENT)
Dept: PHYSICAL THERAPY | Facility: CLINIC | Age: 42
End: 2022-04-14
Payer: MEDICARE

## 2022-04-21 ENCOUNTER — APPOINTMENT (OUTPATIENT)
Dept: PHYSICAL THERAPY | Facility: CLINIC | Age: 42
End: 2022-04-21
Payer: MEDICARE

## 2022-04-21 NOTE — PROGRESS NOTES
Daily Note     Today's date: 2022  Patient name: Tavo Brandt  : 1980  MRN: [id number]  Referring provider: Angeles Ballard  Dx: No diagnosis found  Subjective: ***      Objective: See treatment diary below      Assessment: Tolerated treatment {Tolerated treatment :[id number]}  Patient {assessment:[id number]}      Plan: Continue per plan of care  Precautions: Migraines; chronic LBP; anemia  Date 3/31 04/07           Visit  1 2           FOTO IE             Re-eval IE                Manuals 3/31 04/07           C/S Manual traction Trial NV Sp p! Neuro Re-Ed 3/31 04/07           Scap retractions nv 5"x p! Slouch w/ postural overcorrect nv            No moneys nv            Tband rows/ext             Seated chin tucks 2x10 HEP            Swimmer's n glide             FRO seated nv            Supine chin tucks nv            SA punches                                       Ther Ex 3/31 04/07           Seated UT (S) nv 10"x10           Seated LS (S) nv 10"x10           Digi Flex             Bicep Curls             Tband Triceps             Iso tband IR/ER - (L) nv            Corner pec (S) nv            T/S ext over ball nv 2'            T/S rot nv 30" p!            Scap, FF             Standing cane ext nv            Open books nv            Supine cane flex nv            Supine hor abd w/ tband nv                                      Ther Activity 3/31 04/07           Retro UBE             Pulleys - flex, scap nv 5'            SWB Flex nv 3'            Table Slides  nv            Pt Edu AL SP HEP                        Modalities 3/31 04/07           MH or ice

## 2022-04-22 ENCOUNTER — OFFICE VISIT (OUTPATIENT)
Dept: PHYSICAL THERAPY | Facility: CLINIC | Age: 42
End: 2022-04-22
Payer: MEDICARE

## 2022-04-22 DIAGNOSIS — M54.2 CERVICALGIA: Primary | ICD-10-CM

## 2022-04-22 DIAGNOSIS — M79.602 LEFT ARM PAIN: ICD-10-CM

## 2022-04-22 DIAGNOSIS — M54.2 NECK PAIN: ICD-10-CM

## 2022-04-22 PROCEDURE — 97110 THERAPEUTIC EXERCISES: CPT

## 2022-04-22 PROCEDURE — 97112 NEUROMUSCULAR REEDUCATION: CPT

## 2022-04-22 PROCEDURE — 97530 THERAPEUTIC ACTIVITIES: CPT

## 2022-04-22 NOTE — PROGRESS NOTES
Daily Note     Today's date: 2022  Patient name: Gary Garcia  : 1980  MRN: [id number]  Referring provider: Levon Kay  Dx:   Encounter Diagnosis     ICD-10-CM    1  Cervicalgia  M54 2    2  Left arm pain  M79 602    3  Neck pain  M54 2        Start Time: 1100  Stop Time: 1138  Total time in clinic (min): 38 minutes  Subjective: Pt notes she has been performing HEP since LV - notes exercises have been getting easier but had a flare up the last few days in (L)UE + shldr that she wonders if it is related to the weather  Pt reports she has not been able to play handball with kids at the park recently 2/2 increased pain/stiffness  Objective: See treatment diary below  Assessment:   Pt was able to complete additions to exercise diary without c/o increased pain, does note some discomfort with (L) shldr ER isometric after several reps  Pt reports "tightness" in (L) shldr during Tx - ended /c IR + ER stretches which pt notes she was fearful of pain during but tolerated well  Suggested pt perform at home daily and then will add strengthening to HEP in upcoming visits pending response to today's Tx  Pt would benefit from continued PT  Plan: Cont /c PT POC  Progress as tolerated  Precautions: Migraines; chronic LBP; anemia  Date 3/31 04/07 04/22          Visit  1 2 3          FOTO IE             Re-eval IE                Manuals 3/31 04/07 04/22          C/S Manual traction Trial NV Sp p! Neuro Re-Ed 3/31 04/07 04/22          Scap retractions nv 5"x p!            Slouch w/ postural overcorrect nv            No moneys [employer] Rx Row   9Rx45          Larisa Ext   5Rx45          Seated chin tucks 2x10 HEP            Swimmer's n glide             FRO seated nv            Supine chin tucks nv            SA punches                                       Ther Ex 3/31 04/07 04/22          Seated UT (S) nv 10"x10           Seated LS (S) nv 10"x10           Digi Flex             Bicep Curls             [place] Triceps   6Rx30          Iso [place]  IR (L) nv  1 5Rx30          Iso [place] ER (L)   1 5Rx30          Corner pec (S) nv  5"x10          BTB (R) IR (S)   5"x10          T/S ext over ball nv 2'            T/S rot nv 30" p! Scap, FF             Standing cane ext nv            Open books nv            Supine cane flex nv            Supine hor abd w/ tband nv                                      Ther Activity 3/31 04/07 04/22          Retro UBE   nv?           Pulleys - flex, scap nv 5'  5'          SWB Flex nv 3'            Table Slides  nv            Pt Edu AL SP HEP SP                       Modalities 3/31 04/07 04/22          MH or ice                                  Clotilda Born, PTA

## 2022-04-25 ENCOUNTER — APPOINTMENT (OUTPATIENT)
Dept: PHYSICAL THERAPY | Facility: CLINIC | Age: 42
End: 2022-04-25
Payer: MEDICARE

## 2022-04-25 NOTE — PROGRESS NOTES
Daily Note     Today's date: 2022  Patient name: Sierra Marcelo  : 1980  MRN: [id number]  Referring provider: Serena Brito  Dx: No diagnosis found  Subjective: ***      Objective: See treatment diary below      Assessment: Tolerated treatment well  Patient demonstrated fatigue post treatment, exhibited good technique with therapeutic exercises and would benefit from continued PT      Plan: Continue per plan of care  Progress treatment as tolerated  Precautions: Migraines; chronic LBP; anemia  Date 3/31 04/07 04/22 4/25         Visit  1 2 3 4         FOTO IE             Re-eval IE                Manuals 3/31 04/07 04/22 4/25         C/S Manual traction Trial NV Sp p! Neuro Re-Ed 3/31 04/07 04/22 4/25         Scap retractions nv 5"x p! Slouch w/ postural overcorrect nv            No moneys [employer] Rx Row   9Rx45          Larisa Ext   5Rx45          Seated chin tucks 2x10 HEP            Swimmer's n glide             FRO seated nv            Supine chin tucks nv            SA punches                                       Ther Ex 3/31 04/07 04/22 4/25         Seated UT (S) nv 10"x10           Seated LS (S) nv 10"x10           Digi Flex             Bicep Curls             [place] Triceps   6Rx30          Iso Center  IR (L) nv  1 5Rx30          Iso [place] ER (L)   1 5Rx30          Corner pec (S) nv  5"x10          BTB (R) IR (S)   5"x10          T/S ext over ball nv 2'            T/S rot nv 30" p! Scap, FF             Standing cane ext nv            Open books nv            Supine cane flex nv            Supine hor abd w/ tband nv                                      Ther Activity 3/31 04/07 04/22 4/25         Retro UBE   nv?           Pulleys - flex, scap nv 5'  5'          SWB Flex nv 3'            Table Slides  nv            Pt Edu AL SP HEP SP                       Modalities 3/31 04/07 04/22 4/25 MH or ice

## 2022-05-13 ENCOUNTER — TELEPHONE (OUTPATIENT)
Dept: PAIN MEDICINE | Facility: MEDICAL CENTER | Age: 42
End: 2022-05-13

## 2022-06-12 ENCOUNTER — HOSPITAL ENCOUNTER (EMERGENCY)
Facility: HOSPITAL | Age: 42
Discharge: HOME | End: 2022-06-12
Attending: EMERGENCY MEDICINE | Admitting: EMERGENCY MEDICINE
Payer: MEDICARE

## 2022-06-12 VITALS
OXYGEN SATURATION: 100 % | SYSTOLIC BLOOD PRESSURE: 109 MMHG | BODY MASS INDEX: 21.17 KG/M2 | HEART RATE: 91 BPM | TEMPERATURE: 98.7 F | WEIGHT: 151.8 LBS | RESPIRATION RATE: 18 BRPM | DIASTOLIC BLOOD PRESSURE: 72 MMHG

## 2022-06-12 DIAGNOSIS — R10.9 ABDOMINAL PAIN: Primary | ICD-10-CM

## 2022-06-12 DIAGNOSIS — A59.9 TRICHIMONIASIS: ICD-10-CM

## 2022-06-12 DIAGNOSIS — Z11.3 SCREENING EXAMINATION FOR STD (SEXUALLY TRANSMITTED DISEASE): ICD-10-CM

## 2022-06-12 LAB
ALBUMIN SERPL BCP-MCNC: 4.3 G/DL (ref 3–5.2)
ALP SERPL-CCNC: 61 U/L (ref 43–122)
ALT SERPL W P-5'-P-CCNC: 14 U/L
ANION GAP SERPL CALCULATED.3IONS-SCNC: 6 MMOL/L (ref 5–14)
AST SERPL W P-5'-P-CCNC: 26 U/L (ref 14–36)
BACTERIA UR QL AUTO: ABNORMAL /HPF
BASOPHILS # BLD AUTO: 0.01 THOUSANDS/ΜL (ref 0–0.1)
BASOPHILS NFR BLD AUTO: 0 % (ref 0–1)
BILIRUB SERPL-MCNC: 0.66 MG/DL
BILIRUB UR QL STRIP: NEGATIVE
BUN SERPL-MCNC: 6 MG/DL (ref 5–25)
CALCIUM SERPL-MCNC: 9.1 MG/DL (ref 8.4–10.2)
CHLORIDE SERPL-SCNC: 106 MMOL/L (ref 97–108)
CLARITY UR: ABNORMAL
CO2 SERPL-SCNC: 27 MMOL/L (ref 22–30)
COLOR UR: YELLOW
CREAT SERPL-MCNC: 0.76 MG/DL (ref 0.6–1.2)
EOSINOPHIL # BLD AUTO: 0.19 THOUSAND/ΜL (ref 0–0.61)
EOSINOPHIL NFR BLD AUTO: 4 % (ref 0–6)
ERYTHROCYTE [DISTWIDTH] IN BLOOD BY AUTOMATED COUNT: 13.7 % (ref 11.6–15.1)
EXT PREG TEST URINE: NEGATIVE
EXT. CONTROL ED NAV: NORMAL
GFR SERPL CREATININE-BSD FRML MDRD: 97 ML/MIN/1.73SQ M
GLUCOSE SERPL-MCNC: 90 MG/DL (ref 70–99)
GLUCOSE UR STRIP-MCNC: NEGATIVE MG/DL
HCT VFR BLD AUTO: 44 % (ref 34.8–46.1)
HGB BLD-MCNC: 13.4 G/DL (ref 11.5–15.4)
HGB UR QL STRIP.AUTO: 25
IMM GRANULOCYTES # BLD AUTO: 0.01 THOUSAND/UL (ref 0–0.2)
IMM GRANULOCYTES NFR BLD AUTO: 0 % (ref 0–2)
KETONES UR STRIP-MCNC: NEGATIVE MG/DL
LEUKOCYTE ESTERASE UR QL STRIP: 500
LYMPHOCYTES # BLD AUTO: 1.76 THOUSANDS/ΜL (ref 0.6–4.47)
LYMPHOCYTES NFR BLD AUTO: 36 % (ref 14–44)
MCH RBC QN AUTO: 25 PG (ref 26.8–34.3)
MCHC RBC AUTO-ENTMCNC: 30.5 G/DL (ref 31.4–37.4)
MCV RBC AUTO: 82 FL (ref 82–98)
MONOCYTES # BLD AUTO: 0.4 THOUSAND/ΜL (ref 0.17–1.22)
MONOCYTES NFR BLD AUTO: 8 % (ref 4–12)
MUCOUS THREADS UR QL AUTO: ABNORMAL
NEUTROPHILS # BLD AUTO: 2.55 THOUSANDS/ΜL (ref 1.85–7.62)
NEUTS SEG NFR BLD AUTO: 52 % (ref 43–75)
NITRITE UR QL STRIP: NEGATIVE
NON-SQ EPI CELLS URNS QL MICRO: ABNORMAL /HPF
NRBC BLD AUTO-RTO: 0 /100 WBCS
OTHER STN SPEC: ABNORMAL
PH UR STRIP.AUTO: 6 [PH]
PLATELET # BLD AUTO: 162 THOUSANDS/UL (ref 149–390)
PMV BLD AUTO: 11.3 FL (ref 8.9–12.7)
POTASSIUM SERPL-SCNC: 3.7 MMOL/L (ref 3.6–5)
PROT SERPL-MCNC: 8 G/DL (ref 5.9–8.4)
PROT UR STRIP-MCNC: NEGATIVE MG/DL
RBC # BLD AUTO: 5.37 MILLION/UL (ref 3.81–5.12)
RBC #/AREA URNS AUTO: ABNORMAL /HPF
SODIUM SERPL-SCNC: 139 MMOL/L (ref 137–147)
SP GR UR STRIP.AUTO: 1.02 (ref 1–1.04)
UROBILINOGEN UA: 1 MG/DL
WBC # BLD AUTO: 4.92 THOUSAND/UL (ref 4.31–10.16)
WBC #/AREA URNS AUTO: ABNORMAL /HPF

## 2022-06-12 PROCEDURE — 36415 COLL VENOUS BLD VENIPUNCTURE: CPT | Performed by: EMERGENCY MEDICINE

## 2022-06-12 PROCEDURE — 99284 EMERGENCY DEPT VISIT MOD MDM: CPT | Performed by: EMERGENCY MEDICINE

## 2022-06-12 PROCEDURE — 99284 EMERGENCY DEPT VISIT MOD MDM: CPT

## 2022-06-12 PROCEDURE — 87660 TRICHOMONAS VAGIN DIR PROBE: CPT | Performed by: EMERGENCY MEDICINE

## 2022-06-12 PROCEDURE — 87510 GARDNER VAG DNA DIR PROBE: CPT | Performed by: EMERGENCY MEDICINE

## 2022-06-12 PROCEDURE — 81025 URINE PREGNANCY TEST: CPT | Performed by: EMERGENCY MEDICINE

## 2022-06-12 PROCEDURE — 85025 COMPLETE CBC W/AUTO DIFF WBC: CPT | Performed by: EMERGENCY MEDICINE

## 2022-06-12 PROCEDURE — 87480 CANDIDA DNA DIR PROBE: CPT | Performed by: EMERGENCY MEDICINE

## 2022-06-12 PROCEDURE — 96360 HYDRATION IV INFUSION INIT: CPT

## 2022-06-12 PROCEDURE — 87491 CHLMYD TRACH DNA AMP PROBE: CPT | Performed by: EMERGENCY MEDICINE

## 2022-06-12 PROCEDURE — 96372 THER/PROPH/DIAG INJ SC/IM: CPT

## 2022-06-12 PROCEDURE — 80053 COMPREHEN METABOLIC PANEL: CPT | Performed by: EMERGENCY MEDICINE

## 2022-06-12 PROCEDURE — 81001 URINALYSIS AUTO W/SCOPE: CPT | Performed by: EMERGENCY MEDICINE

## 2022-06-12 PROCEDURE — 87591 N.GONORRHOEAE DNA AMP PROB: CPT | Performed by: EMERGENCY MEDICINE

## 2022-06-12 RX ORDER — DOXYCYCLINE HYCLATE 100 MG/1
100 CAPSULE ORAL 2 TIMES DAILY
Qty: 20 CAPSULE | Refills: 0 | Status: SHIPPED | OUTPATIENT
Start: 2022-06-12 | End: 2022-06-22

## 2022-06-12 RX ORDER — METRONIDAZOLE 500 MG/1
500 TABLET ORAL ONCE
Status: COMPLETED | OUTPATIENT
Start: 2022-06-12 | End: 2022-06-12

## 2022-06-12 RX ORDER — METRONIDAZOLE 500 MG/1
500 TABLET ORAL EVERY 12 HOURS SCHEDULED
Qty: 14 TABLET | Refills: 0 | Status: SHIPPED | OUTPATIENT
Start: 2022-06-12 | End: 2022-06-19

## 2022-06-12 RX ORDER — DOXYCYCLINE HYCLATE 100 MG/1
100 CAPSULE ORAL ONCE
Status: COMPLETED | OUTPATIENT
Start: 2022-06-12 | End: 2022-06-12

## 2022-06-12 RX ADMIN — CEFTRIAXONE 500 MG: 500 INJECTION, POWDER, FOR SOLUTION INTRAMUSCULAR; INTRAVENOUS at 08:25

## 2022-06-12 RX ADMIN — SODIUM CHLORIDE 1000 ML: 0.9 INJECTION, SOLUTION INTRAVENOUS at 07:14

## 2022-06-12 RX ADMIN — METRONIDAZOLE 500 MG: 500 TABLET ORAL at 08:24

## 2022-06-12 RX ADMIN — DOXYCYCLINE 100 MG: 100 CAPSULE ORAL at 08:23

## 2022-06-12 NOTE — ED CARE HANDOFF
Emergency Department Sign Out Note        Sign out and transfer of care from Dr Danny Moise  See Separate Emergency Department note  The patient, Heath Tierney, was evaluated by the previous provider for abdominal pain  Workup Completed:  CBC  CMP  UA/micro  Upreg    ED Course / Workup Pending (followup):                                ED Course as of 06/12/22 0822   [place] Jun 12, 2022   0700 Sign out: patient with LLQ pain pending follow up labs  1406 Comprehensive metabolic panel  Grossly normal   0740 PREGNANCY TEST URINE: negative   0740 Leukocytes, UA(!): 500 0   0740 Clarity, UA(!): Slightly Cloudy   0740 Nitrite, UA: Negative   0740 CBC and differential(!)  Grossly normal   0752 WBC, UA(!): 30-50   0752 Bacteria, UA(!): Moderate   0752 OTHER OBSERVATIONS: Trichomonas Organisms Present   0752 MUCUS THREADS(!): Moderate     Procedures  MDM  Number of Diagnoses or Management Options  Abdominal pain  Screening examination for STD (sexually transmitted disease)  Trichimoniasis  Diagnosis management comments: 55-year-old female presenting for evaluation of 1 day of left lower quadrant pain  Patient notes mild urinary symptoms but denies vaginal bleeding or discharge  Patient signed out to me pending results of laboratory studies  Patient has a grossly normal CBC and CMP  Urinalysis shows 30-50 white blood cells with moderate bacteria in addition to Trichomonas  Patient presents today with her significant other; I asked if she would like the significant other to leave the room, but patient declined  Informed patient of the results and my concern for sexually transmitted infection and inclusion of pelvic inflammatory disease on my differential diagnosis  Patient does not feel she has a sexually transmitted infection and denies vaginal discharge but would like to be tested for sexually transmitted infections and empirically treated    Will also treat patient for findings of cystitis, though I suspect her urinary findings may be secondary to pelvic infection  Patient given ceftriaxone IM and will be initiated on 7 days of Flagyl and 10 days of doxycycline for treatment of gonorrhea, Trichomonas, bacterial vaginosis, and chlamydia  The ceftriaxone and doxycycline should cover urinary pathogens, as well  Patient referred to her family care provider for follow-up  Patient will return with any worsening abdominal pain, vaginal discharge, vomiting, fever, or any new or worsening symptoms  Patient is in agreement and understanding of these instructions  Disposition  Final diagnoses:   Abdominal pain   Trichimoniasis   Screening examination for STD (sexually transmitted disease)     Time reflects when diagnosis was documented in both MDM as applicable and the Disposition within this note     Time User Action Codes Description Comment    6/12/2022  6:49 AM Tony Lawrence Add [R10 9] Abdominal pain     6/12/2022  8:09 AM Alison Lebron Add [A59 9] Trichimoniasis     6/12/2022  8:10 AM Alison Lebron Add [Z11 3] Screening examination for STD (sexually transmitted disease)       ED Disposition     ED Disposition   Discharge    Condition   Stable    Date/Time   Sun Jun 12, 2022  8:09 AM    Comment   [place] discharge to home/self care                 Follow-up Information     Follow up With Specialties Details Why Contact Info    Tracy Banuelos MD [facility] Medicine Schedule an appointment as soon as possible for a visit   [facility] [id number]          Patient's Medications   Discharge Prescriptions    DOXYCYCLINE HYCLATE (VIBRAMYCIN) 100 MG CAPSULE    Take 1 capsule (100 mg total) by mouth 2 (two) times a day for 10 days       Start Date: 6/12/2022 End Date: 6/22/2022       Order Dose: 100 mg       Quantity: 20 capsule    Refills: 0    METRONIDAZOLE (FLAGYL) 500 MG TABLET    Take 1 tablet (500 mg total) by mouth every 12 (twelve) hours for 7 days       Start Date: 6/12/2022 End Date: 6/19/2022       Order Dose: 500 mg       Quantity: 14 tablet    Refills: 0     No discharge procedures on file         ED Provider  Electronically Signed by     Waylon Connor MD  06/12/22 9729

## 2022-06-12 NOTE — ED PROVIDER NOTES
History  Chief Complaint   Patient presents with    Abdominal Pain     Pt reports L lower abdominal pain since yesterday, reports no nausea, vomiting, or diarrhea  No sick contacts is not vaccinated  45-year-old female presents with complaint of left lower abdominal discomfort  Symptoms began yesterday  She denies any urinary symptoms, nausea/vomiting/diarrhea/constipation, fever/chills  She has a history of abdominal pain in the past which was caused by urinary tract infections  She has not had any vaginal bleeding or discharge and has taken nothing for pain  Pain is improved whenever she massages the area  Abdominal Pain  Pain location:  LLQ  Pain quality: aching and dull    Pain radiates to:  Does not radiate  Pain severity:  Mild  Onset quality:  Gradual  Duration:  1 day  Timing:  Constant  Progression:  Waxing and waning  Chronicity:  New  Relieved by: massaging the area  Worsened by:  Nothing  Associated symptoms: no anorexia, no chest pain, no chills, no constipation, no cough, no diarrhea, no dysuria, no fatigue, no fever, no hematemesis, no hematuria, no melena, no nausea, no shortness of breath, no sore throat, no vaginal bleeding, no vaginal discharge and no vomiting        Prior to Admission Medications   Prescriptions Last Dose Informant Patient Reported? Taking?    cyclobenzaprine (FLEXERIL) 10 mg tablet Not Taking at Unknown time Self No No   Sig: Take 1 tablet (10 mg total) by mouth daily at bedtime as needed for muscle spasms   Patient not taking: Reported on 6/12/2022   methylPREDNISolone 4 MG tablet therapy pack Not Taking at Unknown time  No No   Sig: Use as directed on package   Patient not taking: Reported on 6/12/2022      Facility-Administered Medications: None       Past Medical History:   Diagnosis Date    Anemia     iron def    Chronic pain disorder     back    Migraines     Pneumonia 2018    Seasonal allergies     [facility]izure [facility] )     Seizures ([facility] )     38 years ago       Past Surgical History:   Procedure Laterality Date    WA EXC TUMOR SOFT TISSUE THIGH/KNEE SUBFASC 5+CM Right 2/3/2020    Procedure: EXCISIONAL BIOPSY SOFT  TISSUE MASS RIGHT KNEE;  Surgeon: Carlos Villatoro MD;  Location: [facility] MAIN OR;  Service: Orthopedics    TUBAL LIGATION         Family History   Problem Relation Age of Onset    Heart disease Mother     Hypertension Mother      I have reviewed and agree with the history as documented  E-Cigarette/Vaping    E-Cigarette Use Never User      E-Cigarette/Vaping Substances    Nicotine No     THC No     CBD No     Flavoring No     Other No     Unknown No      Social History     Tobacco Use    Smoking status: Current Every Day Smoker     Packs/day: 0 25     Types: Cigarettes    Smokeless tobacco: Never Used   Vaping Use    Vaping Use: Never used   Substance Use Topics    Alcohol use: No    Drug use: No       Review of Systems   Constitutional: Negative for appetite change, chills, fatigue and fever  HENT: Negative for postnasal drip, sinus pain, sore throat and trouble swallowing  Eyes: Negative for redness and itching  Respiratory: Negative for cough, chest tightness, shortness of breath and wheezing  Cardiovascular: Negative for chest pain and leg swelling  Gastrointestinal: Positive for abdominal pain  Negative for anorexia, constipation, diarrhea, hematemesis, melena, nausea and vomiting  Endocrine: Negative  Genitourinary: Negative for difficulty urinating, dysuria, hematuria, vaginal bleeding and vaginal discharge  Musculoskeletal: Negative for back pain and myalgias  Skin: Negative for rash  Allergic/Immunologic: Negative  Neurological: Negative for dizziness, numbness and headaches  Hematological: Negative  Psychiatric/Behavioral: Negative  All other systems reviewed and are negative  Physical Exam  Physical Exam  Vitals and nursing note reviewed     Constitutional:       General: She is not in acute distress  Appearance: Normal appearance  She is well-developed  She is not ill-appearing, toxic-appearing or diaphoretic  HENT:      Head: Normocephalic and atraumatic  Right Ear: External ear normal       Left Ear: External ear normal       Nose: Nose normal  No congestion  Mouth/Throat:      Mouth: Mucous membranes are moist       Pharynx: Oropharynx is clear  Eyes:      Conjunctiva/sclera: Conjunctivae normal       Pupils: Pupils are equal, round, and reactive to light  Cardiovascular:      Rate and Rhythm: Normal rate and regular rhythm  Heart sounds: Normal heart sounds  Pulmonary:      Effort: Pulmonary effort is normal  No respiratory distress  Breath sounds: Normal breath sounds  No wheezing  Abdominal:      General: Bowel sounds are normal       Palpations: Abdomen is soft  Tenderness: There is abdominal tenderness in the left lower quadrant  There is no guarding or rebound  Musculoskeletal:         General: No tenderness or deformity  Cervical back: Normal range of motion and neck supple  No rigidity  Skin:     General: Skin is warm and dry  Capillary Refill: Capillary refill takes less than 2 seconds  Findings: No rash  Neurological:      General: No focal deficit present  Mental Status: She is alert and oriented to person, place, and time     Psychiatric:         Mood and Affect: Mood normal          Behavior: Behavior normal          Vital Signs  ED Triage Vitals [06/12/22 0639]   Temperature Pulse Respirations Blood Pressure SpO2   98 7 °F (37 1 °C) 91 18 109/72 100 %      Temp Source Heart Rate Source Patient Position - Orthostatic VS BP Location FiO2 (%)   Tympanic Monitor Sitting Left arm --      Pain Score       --           Vitals:    06/12/22 0639   BP: 109/72   Pulse: 91   Patient Position - Orthostatic VS: Sitting         Visual Acuity      ED Medications  Medications   sodium chloride 0 9 % bolus 1,000 mL (has no administration in time range)       Diagnostic Studies  Results Reviewed     Procedure Component Value Units Date/Time    CBC and differential [[id number]]     Lab Status: No result Specimen: Blood     Comprehensive metabolic panel [[id number]]     Lab Status: No result Specimen: Blood     UA (URINE) with reflex to Scope [[id number]]     Lab Status: No result Specimen: Urine     POCT pregnancy, urine [[id number]]     Lab Status: No result                  No orders to display              Procedures  Procedures         ED Course                               SBIRT 20yo+    Flowsheet Row Most Recent Value   SBIRT (23 yo +)    In order to provide better care to our patients, we are screening all of our patients for alcohol and drug use  Would it be okay to ask you these screening questions? No Filed at: 06/12/2022 2929                    MDM    Disposition  Final diagnoses:   Abdominal pain     Time reflects when diagnosis was documented in both MDM as applicable and the Disposition within this note     Time User Action Codes Description Comment    6/12/2022  6:49 AM Shekhar Pereira [R10 9] Abdominal pain       ED Disposition     None      Follow-up Information    None         Patient's Medications   Discharge Prescriptions    No medications on file       No discharge procedures on file      PDMP Review       Value Time User    PDMP Reviewed  Yes 2/4/2020 10:31 AM Nicolasa Maldonado MD          ED Provider  Electronically Signed by           Yudith Lee DO  06/12/22 0700

## 2022-06-13 LAB
C TRACH DNA SPEC QL NAA+PROBE: NEGATIVE
N GONORRHOEA DNA SPEC QL NAA+PROBE: NEGATIVE

## 2022-06-14 ENCOUNTER — OFFICE VISIT (OUTPATIENT)
Dept: FAMILY MEDICINE CLINIC | Facility: CLINIC | Age: 42
End: 2022-06-14

## 2022-06-14 VITALS
WEIGHT: 154.4 LBS | HEIGHT: 71 IN | DIASTOLIC BLOOD PRESSURE: 60 MMHG | RESPIRATION RATE: 19 BRPM | SYSTOLIC BLOOD PRESSURE: 118 MMHG | OXYGEN SATURATION: 96 % | BODY MASS INDEX: 21.61 KG/M2 | TEMPERATURE: 96.8 F | HEART RATE: 84 BPM

## 2022-06-14 DIAGNOSIS — A59.9 TRICHIMONIASIS: Primary | ICD-10-CM

## 2022-06-14 LAB
CANDIDA RRNA VAG QL PROBE: NEGATIVE
G VAGINALIS RRNA GENITAL QL PROBE: NEGATIVE
T VAGINALIS RRNA GENITAL QL PROBE: NEGATIVE

## 2022-06-14 PROCEDURE — 99213 OFFICE O/P EST LOW 20 MIN: CPT | Performed by: FAMILY MEDICINE

## 2022-06-14 RX ORDER — METRONIDAZOLE 500 MG/1
500 TABLET ORAL EVERY 8 HOURS SCHEDULED
Qty: 4 TABLET | Refills: 0 | Status: SHIPPED | OUTPATIENT
Start: 2022-06-14 | End: 2022-06-15

## 2022-06-14 NOTE — PROGRESS NOTES
Assessment/Plan:    Trichimoniasis  Diagnosis by UA in ED 2 days ago  Patient reports that she was having some increased urination and abdominal pain and had gone to the emergency room  She was started on metronidazole for 14 days  GC/chlamydia was negative  Awaiting affirm test results  Patient had concerns about long-term affects of Trichomonas, and of not being told that she was diagnosed with Trichomonas 6 months ago when she had been in the ED with similar symptoms  Patient is currently sexually active with 1 female partner  Plan:  - reassured patient that there is very few long-term affects due to a Trichomonas infection, especially now that she is being treated  For further reassurance will do a Pap smear once she has been fully treated  Patient does not remember when her last Pap smear was, has been greater than 3 years  - advised patient that her partner should be treated as well  Prescribed additional metronidazole 1 time dose for partner  Diagnoses and all orders for this visit:    Trichimoniasis  -     metroNIDAZOLE (FLAGYL) 500 mg tablet; Take 1 tablet (500 mg total) by mouth every 8 (eight) hours for 1 day          Subjective:      Patient ID: Gabrielle Boss is a 43 y o  female  Gabrielle Boss this 15-year-old female presenting today for follow-up from the ED after Trichomonas diagnosis  Patient is being currently treated with metronidazole  Patient reports that she is having anxiety due to the fact that she has had this Trichomonas infection for over 6 months, as she had been to the ED in December and UA shows that there was trichomonas infection found  Patient is currently no longer having any symptoms is feeling much better than she was, does not report any discharge at this time  Patient would like to do a Pap smear to make sure that there is no effects Trichomonas Infection  Patient does not have any other concerns at this time        The following portions of the patient's history were reviewed and updated as appropriate: allergies, current medications, past family history, past medical history, past social history, past surgical history and problem list     Review of Systems   Constitutional: Negative for chills and fever  HENT: Negative for ear pain and sore throat  Eyes: Negative for pain and visual disturbance  Respiratory: Negative for cough and shortness of breath  Cardiovascular: Negative for chest pain and palpitations  Gastrointestinal: Negative for abdominal pain and vomiting  Genitourinary: Negative for decreased urine volume, difficulty urinating, dyspareunia, dysuria, frequency, hematuria, vaginal bleeding, vaginal discharge and vaginal pain  Musculoskeletal: Negative for arthralgias and back pain  Skin: Negative for color change and rash  Neurological: Negative for seizures and syncope  All other systems reviewed and are negative  Objective:      /60 (BP Location: Right arm, Patient Position: Sitting, Cuff Size: Standard)   Pulse 84   Temp (!) 96 8 °F (36 °C) (Temporal)   Resp 19   Ht 5' 11" (1 803 m)   Wt 70 kg (154 lb 6 4 oz)   SpO2 96%   BMI 21 53 kg/m²          Physical Exam  Constitutional:       Appearance: Normal appearance  HENT:      Head: Normocephalic and atraumatic  Eyes:      Extraocular Movements: Extraocular movements intact  Pupils: Pupils are equal, round, and reactive to light  Cardiovascular:      Rate and Rhythm: Normal rate and regular rhythm  Pulses: Normal pulses  Heart sounds: Normal heart sounds  Pulmonary:      Effort: Pulmonary effort is normal  No respiratory distress  Breath sounds: Normal breath sounds  No stridor  No wheezing, rhonchi or rales  Abdominal:      General: Bowel sounds are normal  There is no distension  Palpations: Abdomen is soft  Tenderness: There is no abdominal tenderness     Genitourinary:     Comments: Deferred at this time  Skin:     General: Skin is warm  Capillary Refill: Capillary refill takes less than 2 seconds  Neurological:      General: No focal deficit present  Mental Status: She is alert and oriented to person, place, and time  Psychiatric:         Mood and Affect: Mood is anxious

## 2022-06-18 PROBLEM — A59.9 TRICHIMONIASIS: Status: ACTIVE | Noted: 2022-06-18

## 2022-06-18 NOTE — ASSESSMENT & PLAN NOTE
Diagnosis by UA in ED 2 days ago  Patient reports that she was having some increased urination and abdominal pain and had gone to the emergency room  She was started on metronidazole for 14 days  GC/chlamydia was negative  Awaiting affirm test results  Patient had concerns about long-term affects of Trichomonas, and of not being told that she was diagnosed with Trichomonas 6 months ago when she had been in the ED with similar symptoms  Patient is currently sexually active with 1 female partner  Plan:  - reassured patient that there is very few long-term affects due to a Trichomonas infection, especially now that she is being treated  For further reassurance will do a Pap smear once she has been fully treated  Patient does not remember when her last Pap smear was, has been greater than 3 years  - advised patient that her partner should be treated as well  Prescribed additional metronidazole 1 time dose for partner

## 2022-07-12 ENCOUNTER — OFFICE VISIT (OUTPATIENT)
Dept: OBGYN CLINIC | Facility: CLINIC | Age: 42
End: 2022-07-12

## 2022-07-12 ENCOUNTER — APPOINTMENT (OUTPATIENT)
Dept: LAB | Facility: CLINIC | Age: 42
End: 2022-07-12
Payer: MEDICARE

## 2022-07-12 VITALS
BODY MASS INDEX: 21.03 KG/M2 | DIASTOLIC BLOOD PRESSURE: 69 MMHG | WEIGHT: 150.2 LBS | HEIGHT: 71 IN | SYSTOLIC BLOOD PRESSURE: 106 MMHG | HEART RATE: 82 BPM

## 2022-07-12 DIAGNOSIS — Z72.51 HIGH RISK SEXUAL BEHAVIOR, UNSPECIFIED TYPE: ICD-10-CM

## 2022-07-12 LAB
HBV SURFACE AG SER QL: NORMAL
HCV AB SER QL: NORMAL

## 2022-07-12 PROCEDURE — 86803 HEPATITIS C AB TEST: CPT

## 2022-07-12 PROCEDURE — 87389 HIV-1 AG W/HIV-1&-2 AB AG IA: CPT

## 2022-07-12 PROCEDURE — 87340 HEPATITIS B SURFACE AG IA: CPT

## 2022-07-12 PROCEDURE — 87661 TRICHOMONAS VAGINALIS AMPLIF: CPT | Performed by: OBSTETRICS & GYNECOLOGY

## 2022-07-12 PROCEDURE — 87563 M. GENITALIUM AMP PROBE: CPT | Performed by: OBSTETRICS & GYNECOLOGY

## 2022-07-12 PROCEDURE — 36415 COLL VENOUS BLD VENIPUNCTURE: CPT

## 2022-07-12 PROCEDURE — 86592 SYPHILIS TEST NON-TREP QUAL: CPT

## 2022-07-12 PROCEDURE — 99213 OFFICE O/P EST LOW 20 MIN: CPT | Performed by: OBSTETRICS & GYNECOLOGY

## 2022-07-12 NOTE — PROGRESS NOTES
Problem Visit     SUBJECTIVE:  CC:  HPI: Jen Bautista is a 43 y o  female who presents after trichomonas diagnosis  Patient reports that she presented to the ED in December with abdominal pain; she was diagnosed with a UTI  She presented again to the ED in last June with LLQ abdominal pain, and was informed that her records showed trichomonads in her urine as early as December  She denies abnormal vaginal itching, discomfort, or discharge  She is now s/p treatment with metronidazole  Reports one female sexual partner in last 6 months, 4-5 in lifetime, previously male  No other h/o STIs  Last pap 5/20/2020, NILM w/ neg HPV  Today, she is requesting additional evaluation to ensure she no longer has Trichomonas      Past Medical History:   Diagnosis Date    Anemia     iron def    Chronic pain disorder     back    Migraines     Pneumonia 2018    Seasonal allergies     [facility]     [facility] )     38 years ago       Past Surgical History:   Procedure Laterality Date    DC EXC TUMOR SOFT TISSUE THIGH/KNEE SUBFASC 5+CM Right 2/3/2020    Procedure: EXCISIONAL BIOPSY SOFT  TISSUE MASS RIGHT KNEE;  Surgeon: Maryellen Pressley MD;  Location: [address] [place] [address];  Service: Orthopedics    TUBAL LIGATION         Social History     Tobacco Use    Smoking status: Current Every Day Smoker     Packs/day: 0 25     Types: Cigarettes    Smokeless tobacco: Never Used   Vaping Use    Vaping Use: Never used   Substance Use Topics    Alcohol use: No    Drug use: No         Current Outpatient Medications:     cyclobenzaprine (FLEXERIL) 10 mg tablet, Take 1 tablet (10 mg total) by mouth daily at bedtime as needed for muscle spasms (Patient not taking: No sig reported), Disp: 30 tablet, Rfl: 1    methylPREDNISolone 4 MG tablet therapy pack, Use as directed on package (Patient not taking: No sig reported), Disp: 21 tablet, Rfl: 0      OBJECTIVE:  Vitals:    07/12/22 1145   BP: 106/69   Pulse: 82   Weight: 68 1 kg (150 lb 3 2 oz)   Height: 5' 11" (1 803 m)       Physical Exam  Constitutional:       General: She is not in acute distress  Appearance: Normal appearance  She is not ill-appearing  Eyes:      Extraocular Movements: Extraocular movements intact  Pulmonary:      Effort: Pulmonary effort is normal  No respiratory distress  Abdominal:      General: Abdomen is flat  There is no distension  Palpations: Abdomen is soft  There is no mass  Tenderness: There is no abdominal tenderness  There is no guarding or rebound  Musculoskeletal:         General: No swelling or tenderness  Skin:     Coloration: Skin is not pale  Findings: No erythema or rash  Neurological:      Mental Status: She is alert  Psychiatric:         Behavior: Behavior normal          Thought Content: Thought content normal          : Normal appearing external genitalia, vaginal mucosa, and cervix  Normal physiologic discharge present  No evidence of vaginal, cervical, or uterine bleeding  Nabothian cyst present on cervix from 12-1 o'clock  On bimanual exam, small, mobile uterus  No CMT  No evidence of adnexal masses  Microscopy: No clue cells, yeast, trichomonads   Nitrazine: pH 4 5    ASSESSMENT/PLAN:  Problem List        Cardiovascular and Mediastinum    Migraine with aura and without status migrainosus, not intractable    Overview     Last Assessment & Plan:   -Patient describes symptoms of classic migraine  -On average, occur 2 times per month   -Associated with an aura, photophobia, phonophobia  -Patient does not like to take pills  -Prescribed Maxalt dissolving tablets for abortive therapy  -Instructed patient to take 1 for signs of oncoming migraine  -Instructed patient she may repeat in 2 hours if needed; and another 2 hours after that if needed   -Not to exceed 3 doses in 24 hours  - Patient will follow-up in 3 months              Musculoskeletal and Integument    Cyst of skin and subcutaneous tissue       Other Chronic midline low back pain without sciatica    Encounter for gynecological examination with abnormal finding    History of intermenstrual bleeding    Iron deficiency anemia due to chronic blood loss    Overview     Last Assessment & Plan:   Patient has known history of iron deficiency and chronic blood loss anemia-   -Patient describes symptoms of menorrhagia; likely contributing  -Patient does not of the last time she had a CBC  -She reports having to go to the hospital when she was pregnant for anemia; was recommended that she get a blood transfusion at that time, which she had refused  -Obtain CBC with differential, ferritin levels; notify patient of results  -Recommended patient increase oral intake of iron; possible iron supplements if warranted           Menorrhagia with regular cycle    Soft tissue mass    Overview     Added automatically from request for surgery [id number]           Tobacco use    Overview     Last Assessment & Plan:   -Patient smoking 1/4 pack/day  -Patient reports that she is ready to quit  -She states that she has tried to quit before using the patches, and was unsuccessful  -She does state that she has been decreasing the amount that she smokes  -Patient does not like to take pills  -Referral to tobacco treatment program at follow-up appointment, can consider prescribing nicotine replacement therapy   -If no other options, can consider Chantix           Trichimoniasis          Adonis Gamez is a 43 y o  woman who presents to discuss recent trichomonas infection and re-testing today  Trichomonas vaginalis PCR collected today  Wet mount performed and negative for trichomonas  STI testing ordered for HIV, RPR, Hep B, and Hep C  Gonorrhea and chlamydia recently collected and negative      RTC as needed, or for annual      Liz Lopez MD   PGY-3, OBGYN  07/12/22 12:15 PM

## 2022-07-13 LAB
HIV 1+2 AB+HIV1 P24 AG SERPL QL IA: NORMAL
RPR SER QL: NORMAL

## 2022-07-14 LAB
M GENITALIUM DNA SPEC QL NAA+PROBE: NEGATIVE
T VAGINALIS DNA SPEC QL NAA+PROBE: NEGATIVE

## 2022-08-02 ENCOUNTER — TELEPHONE (OUTPATIENT)
Dept: OBGYN CLINIC | Facility: CLINIC | Age: 42
End: 2022-08-02

## 2022-09-01 ENCOUNTER — OFFICE VISIT (OUTPATIENT)
Dept: FAMILY MEDICINE CLINIC | Facility: CLINIC | Age: 42
End: 2022-09-01

## 2022-09-01 ENCOUNTER — APPOINTMENT (OUTPATIENT)
Dept: LAB | Facility: CLINIC | Age: 42
End: 2022-09-01
Payer: MEDICARE

## 2022-09-01 VITALS
RESPIRATION RATE: 16 BRPM | OXYGEN SATURATION: 97 % | SYSTOLIC BLOOD PRESSURE: 124 MMHG | WEIGHT: 151 LBS | TEMPERATURE: 98.7 F | DIASTOLIC BLOOD PRESSURE: 80 MMHG | HEART RATE: 62 BPM | HEIGHT: 71 IN | BODY MASS INDEX: 21.14 KG/M2

## 2022-09-01 DIAGNOSIS — R10.13 EPIGASTRIC PAIN: Primary | ICD-10-CM

## 2022-09-01 DIAGNOSIS — R10.13 EPIGASTRIC PAIN: ICD-10-CM

## 2022-09-01 LAB
ALBUMIN SERPL BCP-MCNC: 3.6 G/DL (ref 3.5–5)
ALP SERPL-CCNC: 69 U/L (ref 46–116)
ALT SERPL W P-5'-P-CCNC: 25 U/L (ref 12–78)
ANION GAP SERPL CALCULATED.3IONS-SCNC: 4 MMOL/L (ref 4–13)
AST SERPL W P-5'-P-CCNC: 23 U/L (ref 5–45)
BASOPHILS # BLD AUTO: 0.02 THOUSANDS/ΜL (ref 0–0.1)
BASOPHILS NFR BLD AUTO: 0 % (ref 0–1)
BILIRUB SERPL-MCNC: 0.45 MG/DL (ref 0.2–1)
BUN SERPL-MCNC: 6 MG/DL (ref 5–25)
CALCIUM SERPL-MCNC: 9.1 MG/DL (ref 8.3–10.1)
CHLORIDE SERPL-SCNC: 108 MMOL/L (ref 96–108)
CO2 SERPL-SCNC: 25 MMOL/L (ref 21–32)
CREAT SERPL-MCNC: 0.82 MG/DL (ref 0.6–1.3)
EOSINOPHIL # BLD AUTO: 0.12 THOUSAND/ΜL (ref 0–0.61)
EOSINOPHIL NFR BLD AUTO: 3 % (ref 0–6)
ERYTHROCYTE [DISTWIDTH] IN BLOOD BY AUTOMATED COUNT: 14.3 % (ref 11.6–15.1)
GFR SERPL CREATININE-BSD FRML MDRD: 88 ML/MIN/1.73SQ M
GLUCOSE P FAST SERPL-MCNC: 79 MG/DL (ref 65–99)
HCT VFR BLD AUTO: 43.5 % (ref 34.8–46.1)
HGB BLD-MCNC: 13.1 G/DL (ref 11.5–15.4)
IMM GRANULOCYTES # BLD AUTO: 0.02 THOUSAND/UL (ref 0–0.2)
IMM GRANULOCYTES NFR BLD AUTO: 0 % (ref 0–2)
LIPASE SERPL-CCNC: 111 U/L (ref 73–393)
LYMPHOCYTES # BLD AUTO: 1.86 THOUSANDS/ΜL (ref 0.6–4.47)
LYMPHOCYTES NFR BLD AUTO: 39 % (ref 14–44)
MCH RBC QN AUTO: 25.2 PG (ref 26.8–34.3)
MCHC RBC AUTO-ENTMCNC: 30.1 G/DL (ref 31.4–37.4)
MCV RBC AUTO: 84 FL (ref 82–98)
MONOCYTES # BLD AUTO: 0.3 THOUSAND/ΜL (ref 0.17–1.22)
MONOCYTES NFR BLD AUTO: 6 % (ref 4–12)
NEUTROPHILS # BLD AUTO: 2.45 THOUSANDS/ΜL (ref 1.85–7.62)
NEUTS SEG NFR BLD AUTO: 52 % (ref 43–75)
NRBC BLD AUTO-RTO: 0 /100 WBCS
PLATELET # BLD AUTO: 229 THOUSANDS/UL (ref 149–390)
PMV BLD AUTO: 11.6 FL (ref 8.9–12.7)
POTASSIUM SERPL-SCNC: 4.2 MMOL/L (ref 3.5–5.3)
PROT SERPL-MCNC: 8.2 G/DL (ref 6.4–8.4)
RBC # BLD AUTO: 5.19 MILLION/UL (ref 3.81–5.12)
SODIUM SERPL-SCNC: 137 MMOL/L (ref 135–147)
WBC # BLD AUTO: 4.77 THOUSAND/UL (ref 4.31–10.16)

## 2022-09-01 PROCEDURE — 83690 ASSAY OF LIPASE: CPT

## 2022-09-01 PROCEDURE — 80053 COMPREHEN METABOLIC PANEL: CPT

## 2022-09-01 PROCEDURE — 99213 OFFICE O/P EST LOW 20 MIN: CPT | Performed by: FAMILY MEDICINE

## 2022-09-01 PROCEDURE — 85025 COMPLETE CBC W/AUTO DIFF WBC: CPT

## 2022-09-01 PROCEDURE — 36415 COLL VENOUS BLD VENIPUNCTURE: CPT

## 2022-09-01 RX ORDER — PANTOPRAZOLE SODIUM 40 MG/1
40 TABLET, DELAYED RELEASE ORAL
Qty: 30 TABLET | Refills: 5 | Status: SHIPPED | OUTPATIENT
Start: 2022-09-01

## 2022-09-01 NOTE — ASSESSMENT & PLAN NOTE
Will empirically treat with Protonix 40 mg daily  Will get a right upper quadrant ultrasound to evaluate for cholelithiasis      -follow-up CBC CMP and lipase   - will refer to Gastroenterology for endoscopy

## 2022-09-01 NOTE — PROGRESS NOTES
Assessment/Plan:    Epigastric pain  Will empirically treat with Protonix 40 mg daily  Will get a right upper quadrant ultrasound to evaluate for cholelithiasis  -follow-up CBC CMP and lipase   - will refer to Gastroenterology for endoscopy       Diagnoses and all orders for this visit:    Epigastric pain  -     pantoprazole (PROTONIX) 40 mg tablet; Take 1 tablet (40 mg total) by mouth daily before breakfast  -     US right upper quadrant with liver dopplers; Future  -     CBC and differential; Future  -     Comprehensive metabolic panel; Future  -     Lipase; Future  -     Ambulatory Referral to Gastroenterology; Future          Subjective:      Patient ID: Rubio Richardson is a 43 y o  female  This is a very pleasant 19-year-old female who presents to the clinic for evaluation of epigastric pain  Patient has been experiencing epigastric pain for a little over 1 year now  Epigastric pain has been associated with eating food approximately 1-2 hours afterward she experiences the pain  This happens almost every single day  Patient already avoids foods that have dairy as she is lactose intolerant  Associated symptoms include nausea and vomiting and very rarely diarrhea  Patient does not routinely use NSAIDs or drink alcohol  Patient does smoke cigarettes however she is motivated to quit  The following portions of the patient's history were reviewed and updated as appropriate: allergies, current medications, past family history, past medical history, past social history, past surgical history and problem list     Review of Systems   Constitutional: Negative for chills and fever  HENT: Negative for congestion and sore throat  Eyes: Negative for visual disturbance  Respiratory: Negative for wheezing  Cardiovascular: Negative for chest pain  Gastrointestinal: Positive for abdominal pain  Negative for blood in stool and nausea     Endocrine: Negative for cold intolerance and heat intolerance  Genitourinary: Negative for dysuria and hematuria  Musculoskeletal: Negative for gait problem  Skin: Negative for rash  Allergic/Immunologic: Negative for environmental allergies  Neurological: Negative for syncope  Hematological: Does not bruise/bleed easily  Psychiatric/Behavioral: Negative for behavioral problems  Objective:      /80 (BP Location: Left arm, Patient Position: Sitting, Cuff Size: Standard)   Pulse 62   Temp 98 7 °F (37 1 °C) (Temporal)   Resp 16   Ht 5' 11" (1 803 m)   Wt 68 5 kg (151 lb)   LMP 08/26/2022 (Approximate)   SpO2 97%   Breastfeeding No   BMI 21 06 kg/m²          Physical Exam  Vitals reviewed  Constitutional:       General: She is not in acute distress  Appearance: She is well-developed  She is not diaphoretic  HENT:      Head: Normocephalic and atraumatic  Right Ear: External ear normal       Left Ear: External ear normal       Nose: Nose normal    Eyes:      General:         Right eye: No discharge  Left eye: No discharge  Conjunctiva/sclera: Conjunctivae normal       Pupils: Pupils are equal, round, and reactive to light  Neck:      Thyroid: No thyromegaly  Vascular: No JVD  Trachea: No tracheal deviation  Cardiovascular:      Rate and Rhythm: Normal rate and regular rhythm  Heart sounds: Normal heart sounds  No murmur heard  No friction rub  No gallop  Pulmonary:      Effort: Pulmonary effort is normal  No respiratory distress  Breath sounds: Normal breath sounds  No wheezing  Chest:      Chest wall: No tenderness  Abdominal:      General: Bowel sounds are normal  There is no distension  Palpations: Abdomen is soft  Tenderness: There is no abdominal tenderness  There is no rebound  Musculoskeletal:         General: No tenderness  Normal range of motion  Cervical back: Normal range of motion and neck supple  Skin:     General: Skin is warm and dry  Findings: No erythema or rash  Neurological:      Mental Status: She is alert and oriented to person, place, and time  Coordination: Coordination normal       Deep Tendon Reflexes: Reflexes are normal and symmetric  Psychiatric:         Behavior: Behavior normal          Thought Content:  Thought content normal

## 2022-09-11 ENCOUNTER — HOSPITAL ENCOUNTER (OUTPATIENT)
Dept: ULTRASOUND IMAGING | Facility: HOSPITAL | Age: 42
Discharge: HOME | End: 2022-09-11
Attending: FAMILY MEDICINE
Payer: MEDICARE

## 2022-09-11 DIAGNOSIS — R10.13 EPIGASTRIC PAIN: ICD-10-CM

## 2022-09-11 PROCEDURE — 76705 ECHO EXAM OF ABDOMEN: CPT

## 2022-10-06 ENCOUNTER — TELEPHONE (OUTPATIENT)
Dept: OBGYN CLINIC | Facility: HOSPITAL | Age: 42
End: 2022-10-06

## 2022-10-06 ENCOUNTER — HOSPITAL ENCOUNTER (EMERGENCY)
Facility: HOSPITAL | Age: 42
Discharge: HOME | End: 2022-10-06
Attending: EMERGENCY MEDICINE
Payer: MEDICARE

## 2022-10-06 VITALS
HEART RATE: 65 BPM | RESPIRATION RATE: 20 BRPM | SYSTOLIC BLOOD PRESSURE: 114 MMHG | WEIGHT: 148.37 LBS | OXYGEN SATURATION: 100 % | BODY MASS INDEX: 20.69 KG/M2 | DIASTOLIC BLOOD PRESSURE: 68 MMHG | TEMPERATURE: 98.9 F

## 2022-10-06 DIAGNOSIS — M62.838 MUSCLE SPASM: Primary | ICD-10-CM

## 2022-10-06 DIAGNOSIS — M25.512 SHOULDER PAIN, LEFT: ICD-10-CM

## 2022-10-06 PROCEDURE — 99284 EMERGENCY DEPT VISIT MOD MDM: CPT | Performed by: EMERGENCY MEDICINE

## 2022-10-06 PROCEDURE — 99283 EMERGENCY DEPT VISIT LOW MDM: CPT

## 2022-10-06 PROCEDURE — 96372 THER/PROPH/DIAG INJ SC/IM: CPT

## 2022-10-06 RX ORDER — CYCLOBENZAPRINE HCL 10 MG
10 TABLET ORAL ONCE
Status: COMPLETED | OUTPATIENT
Start: 2022-10-06 | End: 2022-10-06

## 2022-10-06 RX ORDER — TRAMADOL HYDROCHLORIDE 50 MG/1
50 TABLET ORAL EVERY 6 HOURS PRN
Qty: 12 TABLET | Refills: 0 | Status: SHIPPED | OUTPATIENT
Start: 2022-10-06 | End: 2022-10-09

## 2022-10-06 RX ORDER — KETOROLAC TROMETHAMINE 30 MG/ML
15 INJECTION, SOLUTION INTRAMUSCULAR; INTRAVENOUS ONCE
Status: COMPLETED | OUTPATIENT
Start: 2022-10-06 | End: 2022-10-06

## 2022-10-06 RX ADMIN — CYCLOBENZAPRINE HYDROCHLORIDE 10 MG: 10 TABLET, FILM COATED ORAL at 11:44

## 2022-10-06 RX ADMIN — KETOROLAC TROMETHAMINE 15 MG: 30 INJECTION, SOLUTION INTRAMUSCULAR; INTRAVENOUS at 11:45

## 2022-10-06 NOTE — TELEPHONE ENCOUNTER
Patient calling back with update from ER  Patient was given an out of work for 2 days, sling, and Toradol RX for pain and inflammation  No xray was given today as  did not feel it would show anything  She has an appt with you on 10/18  Do you want to bring her in sooner or have [facility] see her sooner?

## 2022-10-06 NOTE — TELEPHONE ENCOUNTER
Caller: Li Flanagan    Doctor: Susy Hammond    Reason for call: Patient is calling back to check status of previous message  She is in severe pain and would like a call back ASAP      Call back#:[phone]

## 2022-10-06 NOTE — TELEPHONE ENCOUNTER
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

## 2022-10-06 NOTE — TELEPHONE ENCOUNTER
Caller: Caterina Bangura    Doctor: Dr Nate Banks    Reason for call: return call to see if she can be seen sooner than 10/18      Call back#: [phone]

## 2022-10-06 NOTE — TELEPHONE ENCOUNTER
Did speak to patient in a duplicate note in which did recommend ER   Will check for other physician availability [facility] appt not available

## 2022-10-06 NOTE — TELEPHONE ENCOUNTER
Spoke with patient and offered a Dr Shelia Klein for noon today  She declined as she went to ER for severe pain and swelling to back of arm  She will be intouch with ER findings

## 2022-10-06 NOTE — TELEPHONE ENCOUNTER
Caller: Alvaro Bryson     Doctor/Office: Kendall Fraser [phone]     Location of pain: Left shoulder    Pain scale: 10/10    Duration of pain:

## 2022-10-06 NOTE — TELEPHONE ENCOUNTER
Caller: Michelle Darling    Doctor: Samson Borjas    Reason for call: shoulder pain    Call back#: [phone]

## 2022-10-06 NOTE — Clinical Note
Adonis Gamez was seen and treated in our emergency department on 10/6/2022  Diagnosis:     Tasha Ríos    She may return on this date: 10/09/2022         If you have any questions or concerns, please don't hesitate to call        Valeri Lau DO    ______________________________           _______________          _______________  Hospital Representative                              Date                                Time

## 2022-10-06 NOTE — TELEPHONE ENCOUNTER
Please address  Patient may be scheduled with another physician today with an opening or recommended to go to urgent care or ER

## 2022-10-07 NOTE — ED PROVIDER NOTES
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 Taking? pantoprazole (PROTONIX) 40 mg tablet   No No   Sig: Take 1 tablet (40 mg total) by mouth daily before breakfast      Facility-Administered Medications: None       Past Medical History:   Diagnosis Date    Anemia     iron def    Chronic pain disorder     back    Migraines     Pneumonia 2018    Seasonal allergies     [facility]izure [facility] [address] [facility]     Seizures ([facility] [address] )     38 years ago       Past Surgical History:   Procedure Laterality Date    ME EXC TUMOR SOFT TISSUE THIGH/KNEE SUBFASC 5+CM Right 2/3/2020    Procedure: EXCISIONAL BIOPSY SOFT  TISSUE MASS RIGHT KNEE;  Surgeon: Lam Chen MD;  Location: [facility] MAIN OR;  Service: Orthopedics    TUBAL LIGATION         Family History   Problem Relation Age of Onset    Heart disease Mother     Hypertension Mother      I have reviewed and agree with the history as documented      E-Cigarette/Vaping    E-Cigarette Use Never User      E-Cigarette/Vaping Substances    Nicotine No     THC No     CBD No     Flavoring No     Other No     Unknown No      Social History     Tobacco Use    Smoking status: Current Every Day Smoker     Packs/day: 0 25     Types: Cigarettes  Smokeless tobacco: Never Used   Vaping Use    Vaping Use: Never used   Substance Use Topics    Alcohol use: No    Drug use: No       Review of Systems   Constitutional: Negative  Negative for chills and fever  HENT: Negative  Negative for rhinorrhea, sore throat, trouble swallowing and voice change  Eyes: Negative  Negative for pain and visual disturbance  Respiratory: Negative  Negative for cough, chest tightness, shortness of breath and wheezing  Cardiovascular: Negative  Negative for chest pain and palpitations  Gastrointestinal: Negative for abdominal pain, diarrhea, nausea and vomiting  Genitourinary: Negative  Negative for dysuria and frequency  Musculoskeletal: Positive for arthralgias  Negative for neck pain and neck stiffness  Skin: Negative  Negative for rash  Neurological: Negative  Negative for dizziness, speech difficulty, weakness, light-headedness and numbness  Physical Exam  Physical Exam  Vitals and nursing note reviewed  Constitutional:       General: She is not in acute distress  Appearance: She is well-developed  HENT:      Head: Normocephalic and atraumatic  Eyes:      Conjunctiva/sclera: Conjunctivae normal       Pupils: Pupils are equal, round, and reactive to light  Neck:      Trachea: No tracheal deviation  Cardiovascular:      Rate and Rhythm: Normal rate and regular rhythm  Pulmonary:      Effort: Pulmonary effort is normal  No respiratory distress  Breath sounds: Normal breath sounds  No wheezing or rales  Abdominal:      General: Bowel sounds are normal  There is no distension  Palpations: Abdomen is soft  Tenderness: There is no abdominal tenderness  There is no guarding or rebound  Musculoskeletal:         General: No tenderness or deformity  Normal range of motion  Arms:       Cervical back: Normal range of motion and neck supple  Skin:     General: Skin is warm and dry        Capillary Refill: Capillary refill takes less than 2 seconds  Findings: No rash  Neurological:      Mental Status: She is alert and oriented to person, place, and time  Psychiatric:         Behavior: Behavior normal          Vital Signs  ED Triage Vitals [10/06/22 1116]   Temperature Pulse Respirations Blood Pressure SpO2   98 9 °F (37 2 °C) 65 20 114/68 100 %      Temp Source Heart Rate Source Patient Position - Orthostatic VS BP Location FiO2 (%)   Tympanic Monitor -- -- --      Pain Score       --           Vitals:    10/06/22 1116   BP: 114/68   Pulse: 65         Visual Acuity      ED Medications  Medications   ketorolac (TORADOL) injection 15 mg (15 mg Intramuscular Given 10/6/22 1145)   cyclobenzaprine (FLEXERIL) tablet 10 mg (10 mg Oral Given 10/6/22 1144)       Diagnostic Studies  Results Reviewed     None                 No orders to display              Procedures  Procedures         ED Course                                             MDM  Number of Diagnoses or Management Options  Muscle spasm  Shoulder pain, left  Diagnosis management comments: Offered x-ray in the emergency department, patient declined  Pain control, sling applied  I have reviewed the patient's visit and any testing done in the emergency department  They have verbalized their understanding of any testing done today and have no further questions or concerns regarding their care in the emergency room  They will follow up with their primary care physician as well as with any specialist in their discharge instructions  Strict return precautions were discussed        Disposition  Final diagnoses:   Muscle spasm   Shoulder pain, left     Time reflects when diagnosis was documented in both MDM as applicable and the Disposition within this note     Time User Action Codes Description Comment    [facility] 11:[facility] 154[facility] Muscle spasm     10/6/2022 11:30 AM [facility] Add [M25 512] Shoulder pain, left       ED Disposition     ED Disposition   Discharge    Condition   Stable    Date/Time   Thu Oct 6, 2022 11:30 AM    Comment   Cy Hurtado discharge to home/self care  Follow-up Information     Follow up With Specialties Details Why Contact Info Additional Information    Hyacinth Mckeon MD Family Medicine   [address]  [address] [place] [address]       [address] Orthopedic Surgery   [address]  [place] [address] [facility] [address] [phone] [address]  [address], [address], [address] [facility] [place], [place], [address] [facility], [id number] [phone]          Discharge Medication List as of 10/6/2022 11:45 AM      START taking these medications    Details   traMADol (ULTRAM) 50 mg tablet Take 1 tablet (50 mg total) by mouth every 6 (six) hours as needed for severe pain for up to 3 days, Starting Thu 10/6/2022, Until Sun 10/9/2022 at 2359, Normal         CONTINUE these medications which have NOT CHANGED    Details   pantoprazole (PROTONIX) 40 mg tablet Take 1 tablet (40 mg total) by mouth daily before breakfast, Starting Thu 9/1/2022, Normal             No discharge procedures on file      PDMP Review       Value Time User    PDMP Reviewed  Yes 2/4/2020 10:31 AM Sanjuanita Young MD          ED Provider  Electronically Signed by           Ned Matthews DO  10/06/22 1623

## 2022-11-28 ENCOUNTER — HOSPITAL ENCOUNTER (EMERGENCY)
Facility: HOSPITAL | Age: 42
Discharge: HOME | End: 2022-11-28
Attending: EMERGENCY MEDICINE

## 2022-11-28 VITALS
OXYGEN SATURATION: 97 % | SYSTOLIC BLOOD PRESSURE: 116 MMHG | TEMPERATURE: 97.7 F | DIASTOLIC BLOOD PRESSURE: 57 MMHG | HEART RATE: 70 BPM | RESPIRATION RATE: 20 BRPM

## 2022-11-28 DIAGNOSIS — R10.84 GENERALIZED ABDOMINAL PAIN: Primary | ICD-10-CM

## 2022-11-28 DIAGNOSIS — K80.20 CHOLELITHIASIS: ICD-10-CM

## 2022-11-28 LAB
ALBUMIN SERPL BCP-MCNC: 3.8 G/DL (ref 3.5–5)
ALP SERPL-CCNC: 70 U/L (ref 46–116)
ALT SERPL W P-5'-P-CCNC: 26 U/L (ref 12–78)
ANION GAP SERPL CALCULATED.3IONS-SCNC: 8 MMOL/L (ref 4–13)
BASOPHILS # BLD AUTO: 0.03 THOUSANDS/ÂΜL (ref 0–0.1)
BASOPHILS NFR BLD AUTO: 1 % (ref 0–1)
BILIRUB SERPL-MCNC: 0.22 MG/DL (ref 0.2–1)
BUN SERPL-MCNC: 11 MG/DL (ref 5–25)
CALCIUM SERPL-MCNC: 9.7 MG/DL (ref 8.3–10.1)
CHLORIDE SERPL-SCNC: 103 MMOL/L (ref 96–108)
CO2 SERPL-SCNC: 29 MMOL/L (ref 21–32)
CREAT SERPL-MCNC: 0.84 MG/DL (ref 0.6–1.3)
EOSINOPHIL # BLD AUTO: 0.12 THOUSAND/ÂΜL (ref 0–0.61)
EOSINOPHIL NFR BLD AUTO: 2 % (ref 0–6)
ERYTHROCYTE [DISTWIDTH] IN BLOOD BY AUTOMATED COUNT: 14.4 % (ref 11.6–15.1)
GFR SERPL CREATININE-BSD FRML MDRD: 86 ML/MIN/1.73SQ M
GLUCOSE SERPL-MCNC: 103 MG/DL (ref 65–140)
HCT VFR BLD AUTO: 42.1 % (ref 34.8–46.1)
HGB BLD-MCNC: 13 G/DL (ref 11.5–15.4)
IMM GRANULOCYTES # BLD AUTO: 0.01 THOUSAND/UL (ref 0–0.2)
IMM GRANULOCYTES NFR BLD AUTO: 0 % (ref 0–2)
LIPASE SERPL-CCNC: 211 U/L (ref 73–393)
LYMPHOCYTES # BLD AUTO: 2.07 THOUSANDS/ÂΜL (ref 0.6–4.47)
LYMPHOCYTES NFR BLD AUTO: 32 % (ref 14–44)
MCH RBC QN AUTO: 25.2 PG (ref 26.8–34.3)
MCHC RBC AUTO-ENTMCNC: 30.9 G/DL (ref 31.4–37.4)
MCV RBC AUTO: 82 FL (ref 82–98)
MONOCYTES # BLD AUTO: 0.39 THOUSAND/ÂΜL (ref 0.17–1.22)
MONOCYTES NFR BLD AUTO: 6 % (ref 4–12)
NEUTROPHILS # BLD AUTO: 3.83 THOUSANDS/ÂΜL (ref 1.85–7.62)
NEUTS SEG NFR BLD AUTO: 59 % (ref 43–75)
NRBC BLD AUTO-RTO: 0 /100 WBCS
PLATELET # BLD AUTO: 248 THOUSANDS/UL (ref 149–390)
PMV BLD AUTO: 10.9 FL (ref 8.9–12.7)
POTASSIUM SERPL-SCNC: 3.3 MMOL/L (ref 3.5–5.3)
PROT SERPL-MCNC: 8 G/DL (ref 6.4–8.4)
RBC # BLD AUTO: 5.16 MILLION/UL (ref 3.81–5.12)
SODIUM SERPL-SCNC: 140 MMOL/L (ref 135–147)
WBC # BLD AUTO: 6.45 THOUSAND/UL (ref 4.31–10.16)

## 2022-11-28 RX ORDER — PANTOPRAZOLE SODIUM 40 MG/1
40 TABLET, DELAYED RELEASE ORAL DAILY
Qty: 30 TABLET | Refills: 0 | Status: SHIPPED | OUTPATIENT
Start: 2022-11-28 | End: 2022-11-29

## 2022-11-28 NOTE — DISCHARGE INSTRUCTIONS
You were seen in the emergency department today for generalized abdominal pain  Laboratory analysis did not reveal any acute abnormalities  Please follow-up with your primary care physician regarding your symptoms  Return to the Emergency Department sooner if increased pain, fever, vomiting, diarrhea, difficulty breathing or urinating, bleeding  Low-fat diet as above  Follow-up with gastroenterology as well as surgery    Protonix sent to your pharmacy

## 2022-11-28 NOTE — ED PROVIDER NOTES
History  Chief Complaint   Patient presents with   • Abdominal Pain     Pt with abd pain for a few months pain worse today     Su Amaro is a 43 y o   female with PMH of anemia, migraines, chronic pain disorder who presents to the emergency department with generalized abdominal pain  Patient states for the last year she has had constant chronic generalized abdominal pain  Notes it is worse approximately 40 minutes after eating  Worse in the right upper quadrant  She states after approximately 2 or 3 hours the right upper quadrant pain resolves  Some associated nausea but no episodes of vomiting  No diarrhea  No constipation  No urinary complaints including frequency, urgency, dysuria or hematuria  No vaginal bleeding or vaginal discharge  Is sexually active, tubes are tied  No lower abdominal pain  No chest pain, shortness of breath, palpitations, dizziness or lightheadedness  No fevers, chills, sweats  States she was seen by her PCP for this in September was sent for right upper quadrant, given GI follow-up as well as Protonix  She states Protonix did seem to help hours she is out of it, she never follow-up with GI, and never heard about the results of her right upper quadrant upset ultrasound  Patient is currently asymptomatic  History provided by:  Patient   used: No    Abdominal Pain  Pain location:  RUQ  Pain quality: aching and cramping    Pain radiates to:  Does not radiate  Pain severity:  Moderate  Onset quality:  Gradual  Duration:  2 hours  Timing:  Sporadic  Progression:  Resolved  Chronicity:  Recurrent  Context: eating    Context: not alcohol use, not awakening from sleep, not diet changes, not laxative use, not medication withdrawal, not previous surgeries, not recent illness, not recent sexual activity, not recent travel, not retching, not sick contacts, not suspicious food intake and not trauma    Relieved by: Time, Protonix    Exacerbated by: Eating  Ineffective treatments:  None tried  Associated symptoms: anorexia and nausea    Associated symptoms: no belching, no chest pain, no chills, no constipation, no cough, no diarrhea, no dysuria, no fatigue, no fever, no flatus, no hematemesis, no hematochezia, no hematuria, no melena, no shortness of breath, no sore throat, no vaginal bleeding, no vaginal discharge and no vomiting        Prior to Admission Medications   Prescriptions Last Dose Informant Patient Reported? Taking? pantoprazole (PROTONIX) 40 mg tablet   No No   Sig: Take 1 tablet (40 mg total) by mouth daily before breakfast      Facility-Administered Medications: None       Past Medical History:   Diagnosis Date   • Anemia     iron def   • Chronic pain disorder     back   • Migraines    • Pneumonia 2018   • Seasonal allergies    • Seizure ([facility] [address] )    • [facility]     38 years ago       Past Surgical History:   Procedure Laterality Date   • [place] EXC TUMOR SOFT TISSUE THIGH/KNEE SUBFASC 5+CM Right 2/3/2020    Procedure: EXCISIONAL BIOPSY SOFT  TISSUE MASS RIGHT KNEE;  Surgeon: Sheri Benavides MD;  Location: [address] [place] [address];  Service: Orthopedics   • TUBAL LIGATION         Family History   Problem Relation Age of Onset   • Heart disease Mother    • Hypertension Mother      I have reviewed and agree with the history as documented  E-Cigarette/Vaping   • E-Cigarette Use Never User      E-Cigarette/Vaping Substances   • Nicotine No    • THC No    • CBD No    • Flavoring No    • Other No    • Unknown No      Social History     Tobacco Use   • Smoking status: Every Day     Packs/day: 0 25     Types: Cigarettes   • Smokeless tobacco: Never   Vaping Use   • Vaping Use: Never used   Substance Use Topics   • Alcohol use: No   • Drug use: No       Review of Systems   Constitutional: Negative for activity change, appetite change, chills, diaphoresis, fatigue, fever and unexpected weight change     HENT: Negative for congestion, dental problem, ear pain, mouth sores, nosebleeds, sinus pressure, sinus pain, sneezing, sore throat and trouble swallowing  Respiratory: Negative for apnea, cough, choking, chest tightness, shortness of breath, wheezing and stridor  Cardiovascular: Negative for chest pain, palpitations and leg swelling  Gastrointestinal: Positive for abdominal pain, anorexia and nausea  Negative for constipation, diarrhea, flatus, hematemesis, hematochezia, melena and vomiting  Genitourinary: Negative for dysuria, flank pain, frequency, hematuria, urgency, vaginal bleeding and vaginal discharge  Musculoskeletal: Negative for arthralgias, joint swelling and myalgias  Skin: Negative for color change, pallor and rash  Neurological: Negative for dizziness, tremors, seizures, syncope, speech difficulty, weakness, light-headedness, numbness and headaches  All other systems reviewed and are negative  Physical Exam  Physical Exam  Vitals and nursing note reviewed  Constitutional:       General: She is not in acute distress  Appearance: Normal appearance  She is normal weight  She is not ill-appearing or toxic-appearing  HENT:      Head: Normocephalic and atraumatic  Mouth/Throat:      Mouth: Mucous membranes are moist       Pharynx: Oropharynx is clear  Eyes:      Extraocular Movements: Extraocular movements intact  Pupils: Pupils are equal, round, and reactive to light  Cardiovascular:      Rate and Rhythm: Normal rate and regular rhythm  Pulses: Normal pulses  Heart sounds: Normal heart sounds  No murmur heard  No friction rub  No gallop  Pulmonary:      Effort: Pulmonary effort is normal  No respiratory distress  Breath sounds: Normal breath sounds  No stridor  No wheezing, rhonchi or rales  Abdominal:      General: Abdomen is flat  Bowel sounds are normal  There is no distension  Palpations: Abdomen is soft  There is no mass  Tenderness: There is no abdominal tenderness   There is no right CVA tenderness, left CVA tenderness, guarding or rebound  Negative signs include Davila's sign, Rovsing's sign, McBurney's sign and psoas sign  Hernia: No hernia is present  Comments: Abdomen is soft, nontender, nondistended without rebound or guarding  Musculoskeletal:         General: No swelling, tenderness, deformity or signs of injury  Normal range of motion  Cervical back: Normal range of motion  No rigidity or tenderness  Right lower leg: No edema  Left lower leg: No edema  Skin:     General: Skin is warm and dry  Capillary Refill: Capillary refill takes less than 2 seconds  Neurological:      General: No focal deficit present  Mental Status: She is alert and oriented to person, place, and time  Mental status is at baseline  Cranial Nerves: No cranial nerve deficit  Sensory: No sensory deficit  Motor: No weakness        Coordination: Coordination normal          Vital Signs  ED Triage Vitals   Temperature Pulse Respirations Blood Pressure SpO2   11/28/22 0133 11/28/22 0133 11/28/22 0133 11/28/22 0133 11/28/22 0133   97 7 °F (36 5 °C) 89 20 120/83 100 %      Temp Source Heart Rate Source Patient Position - Orthostatic VS BP Location FiO2 (%)   11/28/22 0133 11/28/22 0347 11/28/22 0133 11/28/22 0133 --   Oral Monitor Sitting Right arm       Pain Score       11/28/22 0133       10 - Worst Possible Pain           Vitals:    11/28/22 0133 11/28/22 0347   BP: 120/83 116/57   Pulse: 89 70   Patient Position - Orthostatic VS: Sitting Lying         Visual Acuity      ED Medications  Medications - No data to display    Diagnostic Studies  Results Reviewed     Procedure Component Value Units Date/Time    Comprehensive metabolic panel [[id number]]  (Abnormal) Collected: 11/28/22 0147    Lab Status: Final result Specimen: Blood from Arm, Right Updated: 11/28/22 0223     Sodium 140 mmol/L      Potassium 3 3 mmol/L      Chloride 103 mmol/L      CO2 29 mmol/L ANION GAP 8 mmol/L      BUN 11 mg/dL      Creatinine 0 84 mg/dL      Glucose 103 mg/dL      Calcium 9 7 mg/dL      AST --     ALT 26 U/L      Alkaline Phosphatase 70 U/L      Total Protein 8 0 g/dL      Albumin 3 8 g/dL      Total Bilirubin 0 22 mg/dL      eGFR 86 ml/min/1 73sq m     Narrative:      National Kidney Disease Foundation guidelines for Chronic Kidney Disease (CKD):   •  Stage 1 with normal or high GFR (GFR > 90 mL/min/1 73 square meters)  •  Stage 2 Mild CKD (GFR = 60-89 mL/min/1 73 square meters)  •  Stage 3A Moderate CKD (GFR = 45-59 mL/min/1 73 square meters)  •  Stage 3B Moderate CKD (GFR = 30-44 mL/min/1 73 square meters)  •  Stage 4 Severe CKD (GFR = 15-29 mL/min/1 73 square meters)  •  Stage 5 End Stage CKD (GFR <15 mL/min/1 73 square meters)  Note: GFR calculation is accurate only with a steady state creatinine    Lipase [[id number]]  (Normal) Collected: 11/28/22 0147    Lab Status: Final result Specimen: Blood from Arm, Right Updated: 11/28/22 0223     Lipase 211 u/L     CBC and differential [[id number]]  (Abnormal) Collected: 11/28/22 0147    Lab Status: Final result Specimen: Blood from Arm, Right Updated: 11/28/22 0204     WBC 6 45 Thousand/uL      RBC 5 16 Million/uL      Hemoglobin 13 0 g/dL      Hematocrit 42 1 %      MCV 82 fL      MCH 25 2 pg      MCHC 30 9 g/dL      RDW 14 4 %      MPV 10 9 fL      Platelets 568 Thousands/uL      nRBC 0 /100 WBCs      Neutrophils Relative 59 %      Immat GRANS % 0 %      Lymphocytes Relative 32 %      Monocytes Relative 6 %      Eosinophils Relative 2 %      Basophils Relative 1 %      Neutrophils Absolute 3 83 Thousands/µL      Immature Grans Absolute 0 01 Thousand/uL      Lymphocytes Absolute 2 07 Thousands/µL      Monocytes Absolute 0 39 Thousand/µL      Eosinophils Absolute 0 12 Thousand/µL      Basophils Absolute 0 03 Thousands/µL                  No orders to display              Procedures  Procedures         ED Course MDM  Number of Diagnoses or Management Options  Cholelithiasis: established and worsening  Generalized abdominal pain: new and requires workup  Diagnosis management comments: Patient was seen and examined  in the emergency department for chief complaint of generalized cramping abdominal pain worse in the right upper quadrant for the last year  The patient presented episodic abdominal pain right upper quadrant worse after eating  Occurs approximately 40 minutes after eating and lasts about 2 hours and then improves  Was given Protonix in the past which seemed to help however not resolved  Right upper quadrant the past showed cholelithiasis  Patient just here to find out what is going on  Unable to follow-up with GI  Currently asymptomatic  On exam patient is well-appearing no acute distress, lungs are clear, regular rate rhythm, no murmurs rubs or gallops  Abdomen is soft, nontender, nondistended negative Davila sign  No rebound or guarding  No rashes or lesions  No urinary complaints  Marleta Press Marleta Press DDx including but not limited to: gastroenteritis, gastritis, PUD, GERD, gastroparesis, hepatitis, pancreatitis, colitis, enteritis,mesenteric adenitis, epiploic appendagitis, mesenteric panniculitis,, IBD, IBS, ileus, , volvulus, cholecystitis, biliary colic, choledocholithiasis, constipation, pelvic pathology, renal colic, pyelonephritis; doubt cardiac etiology  Workup:  Labs from triage reviewed  No leukocytosis  Renal function intact  No transaminitis or obstructive pattern  Patient is having no urinary symptoms, tubes are tied  Being the patient is asymptomatic had long discussion with the patient  Based on her symptoms likely PUD, gastritis, GERD, or biliary colic  Symptoms sound very much like biliary colic especially with patient's history of cholelithiasis on ultrasound  I did offer CT imaging as well as urinalysis and urine pregnancy    We had a long discussion about risks versus benefit non the patient is asymptomatic in symptoms have been ongoing for your  Shared decision making was employed and we decided to skip the CT and have patient follow-up with GI as well as General surgery  We discussed strict return precautions and if pain worsens or continues she should return  Discussed that I am not taking looking the abdomen with a CT but being that she is asymptomatic at this point feel that is appropriate  Patient expressed understanding and appreciation  She will follow-up as an outpatient as previously recommended  Do not feel patient needs pregnancy test at tubes are tied, no urinary symptoms to suggest need for urinalysis  No cardiac symptoms to suggest need for cardiac workup  Disposition:  General impression 26-year-old female with generalized abdominal pain worse in the right upper quadrant  Symptoms likely secondary to biliary colic  Follow-up with general surgery as well as Gastroenterology as previously recommended  Phone numbers given again  Will refill Protonix as patient states that helped her in the past   All questions answered  Strict return precautions discussed  The patient was discharged in stable condition  Patient ambulated off the department  Extensive return to emergency department precautions were discussed  Follow up with appropriate providers including primary care physician was discussed  Patient and/or their  primary decision maker expressed understanding  Patient remained stable during entire emergency department stay           Amount and/or Complexity of Data Reviewed  Clinical lab tests: ordered and reviewed  Review and summarize past medical records: yes  Independent visualization of images, tracings, or specimens: yes    Risk of Complications, Morbidity, and/or Mortality  Presenting problems: moderate  Diagnostic procedures: low  Management options: moderate    Patient Progress  Patient progress: improved      Disposition  Final diagnoses:   Generalized abdominal pain   Cholelithiasis     Time reflects when diagnosis was documented in both MDM as applicable and the Disposition within this note     Time User Action Codes Description Comment    11/28/2022  4:16 AM Eduardo Pereira [R10 84] Generalized abdominal pain     11/28/2022  4:16 AM Eduardo Garces Add [K80 20] Cholelithiasis       ED Disposition     ED Disposition   Discharge    Condition   Stable    Date/Time   Mon Nov 28, 2022  4:16 AM    Comment   Tracy Klinefelter discharge to home/self care  Follow-up Information     Follow up With Specialties Details Why [address] [facility] Emergency Department Emergency Medicine Go to  As needed, If symptoms worsen [facility] [address]  [address] [facility] Emergency Department, [address] [facility] , [place], [place], [facility] [address]  Call  To schedule an appointment with a primary care physician [phone]       Hardik Hammond MD Family Medicine Schedule an appointment as soon as possible for a visit   [facility]        Nura Ellis MD General Surgery, Wound Care Schedule an appointment as soon as possible for a visit   [address] [address] [address]  [phone]       [facility] Gastroenterology Schedule an appointment as soon as possible for a visit today As needed, If symptoms worsen [address]  [address] [address] [facility] [address]  Maurice Hammond [address] [facility], [address], [place] [address] [facility], [place], [place], [address] [phone]          Discharge Medication List as of 11/28/2022  4:20 AM      START taking these medications    Details   ! ! pantoprazole (PROTONIX) 40 mg tablet Take 1 tablet (40 mg total) by mouth daily, Starting Mon 11/28/2022, Until Wed 12/28/2022, Normal       !! - Potential duplicate medications found  Please discuss with provider  CONTINUE these medications which have NOT CHANGED    Details   ! ! pantoprazole (PROTONIX) 40 mg tablet Take 1 tablet (40 mg total) by mouth daily before breakfast, Starting Thu 9/1/2022, Normal       !! - Potential duplicate medications found  Please discuss with provider  No discharge procedures on file      PDMP Review       Value Time User    PDMP Reviewed  Yes 2/4/2020 10:31 AM Susy Ogden MD          ED Provider  Electronically Signed by           Chuy Cronin PA-C  11/28/22 8145

## 2022-11-28 NOTE — ED NOTES
AVS reviewed with pt by provider, pt verbalized understanding of d/c instructions  No questions or concerns at this time  VSS   Pt left ambulatory with steady gait, alert and oriented      Yuko Jay RN  11/28/22 5442

## 2022-11-29 ENCOUNTER — CONSULT (OUTPATIENT)
Dept: GASTROENTEROLOGY | Facility: MEDICAL CENTER | Age: 42
End: 2022-11-29

## 2022-11-29 VITALS
SYSTOLIC BLOOD PRESSURE: 116 MMHG | WEIGHT: 148 LBS | HEIGHT: 71 IN | OXYGEN SATURATION: 98 % | BODY MASS INDEX: 20.72 KG/M2 | TEMPERATURE: 98.6 F | HEART RATE: 82 BPM | DIASTOLIC BLOOD PRESSURE: 72 MMHG

## 2022-11-29 DIAGNOSIS — D50.9 IRON DEFICIENCY ANEMIA, UNSPECIFIED IRON DEFICIENCY ANEMIA TYPE: ICD-10-CM

## 2022-11-29 DIAGNOSIS — R10.12 LEFT UPPER QUADRANT ABDOMINAL PAIN: Primary | ICD-10-CM

## 2022-11-29 RX ORDER — OMEPRAZOLE 40 MG/1
40 CAPSULE, DELAYED RELEASE ORAL DAILY
Qty: 30 CAPSULE | Refills: 2 | Status: SHIPPED | OUTPATIENT
Start: 2022-11-29

## 2022-11-29 NOTE — PATIENT INSTRUCTIONS
Scheduled date of EGD (as of today) 1/24/23  Physician performing: Dr Amie Orellana   Location of procedure:  west end  Instructions given to patient:  rony  Clearances: rony

## 2022-11-29 NOTE — PROGRESS NOTES
[address] [facility] - Outpatient Consultation  Eden Wray 43 y o  female MRN: [id number]  Encounter: [id number]          ASSESSMENT AND PLAN:    1  Left upper quadrant abdominal pain    2  Iron deficiency anemia, unspecified iron deficiency anemia type        43 y o  female w/ hx of prior iron deficiency anemia attributed to menorrhagia who is referred to GI for chronic upper abdominal pain  The pain occurs daily and lasts for hours, and it can vary between the RUQ and LUQ, sometimes associated with GERD  Food triggers for GERD seem to exacerbate pain  Therefore, I have recommended resuming PPI (switch to omeprazole) and timing them appropriately before breakfast  Given the significant weight loss and persistent symptoms, I will also order EGD to evaluate for PUD and H pylori  The location of pain and frequency are not consistent with biliary colic  I would not jump toward cholecystectomy at this stage     - EGD  - Omeprazole 40 mg daily (30 min before breakfast)  - Iron studies, not checked since deficient in 2018    Follow up in 3 months    Orders Placed This Encounter   Procedures   • Ferritin   • Iron Saturation %   • EGD     ______________________________________________________________________    HPI:    Eden Wray is a 43 y o  female w/ hx of prior iron deficiency anemia attributed to menorrhagia who is referred to GI for chronic abdominal pain  Patient reports 1 year of sharp upper abdominal pain happening on a daily basis, often lasting for 4-5 hours and causing patient to crawl up in bed in the fetal position until the pain resolves on its own  It can often be associated with heartburn and be triggered by foods such as spicy foods and tomato sauce  She admits to losing over 30 lbs in the last year since symptoms started although she also had a death of a loved one 1 year ago resulting some feelings of depression      She has tried pantoprazole 40 mg daily without much improvement, but she was taking it after breakfast  She used to take NSAIDs over a year ago  RUQ US 9/2022 showed a 1 3 cm gallstone and known hemangioma  She most recently went to the ED yesterday where CMP, CBC, and lipase were normal  Patient was referred to GI and general surgery  REVIEW OF SYSTEMS:  As per HPI  Otherwise negative  Historical Information   Past Medical History:   Diagnosis Date   • Anemia     iron def   • Chronic pain disorder     back   • Migraines    • Pneumonia 2018   • Seasonal allergies    • Seizure ([facility] [address] )    • Seizures ([facility] [address] )     38 years ago     Past Surgical History:   Procedure Laterality Date   • [place] EXC TUMOR SOFT TISSUE THIGH/KNEE SUBFASC 5+CM Right 2/3/2020    Procedure: EXCISIONAL BIOPSY SOFT  TISSUE MASS RIGHT KNEE;  Surgeon: Davis Eaton MD;  Location: [address] [place] [address];  Service: Orthopedics   • TUBAL LIGATION       Social History   Social History     Substance and Sexual Activity   Alcohol Use No     Social History     Substance and Sexual Activity   Drug Use No     Social History     Tobacco Use   Smoking Status Every Day   • Packs/day: 0 25   • Types: Cigarettes   Smokeless Tobacco Never     Family History   Problem Relation Age of Onset   • Heart disease Mother    • Hypertension Mother        Meds/Allergies       Current Outpatient Medications:   •  omeprazole (PriLOSEC) 40 MG capsule    No Known Allergies        Objective     Blood pressure 116/72, pulse 82, temperature 98 6 °F (37 °C), height 5' 11" (1 803 m), weight 67 1 kg (148 lb), SpO2 98 %, not currently breastfeeding  Body mass index is 20 64 kg/m²  PHYSICAL EXAM:      General: No acute distress, thin  Abdomen: Soft, moderate LUQ tenderness, no RUQ tenderness, neg Davila sign, non-distended, normoactive bowel sounds  Neuro: Awake, alert, oriented x 3    Lab Results:   No visits with results within 1 Day(s) from this visit     Latest known visit with results is:   Admission on 11/28/2022, Discharged on 11/28/2022   Component Date Value   • WBC 11/28/2022 6 45    • RBC 11/28/2022 5 16 (H)    • Hemoglobin 11/28/2022 13 0    • Hematocrit 11/28/2022 42 1    • MCV 11/28/2022 82    • MCH 11/28/2022 25 2 (L)    • MCHC 11/28/2022 30 9 (L)    • RDW 11/28/2022 14 4    • MPV 11/28/2022 10 9    • Platelets 67/53/6207 248    • nRBC 11/28/2022 0    • Neutrophils Relative 11/28/2022 59    • Immat GRANS % 11/28/2022 0    • Lymphocytes Relative 11/28/2022 32    • Monocytes Relative 11/28/2022 6    • Eosinophils Relative 11/28/2022 2    • Basophils Relative 11/28/2022 1    • Neutrophils Absolute 11/28/2022 3 83    • Immature Grans Absolute 11/28/2022 0 01    • Lymphocytes Absolute 11/28/2022 2 07    • Monocytes Absolute 11/28/2022 0 39    • Eosinophils Absolute 11/28/2022 0 12    • Basophils Absolute 11/28/2022 0 03    • Sodium 11/28/2022 140    • Potassium 11/28/2022 3 3 (L)    • Chloride 11/28/2022 103    • CO2 11/28/2022 29    • ANION GAP 11/28/2022 8    • BUN 11/28/2022 11    • Creatinine 11/28/2022 0 84    • Glucose 11/28/2022 103    • Calcium 11/28/2022 9 7    • AST 11/28/2022     • ALT 11/28/2022 26    • Alkaline Phosphatase 11/28/2022 70    • Total Protein 11/28/2022 8 0    • Albumin 11/28/2022 3 8    • Total Bilirubin 11/28/2022 0 22    • eGFR 11/28/2022 86    • Lipase 11/28/2022 211

## 2022-11-29 NOTE — H&P (VIEW-ONLY)
[facility] - Outpatient Consultation  Maggie Siu 43 y o  female MRN: [id number]  Encounter: [id number]          ASSESSMENT AND PLAN:    1  Left upper quadrant abdominal pain    2  Iron deficiency anemia, unspecified iron deficiency anemia type        43 y o  female w/ hx of prior iron deficiency anemia attributed to menorrhagia who is referred to GI for chronic upper abdominal pain  The pain occurs daily and lasts for hours, and it can vary between the RUQ and LUQ, sometimes associated with GERD  Food triggers for GERD seem to exacerbate pain  Therefore, I have recommended resuming PPI (switch to omeprazole) and timing them appropriately before breakfast  Given the significant weight loss and persistent symptoms, I will also order EGD to evaluate for PUD and H pylori  The location of pain and frequency are not consistent with biliary colic  I would not jump toward cholecystectomy at this stage     - EGD  - Omeprazole 40 mg daily (30 min before breakfast)  - Iron studies, not checked since deficient in 2018    Follow up in 3 months    Orders Placed This Encounter   Procedures   • Ferritin   • Iron Saturation %   • EGD     ______________________________________________________________________    HPI:    Maggie Siu is a 43 y o  female w/ hx of prior iron deficiency anemia attributed to menorrhagia who is referred to GI for chronic abdominal pain  Patient reports 1 year of sharp upper abdominal pain happening on a daily basis, often lasting for 4-5 hours and causing patient to crawl up in bed in the fetal position until the pain resolves on its own  It can often be associated with heartburn and be triggered by foods such as spicy foods and tomato sauce  She admits to losing over 30 lbs in the last year since symptoms started although she also had a death of a loved one 1 year ago resulting some feelings of depression      She has tried pantoprazole 40 mg daily without much improvement, but she was taking it after breakfast  She used to take NSAIDs over a year ago  RUQ US 9/2022 showed a 1 3 cm gallstone and known hemangioma  She most recently went to the ED yesterday where CMP, CBC, and lipase were normal  Patient was referred to GI and general surgery  REVIEW OF SYSTEMS:  As per HPI  Otherwise negative  Historical Information   Past Medical History:   Diagnosis Date   • Anemia     iron def   • Chronic pain disorder     back   • Migraines    • Pneumonia 2018   • Seasonal allergies    • Seizure ([facility] [address] )    • Seizures ([facility] [address] )     38 years ago     Past Surgical History:   Procedure Laterality Date   • MA EXC TUMOR SOFT TISSUE THIGH/KNEE SUBFASC 5+CM Right 2/3/2020    Procedure: EXCISIONAL BIOPSY SOFT  TISSUE MASS RIGHT KNEE;  Surgeon: Roxi Varela MD;  Location: [address] [place] [address];  Service: Orthopedics   • TUBAL LIGATION       Social History   Social History     Substance and Sexual Activity   Alcohol Use No     Social History     Substance and Sexual Activity   Drug Use No     Social History     Tobacco Use   Smoking Status Every Day   • Packs/day: 0 25   • Types: Cigarettes   Smokeless Tobacco Never     Family History   Problem Relation Age of Onset   • Heart disease Mother    • Hypertension Mother        Meds/Allergies       Current Outpatient Medications:   •  omeprazole (PriLOSEC) 40 MG capsule    No Known Allergies        Objective     Blood pressure 116/72, pulse 82, temperature 98 6 °F (37 °C), height 5' 11" (1 803 m), weight 67 1 kg (148 lb), SpO2 98 %, not currently breastfeeding  Body mass index is 20 64 kg/m²  PHYSICAL EXAM:      General: No acute distress, thin  Abdomen: Soft, moderate LUQ tenderness, no RUQ tenderness, neg Davila sign, non-distended, normoactive bowel sounds  Neuro: Awake, alert, oriented x 3    Lab Results:   No visits with results within 1 Day(s) from this visit     Latest known visit with results is:   Admission on 11/28/2022, Discharged on 11/28/2022   Component Date Value   • WBC 11/28/2022 6 45    • RBC 11/28/2022 5 16 (H)    • Hemoglobin 11/28/2022 13 0    • Hematocrit 11/28/2022 42 1    • MCV 11/28/2022 82    • MCH 11/28/2022 25 2 (L)    • MCHC 11/28/2022 30 9 (L)    • RDW 11/28/2022 14 4    • MPV 11/28/2022 10 9    • Platelets 47/67/4918 248    • nRBC 11/28/2022 0    • Neutrophils Relative 11/28/2022 59    • Immat GRANS % 11/28/2022 0    • Lymphocytes Relative 11/28/2022 32    • Monocytes Relative 11/28/2022 6    • Eosinophils Relative 11/28/2022 2    • Basophils Relative 11/28/2022 1    • Neutrophils Absolute 11/28/2022 3 83    • Immature Grans Absolute 11/28/2022 0 01    • Lymphocytes Absolute 11/28/2022 2 07    • Monocytes Absolute 11/28/2022 0 39    • Eosinophils Absolute 11/28/2022 0 12    • Basophils Absolute 11/28/2022 0 03    • Sodium 11/28/2022 140    • Potassium 11/28/2022 3 3 (L)    • Chloride 11/28/2022 103    • CO2 11/28/2022 29    • ANION GAP 11/28/2022 8    • BUN 11/28/2022 11    • Creatinine 11/28/2022 0 84    • Glucose 11/28/2022 103    • Calcium 11/28/2022 9 7    • AST 11/28/2022     • ALT 11/28/2022 26    • Alkaline Phosphatase 11/28/2022 70    • Total Protein 11/28/2022 8 0    • Albumin 11/28/2022 3 8    • Total Bilirubin 11/28/2022 0 22    • eGFR 11/28/2022 86    • Lipase 11/28/2022 211

## 2022-12-14 RX ORDER — ONDANSETRON 2 MG/ML
4 INJECTION INTRAMUSCULAR; INTRAVENOUS ONCE AS NEEDED
Status: CANCELLED | OUTPATIENT
Start: 2022-12-14

## 2022-12-14 RX ORDER — SODIUM CHLORIDE 9 MG/ML
125 INJECTION, SOLUTION INTRAVENOUS CONTINUOUS
Status: CANCELLED | OUTPATIENT
Start: 2022-12-14

## 2022-12-15 ENCOUNTER — ANESTHESIA (OUTPATIENT)
Dept: GASTROENTEROLOGY | Facility: MEDICAL CENTER | Age: 42
End: 2022-12-15

## 2022-12-15 ENCOUNTER — HOSPITAL ENCOUNTER (OUTPATIENT)
Dept: GASTROENTEROLOGY | Facility: MEDICAL CENTER | Age: 42
Setting detail: OUTPATIENT SURGERY
End: 2022-12-15

## 2022-12-15 ENCOUNTER — ANESTHESIA EVENT (OUTPATIENT)
Dept: GASTROENTEROLOGY | Facility: MEDICAL CENTER | Age: 42
End: 2022-12-15

## 2022-12-15 VITALS
TEMPERATURE: 97.7 F | WEIGHT: 145 LBS | BODY MASS INDEX: 20.3 KG/M2 | SYSTOLIC BLOOD PRESSURE: 124 MMHG | RESPIRATION RATE: 18 BRPM | DIASTOLIC BLOOD PRESSURE: 65 MMHG | OXYGEN SATURATION: 99 % | HEART RATE: 70 BPM | HEIGHT: 71 IN

## 2022-12-15 DIAGNOSIS — R10.12 LEFT UPPER QUADRANT ABDOMINAL PAIN: ICD-10-CM

## 2022-12-15 LAB
EXT PREGNANCY TEST URINE: NEGATIVE
EXT. CONTROL: NORMAL

## 2022-12-15 RX ORDER — LIDOCAINE HYDROCHLORIDE 20 MG/ML
INJECTION, SOLUTION EPIDURAL; INFILTRATION; INTRACAUDAL; PERINEURAL AS NEEDED
Status: DISCONTINUED | OUTPATIENT
Start: 2022-12-15 | End: 2022-12-15

## 2022-12-15 RX ORDER — SODIUM CHLORIDE 9 MG/ML
125 INJECTION, SOLUTION INTRAVENOUS CONTINUOUS
Status: DISCONTINUED | OUTPATIENT
Start: 2022-12-15 | End: 2022-12-19 | Stop reason: HOSPADM

## 2022-12-15 RX ORDER — ONDANSETRON 2 MG/ML
4 INJECTION INTRAMUSCULAR; INTRAVENOUS ONCE AS NEEDED
Status: DISCONTINUED | OUTPATIENT
Start: 2022-12-15 | End: 2022-12-19 | Stop reason: HOSPADM

## 2022-12-15 RX ORDER — PROPOFOL 10 MG/ML
INJECTION, EMULSION INTRAVENOUS AS NEEDED
Status: DISCONTINUED | OUTPATIENT
Start: 2022-12-15 | End: 2022-12-15

## 2022-12-15 RX ADMIN — PROPOFOL 50 MG: 10 INJECTION, EMULSION INTRAVENOUS at 13:45

## 2022-12-15 RX ADMIN — SODIUM CHLORIDE 125 ML/HR: 0.9 INJECTION, SOLUTION INTRAVENOUS at 12:58

## 2022-12-15 RX ADMIN — PROPOFOL 50 MG: 10 INJECTION, EMULSION INTRAVENOUS at 13:53

## 2022-12-15 RX ADMIN — LIDOCAINE HYDROCHLORIDE 5 ML: 20 INJECTION, SOLUTION EPIDURAL; INFILTRATION; INTRACAUDAL; PERINEURAL at 13:43

## 2022-12-15 RX ADMIN — PROPOFOL 50 MG: 10 INJECTION, EMULSION INTRAVENOUS at 13:47

## 2022-12-15 RX ADMIN — PROPOFOL 150 MG: 10 INJECTION, EMULSION INTRAVENOUS at 13:43

## 2022-12-15 NOTE — INTERVAL H&P NOTE
H&P reviewed  After examining the patient I find no changes in the patients condition since the H&P had been written  Upper abdominal pain much improved with avoiding fatty foods and acid triggers as well as daily omeprazole  However, she does still get pain, mostly epigastric and LUQ but once in a while she will have RUQ pain      Vitals:    12/15/22 1244   BP: 113/65   Pulse: 60   Resp: 22   Temp: 97 7 °F (36 5 °C)   SpO2: 99%

## 2022-12-15 NOTE — ANESTHESIA POSTPROCEDURE EVALUATION
Post-Op Assessment Note    CV Status:  Stable    Pain management: adequate     Mental Status:  Alert and awake   Hydration Status:  Euvolemic   PONV Controlled:  Controlled   Airway Patency:  Patent      Post Op Vitals Reviewed: Yes            No notable events documented      BP      Temp      Pulse     Resp      SpO2      /65   Pulse 70   Temp 97 7 °F (36 5 °C) (Temporal)   Resp 18   Ht 5' 11" (1 803 m)   Wt 65 8 kg (145 lb)   SpO2 99%   BMI 20 22 kg/m²

## 2022-12-15 NOTE — ANESTHESIA PREPROCEDURE EVALUATION
Procedure:  EGD    Relevant Problems   CARDIO   (+) Migraine with aura and without status migrainosus, not intractable      HEMATOLOGY   (+) Iron deficiency anemia due to chronic blood loss      MUSCULOSKELETAL   (+) Chronic midline low back pain without sciatica      NEURO/PSYCH   (+) Chronic midline low back pain without sciatica   (+) History of intermenstrual bleeding   (+) Migraine with aura and without status migrainosus, not intractable      Other   (+) Tobacco use        Physical Exam    Airway    Mallampati score: II  TM Distance: >3 FB       Dental       Cardiovascular  Cardiovascular exam normal    Pulmonary  Pulmonary exam normal     Other Findings        Anesthesia Plan  ASA Score- 2     Anesthesia Type- IV sedation with anesthesia with ASA Monitors  Additional Monitors:   Airway Plan:           Plan Factors-Exercise tolerance (METS): >4 METS  Chart reviewed  Patient is not a current smoker  Patient instructed to abstain from smoking on day of procedure  Patient did not smoke on day of surgery  Obstructive sleep apnea risk education given perioperatively  Induction- intravenous  Postoperative Plan-     Informed Consent- Anesthetic plan and risks discussed with patient

## 2022-12-16 ENCOUNTER — TELEPHONE (OUTPATIENT)
Dept: GASTROENTEROLOGY | Facility: MEDICAL CENTER | Age: 42
End: 2022-12-16

## 2022-12-16 NOTE — TELEPHONE ENCOUNTER
Spoke with patient over the phone  When patient had awoken from anesthesia, she had reported bottom lip pain  Examination showed shallow indentations on the inside of the lip, possibly representing teeth marks or the bite block having gotten caught on the lip causing trauma  I am quite sure that this trauma is what caused lip swelling rather than an allergic reaction  I recommended she watch it and put ice on the lip as needed  Tylenol is also safe to try   Benadryl can probably be deferred

## 2022-12-16 NOTE — TELEPHONE ENCOUNTER
Spoke with pt reporting after EGD that was completed yesterday by Dr Nathan Bella, she is experiencing a sore throat and lip swelling  Pt denies alarming s/s of fever, chest pain, SOB, N/V/D  Pt able to have calm conversation and did not appear to be in distress at the time of this call  Recommended pt to take benadryl and tylenol  She will call back if symptoms worsen and agrees to proceed to ED for eval if reviewed alarming s/s occur

## 2022-12-22 ENCOUNTER — TELEPHONE (OUTPATIENT)
Dept: GASTROENTEROLOGY | Facility: MEDICAL CENTER | Age: 42
End: 2022-12-22

## 2022-12-22 DIAGNOSIS — A04.8 H. PYLORI INFECTION: Primary | ICD-10-CM

## 2022-12-22 RX ORDER — OMEPRAZOLE 40 MG/1
40 CAPSULE, DELAYED RELEASE ORAL EVERY 12 HOURS
Qty: 28 CAPSULE | Refills: 0 | Status: SHIPPED | OUTPATIENT
Start: 2022-12-22 | End: 2023-01-05

## 2022-12-22 RX ORDER — TETRACYCLINE HYDROCHLORIDE 500 MG/1
500 CAPSULE ORAL EVERY 6 HOURS
Qty: 56 CAPSULE | Refills: 0 | Status: SHIPPED | OUTPATIENT
Start: 2022-12-22 | End: 2023-01-05

## 2022-12-22 RX ORDER — BISMUTH SUBSALICYLATE 262 MG/1
262 TABLET, CHEWABLE ORAL EVERY 6 HOURS
Qty: 30 TABLET | Refills: 0 | Status: SHIPPED | OUTPATIENT
Start: 2022-12-22 | End: 2023-01-05

## 2022-12-22 RX ORDER — METRONIDAZOLE 250 MG/1
250 TABLET ORAL EVERY 6 HOURS
Qty: 56 TABLET | Refills: 0 | Status: SHIPPED | OUTPATIENT
Start: 2022-12-22 | End: 2023-01-05

## 2022-12-22 NOTE — TELEPHONE ENCOUNTER
----- Message from Stacy Casiano sent at 12/22/2022  3:00 PM EST -----    ----- Message -----  From: Bertrand Walker MD  Sent: 12/22/2022  12:50 PM EST  To: Starr Baptiste MD, #    Patient updated by [address]  Gastric biopsies positive for H pylori which certainly could be the cause of her upper abdominal pain  Will ask GI staff to treat and confirm eradication per protocol (preferably quadruple therapy if no contraindications)

## 2022-12-22 NOTE — TELEPHONE ENCOUNTER
LVM with first number called for patient to call for results  InNetworkt message also sent reviewing H pylori protocol treatment and post treatment testing  Prescriptions sent to pt's [facility] on [address] in [place]

## 2022-12-27 ENCOUNTER — OFFICE VISIT (OUTPATIENT)
Dept: SURGERY | Facility: CLINIC | Age: 42
End: 2022-12-27

## 2022-12-27 VITALS
HEIGHT: 71 IN | OXYGEN SATURATION: 99 % | SYSTOLIC BLOOD PRESSURE: 110 MMHG | TEMPERATURE: 97.5 F | HEART RATE: 76 BPM | RESPIRATION RATE: 18 BRPM | BODY MASS INDEX: 20.86 KG/M2 | WEIGHT: 149 LBS | DIASTOLIC BLOOD PRESSURE: 75 MMHG

## 2022-12-27 DIAGNOSIS — K80.10 CALCULUS OF GALLBLADDER WITH CHRONIC CHOLECYSTITIS WITHOUT OBSTRUCTION: ICD-10-CM

## 2022-12-27 DIAGNOSIS — K80.12 CALCULUS OF GALLBLADDER WITH ACUTE AND CHRONIC CHOLECYSTITIS WITHOUT OBSTRUCTION: Primary | ICD-10-CM

## 2022-12-27 DIAGNOSIS — K21.00 GASTROESOPHAGEAL REFLUX DISEASE WITH ESOPHAGITIS WITHOUT HEMORRHAGE: ICD-10-CM

## 2022-12-27 RX ORDER — SODIUM CHLORIDE, SODIUM LACTATE, POTASSIUM CHLORIDE, CALCIUM CHLORIDE 600; 310; 30; 20 MG/100ML; MG/100ML; MG/100ML; MG/100ML
125 INJECTION, SOLUTION INTRAVENOUS CONTINUOUS
OUTPATIENT
Start: 2023-01-16

## 2022-12-27 NOTE — ASSESSMENT & PLAN NOTE
I also reviewed her ultrasound shows evidence of at least 1 large gallstone  I believe that she has been having overlapping issues with reflux and cholelithiasis  Now that she is on omeprazole and watch her diet a lot of her reflux symptoms have resolved  In addition she is has been avoiding fatty foods and doing well but she still has had intermittent right-sided pains and is concerned about her gallstones  I would is reasonable to proceed with cholecystectomy  The risks and benefits alternatives were explained to her and she is agreeable to proceed

## 2022-12-27 NOTE — PROGRESS NOTES
Assessment/Plan:    Gastroesophageal reflux disease with esophagitis without hemorrhage  She does have evidence of esophagitis on EGD and H  pylori positive  She has had improvement in her symptoms with PPI treatment and is now started on medication for H  pylori  Calculus of gallbladder with chronic cholecystitis without obstruction  I also reviewed her ultrasound shows evidence of at least 1 large gallstone  I believe that she has been having overlapping issues with reflux and cholelithiasis  Now that she is on omeprazole and watch her diet a lot of her reflux symptoms have resolved  In addition she is has been avoiding fatty foods and doing well but she still has had intermittent right-sided pains and is concerned about her gallstones  I would is reasonable to proceed with cholecystectomy  The risks and benefits alternatives were explained to her and she is agreeable to proceed  Diagnoses and all orders for this visit:    Calculus of gallbladder with acute and chronic cholecystitis without obstruction  -     Case request operating room: CHOLECYSTECTOMY LAPAROSCOPIC; Standing  -     Case request operating room: CHOLECYSTECTOMY LAPAROSCOPIC    Calculus of gallbladder with chronic cholecystitis without obstruction    Gastroesophageal reflux disease with esophagitis without hemorrhage    Other orders  -     Diet NPO; Sips with meds; Standing  -     Apply Sequential Compression Device; Standing  -     Place sequential compression device; Standing          Subjective:      Patient ID: Sun Frey is a 43 y o  female  Ms Ana Noe is a 44-year-old female here for evaluation of abdominal pain and gallstones  Patient's been having abdominal symptoms on and off for at least a year and had significant weight loss as a result  She recently has been seen by gastroenterology and started on a PPI and underwent endoscopy    Since starting the PPI she has had some improvement her symptoms specifically the left-sided pain and reflux pains  However she still has right side abdominal pain  Yesterday she had some chocolate cookies and had some pain this morning  The following portions of the patient's history were reviewed and updated as appropriate: allergies, current medications, past family history, past medical history, past social history, past surgical history and problem list     Review of Systems   Constitutional: Negative for chills and fever  HENT: Negative for ear pain and sore throat  Eyes: Negative for pain and visual disturbance  Respiratory: Negative for cough and shortness of breath  Cardiovascular: Negative for chest pain and palpitations  Gastrointestinal: Positive for abdominal pain and nausea  Negative for vomiting  Genitourinary: Negative for dysuria and hematuria  Musculoskeletal: Negative for arthralgias and back pain  Skin: Negative for color change and rash  Neurological: Negative for seizures and syncope  Psychiatric/Behavioral: Negative for agitation and behavioral problems  All other systems reviewed and are negative  Objective:      /75 (BP Location: Left arm, Patient Position: Sitting, Cuff Size: Standard)   Pulse 76   Temp 97 5 °F (36 4 °C) (Temporal)   Resp 18   Ht 5' 11" (1 803 m)   Wt 67 6 kg (149 lb)   SpO2 99%   BMI 20 78 kg/m²          Physical Exam  Vitals and nursing note reviewed  Constitutional:       General: She is not in acute distress  Appearance: She is well-developed  She is not diaphoretic  HENT:      Head: Normocephalic and atraumatic  Eyes:      Pupils: Pupils are equal, round, and reactive to light  Cardiovascular:      Rate and Rhythm: Normal rate and regular rhythm  Heart sounds: Normal heart sounds  No murmur heard  Pulmonary:      Effort: Pulmonary effort is normal  No respiratory distress  Breath sounds: Normal breath sounds  No wheezing     Abdominal:      General: Bowel sounds are normal       Palpations: Abdomen is soft  Musculoskeletal:         General: Normal range of motion  Cervical back: Normal range of motion and neck supple  Skin:     General: Skin is warm and dry  Neurological:      Mental Status: She is alert and oriented to person, place, and time     Psychiatric:         Behavior: Behavior normal

## 2022-12-27 NOTE — H&P (VIEW-ONLY)
Assessment/Plan:    Gastroesophageal reflux disease with esophagitis without hemorrhage  She does have evidence of esophagitis on EGD and H  pylori positive  She has had improvement in her symptoms with PPI treatment and is now started on medication for H  pylori  Calculus of gallbladder with chronic cholecystitis without obstruction  I also reviewed her ultrasound shows evidence of at least 1 large gallstone  I believe that she has been having overlapping issues with reflux and cholelithiasis  Now that she is on omeprazole and watch her diet a lot of her reflux symptoms have resolved  In addition she is has been avoiding fatty foods and doing well but she still has had intermittent right-sided pains and is concerned about her gallstones  I would is reasonable to proceed with cholecystectomy  The risks and benefits alternatives were explained to her and she is agreeable to proceed  Diagnoses and all orders for this visit:    Calculus of gallbladder with acute and chronic cholecystitis without obstruction  -     Case request operating room: CHOLECYSTECTOMY LAPAROSCOPIC; Standing  -     Case request operating room: CHOLECYSTECTOMY LAPAROSCOPIC    Calculus of gallbladder with chronic cholecystitis without obstruction    Gastroesophageal reflux disease with esophagitis without hemorrhage    Other orders  -     Diet NPO; Sips with meds; Standing  -     Apply Sequential Compression Device; Standing  -     Place sequential compression device; Standing          Subjective:      Patient ID: Bard Lehman is a 43 y o  female  Ms Anisa Dobbins is a 80-year-old female here for evaluation of abdominal pain and gallstones  Patient's been having abdominal symptoms on and off for at least a year and had significant weight loss as a result  She recently has been seen by gastroenterology and started on a PPI and underwent endoscopy    Since starting the PPI she has had some improvement her symptoms specifically the left-sided pain and reflux pains  However she still has right side abdominal pain  Yesterday she had some chocolate cookies and had some pain this morning  The following portions of the patient's history were reviewed and updated as appropriate: allergies, current medications, past family history, past medical history, past social history, past surgical history and problem list     Review of Systems   Constitutional: Negative for chills and fever  HENT: Negative for ear pain and sore throat  Eyes: Negative for pain and visual disturbance  Respiratory: Negative for cough and shortness of breath  Cardiovascular: Negative for chest pain and palpitations  Gastrointestinal: Positive for abdominal pain and nausea  Negative for vomiting  Genitourinary: Negative for dysuria and hematuria  Musculoskeletal: Negative for arthralgias and back pain  Skin: Negative for color change and rash  Neurological: Negative for seizures and syncope  Psychiatric/Behavioral: Negative for agitation and behavioral problems  All other systems reviewed and are negative  Objective:      /75 (BP Location: Left arm, Patient Position: Sitting, Cuff Size: Standard)   Pulse 76   Temp 97 5 °F (36 4 °C) (Temporal)   Resp 18   Ht 5' 11" (1 803 m)   Wt 67 6 kg (149 lb)   SpO2 99%   BMI 20 78 kg/m²          Physical Exam  Vitals and nursing note reviewed  Constitutional:       General: She is not in acute distress  Appearance: She is well-developed  She is not diaphoretic  HENT:      Head: Normocephalic and atraumatic  Eyes:      Pupils: Pupils are equal, round, and reactive to light  Cardiovascular:      Rate and Rhythm: Normal rate and regular rhythm  Heart sounds: Normal heart sounds  No murmur heard  Pulmonary:      Effort: Pulmonary effort is normal  No respiratory distress  Breath sounds: Normal breath sounds  No wheezing     Abdominal:      General: Bowel sounds are normal       Palpations: Abdomen is soft  Musculoskeletal:         General: Normal range of motion  Cervical back: Normal range of motion and neck supple  Skin:     General: Skin is warm and dry  Neurological:      Mental Status: She is alert and oriented to person, place, and time     Psychiatric:         Behavior: Behavior normal

## 2022-12-27 NOTE — ASSESSMENT & PLAN NOTE
She does have evidence of esophagitis on EGD and H  pylori positive  She has had improvement in her symptoms with PPI treatment and is now started on medication for H  pylori

## 2023-01-10 RX ORDER — METRONIDAZOLE 250 MG/1
TABLET ORAL EVERY 6 HOURS
COMMUNITY

## 2023-01-10 NOTE — PRE-PROCEDURE INSTRUCTIONS
Pre-Surgery Instructions:   Medication Instructions   • metroNIDAZOLE (FLAGYL) 250 mg tablet Continue as prescribed   • omeprazole (PriLOSEC) 40 MG capsule Continue to take as prescribed including DOS with a small sip of water, unless usually taken at night    Avoid non-prescribed ASPIRIN, OTC vitamins and NSAIDS prior to surgery  Tylenol okay PRN  Continue scheduled medications excluding DOS  Avoid smoking prior to Surgery   Avoid alcohol, illicit drugs and marijuana for 24 hours prior to DOS  NPO instructions given: no food, water or anything else by mouth after midnight prior to surgery  Shower the night before and the morning of surgery using CHG wash or antibacterial soap if CHG is not indicated  Avoid shaving for 24 hours prior to DOS  Avoid using lotions, powders, oils, makeup, hair products, etc  DOS  Patient given up to date visitor guidelines  A ride home after surgery is needed- failure to have a ride can result in cancellation  Remove jewelry and not to bring valuables DOS  Dentures and contact lenses will have to be removed for surgery  Patient understands he or she will receive a call the afternoon before surgery regarding an arrival time  If you have any changes in your health before DOS please call the surgeon's office  Patient verbalized understanding of all instructions

## 2023-01-16 ENCOUNTER — ANESTHESIA (OUTPATIENT)
Dept: PERIOP | Facility: HOSPITAL | Age: 43
End: 2023-01-16

## 2023-01-16 ENCOUNTER — ANESTHESIA EVENT (OUTPATIENT)
Dept: PERIOP | Facility: HOSPITAL | Age: 43
End: 2023-01-16

## 2023-01-16 ENCOUNTER — HOSPITAL ENCOUNTER (OUTPATIENT)
Facility: HOSPITAL | Age: 43
Setting detail: OUTPATIENT SURGERY
Discharge: HOME | End: 2023-01-16
Attending: SURGERY | Admitting: SURGERY

## 2023-01-16 VITALS
HEART RATE: 63 BPM | RESPIRATION RATE: 16 BRPM | BODY MASS INDEX: 20.86 KG/M2 | OXYGEN SATURATION: 100 % | WEIGHT: 149.03 LBS | HEIGHT: 71 IN | SYSTOLIC BLOOD PRESSURE: 111 MMHG | DIASTOLIC BLOOD PRESSURE: 67 MMHG | TEMPERATURE: 97.5 F

## 2023-01-16 DIAGNOSIS — K80.12 CALCULUS OF GALLBLADDER WITH ACUTE AND CHRONIC CHOLECYSTITIS WITHOUT OBSTRUCTION: ICD-10-CM

## 2023-01-16 DIAGNOSIS — T65.291A TOXIC EFFECT OF TOBACCO AND NICOTINE, ACCIDENTAL OR UNINTENTIONAL, INITIAL ENCOUNTER: Primary | ICD-10-CM

## 2023-01-16 LAB
EXT PREGNANCY TEST URINE: NEGATIVE
EXT. CONTROL: NORMAL

## 2023-01-16 RX ORDER — ONDANSETRON 2 MG/ML
INJECTION INTRAMUSCULAR; INTRAVENOUS AS NEEDED
Status: DISCONTINUED | OUTPATIENT
Start: 2023-01-16 | End: 2023-01-16

## 2023-01-16 RX ORDER — BUPIVACAINE HYDROCHLORIDE AND EPINEPHRINE 2.5; 5 MG/ML; UG/ML
INJECTION, SOLUTION EPIDURAL; INFILTRATION; INTRACAUDAL; PERINEURAL AS NEEDED
Status: DISCONTINUED | OUTPATIENT
Start: 2023-01-16 | End: 2023-01-16 | Stop reason: HOSPADM

## 2023-01-16 RX ORDER — FENTANYL CITRATE 50 UG/ML
INJECTION, SOLUTION INTRAMUSCULAR; INTRAVENOUS AS NEEDED
Status: DISCONTINUED | OUTPATIENT
Start: 2023-01-16 | End: 2023-01-16

## 2023-01-16 RX ORDER — FENTANYL CITRATE/PF 50 MCG/ML
25 SYRINGE (ML) INJECTION
Status: COMPLETED | OUTPATIENT
Start: 2023-01-16 | End: 2023-01-16

## 2023-01-16 RX ORDER — DEXAMETHASONE SODIUM PHOSPHATE 10 MG/ML
INJECTION, SOLUTION INTRAMUSCULAR; INTRAVENOUS AS NEEDED
Status: DISCONTINUED | OUTPATIENT
Start: 2023-01-16 | End: 2023-01-16

## 2023-01-16 RX ORDER — KETOROLAC TROMETHAMINE 30 MG/ML
INJECTION, SOLUTION INTRAMUSCULAR; INTRAVENOUS AS NEEDED
Status: DISCONTINUED | OUTPATIENT
Start: 2023-01-16 | End: 2023-01-16

## 2023-01-16 RX ORDER — MIDAZOLAM HYDROCHLORIDE 2 MG/2ML
INJECTION, SOLUTION INTRAMUSCULAR; INTRAVENOUS AS NEEDED
Status: DISCONTINUED | OUTPATIENT
Start: 2023-01-16 | End: 2023-01-16

## 2023-01-16 RX ORDER — MAGNESIUM HYDROXIDE 1200 MG/15ML
LIQUID ORAL AS NEEDED
Status: DISCONTINUED | OUTPATIENT
Start: 2023-01-16 | End: 2023-01-16 | Stop reason: HOSPADM

## 2023-01-16 RX ORDER — CEFAZOLIN SODIUM 1 G/50ML
SOLUTION INTRAVENOUS AS NEEDED
Status: DISCONTINUED | OUTPATIENT
Start: 2023-01-16 | End: 2023-01-16

## 2023-01-16 RX ORDER — CEFAZOLIN SODIUM 1 G/50ML
1000 SOLUTION INTRAVENOUS ONCE
Status: DISCONTINUED | OUTPATIENT
Start: 2023-01-16 | End: 2023-01-16 | Stop reason: HOSPADM

## 2023-01-16 RX ORDER — HYDROMORPHONE HCL/PF 1 MG/ML
0.5 SYRINGE (ML) INJECTION
Status: DISCONTINUED | OUTPATIENT
Start: 2023-01-16 | End: 2023-01-16 | Stop reason: HOSPADM

## 2023-01-16 RX ORDER — OXYCODONE HYDROCHLORIDE 5 MG/1
5 TABLET ORAL EVERY 4 HOURS PRN
Qty: 20 TABLET | Refills: 0 | Status: SHIPPED | OUTPATIENT
Start: 2023-01-16 | End: 2023-01-26

## 2023-01-16 RX ORDER — SODIUM CHLORIDE, SODIUM LACTATE, POTASSIUM CHLORIDE, CALCIUM CHLORIDE 600; 310; 30; 20 MG/100ML; MG/100ML; MG/100ML; MG/100ML
125 INJECTION, SOLUTION INTRAVENOUS CONTINUOUS
Status: DISCONTINUED | OUTPATIENT
Start: 2023-01-16 | End: 2023-01-16 | Stop reason: HOSPADM

## 2023-01-16 RX ORDER — OXYCODONE HYDROCHLORIDE AND ACETAMINOPHEN 5; 325 MG/1; MG/1
2 TABLET ORAL EVERY 6 HOURS PRN
Status: DISCONTINUED | OUTPATIENT
Start: 2023-01-16 | End: 2023-01-16 | Stop reason: HOSPADM

## 2023-01-16 RX ORDER — ROCURONIUM BROMIDE 10 MG/ML
INJECTION, SOLUTION INTRAVENOUS AS NEEDED
Status: DISCONTINUED | OUTPATIENT
Start: 2023-01-16 | End: 2023-01-16

## 2023-01-16 RX ORDER — ONDANSETRON 2 MG/ML
4 INJECTION INTRAMUSCULAR; INTRAVENOUS ONCE AS NEEDED
Status: DISCONTINUED | OUTPATIENT
Start: 2023-01-16 | End: 2023-01-16 | Stop reason: HOSPADM

## 2023-01-16 RX ORDER — OXYCODONE HYDROCHLORIDE AND ACETAMINOPHEN 5; 325 MG/1; MG/1
1 TABLET ORAL EVERY 4 HOURS PRN
Status: DISCONTINUED | OUTPATIENT
Start: 2023-01-16 | End: 2023-01-16 | Stop reason: HOSPADM

## 2023-01-16 RX ORDER — PROPOFOL 10 MG/ML
INJECTION, EMULSION INTRAVENOUS AS NEEDED
Status: DISCONTINUED | OUTPATIENT
Start: 2023-01-16 | End: 2023-01-16

## 2023-01-16 RX ORDER — LIDOCAINE HYDROCHLORIDE 20 MG/ML
INJECTION, SOLUTION EPIDURAL; INFILTRATION; INTRACAUDAL; PERINEURAL AS NEEDED
Status: DISCONTINUED | OUTPATIENT
Start: 2023-01-16 | End: 2023-01-16

## 2023-01-16 RX ADMIN — SUGAMMADEX 300 MG: 100 INJECTION, SOLUTION INTRAVENOUS at 11:30

## 2023-01-16 RX ADMIN — SODIUM CHLORIDE, SODIUM LACTATE, POTASSIUM CHLORIDE, AND CALCIUM CHLORIDE 125 ML/HR: 600; 310; 30; 20 INJECTION, SOLUTION INTRAVENOUS at 07:58

## 2023-01-16 RX ADMIN — OXYCODONE AND ACETAMINOPHEN 1 TABLET: 5; 325 TABLET ORAL at 13:17

## 2023-01-16 RX ADMIN — ROCURONIUM BROMIDE 10 MG: 10 INJECTION, SOLUTION INTRAVENOUS at 11:04

## 2023-01-16 RX ADMIN — DEXAMETHASONE SODIUM PHOSPHATE 10 MG: 10 INJECTION INTRAMUSCULAR; INTRAVENOUS at 09:47

## 2023-01-16 RX ADMIN — FENTANYL CITRATE 25 MCG: 50 INJECTION, SOLUTION INTRAMUSCULAR; INTRAVENOUS at 11:46

## 2023-01-16 RX ADMIN — FENTANYL CITRATE 50 MCG: 50 INJECTION INTRAMUSCULAR; INTRAVENOUS at 10:16

## 2023-01-16 RX ADMIN — HYDROMORPHONE HYDROCHLORIDE 0.5 MG: 1 INJECTION, SOLUTION INTRAMUSCULAR; INTRAVENOUS; SUBCUTANEOUS at 12:08

## 2023-01-16 RX ADMIN — LIDOCAINE HYDROCHLORIDE 80 MG: 20 INJECTION, SOLUTION EPIDURAL; INFILTRATION; INTRACAUDAL; PERINEURAL at 09:29

## 2023-01-16 RX ADMIN — FENTANYL CITRATE 25 MCG: 50 INJECTION, SOLUTION INTRAMUSCULAR; INTRAVENOUS at 11:57

## 2023-01-16 RX ADMIN — FENTANYL CITRATE 50 MCG: 50 INJECTION INTRAMUSCULAR; INTRAVENOUS at 10:27

## 2023-01-16 RX ADMIN — ROCURONIUM BROMIDE 50 MG: 10 INJECTION, SOLUTION INTRAVENOUS at 09:29

## 2023-01-16 RX ADMIN — PROPOFOL 150 MG: 10 INJECTION, EMULSION INTRAVENOUS at 09:29

## 2023-01-16 RX ADMIN — PROPOFOL 30 MG: 10 INJECTION, EMULSION INTRAVENOUS at 11:04

## 2023-01-16 RX ADMIN — KETOROLAC TROMETHAMINE 30 MG: 30 INJECTION, SOLUTION INTRAMUSCULAR at 11:16

## 2023-01-16 RX ADMIN — FENTANYL CITRATE 50 MCG: 50 INJECTION INTRAMUSCULAR; INTRAVENOUS at 09:29

## 2023-01-16 RX ADMIN — FENTANYL CITRATE 25 MCG: 50 INJECTION, SOLUTION INTRAMUSCULAR; INTRAVENOUS at 11:52

## 2023-01-16 RX ADMIN — FENTANYL CITRATE 50 MCG: 50 INJECTION INTRAMUSCULAR; INTRAVENOUS at 09:44

## 2023-01-16 RX ADMIN — MIDAZOLAM 2 MG: 1 INJECTION INTRAMUSCULAR; INTRAVENOUS at 09:28

## 2023-01-16 RX ADMIN — FENTANYL CITRATE 25 MCG: 50 INJECTION, SOLUTION INTRAMUSCULAR; INTRAVENOUS at 12:03

## 2023-01-16 RX ADMIN — SODIUM CHLORIDE, SODIUM LACTATE, POTASSIUM CHLORIDE, AND CALCIUM CHLORIDE 125 ML/HR: 600; 310; 30; 20 INJECTION, SOLUTION INTRAVENOUS at 12:07

## 2023-01-16 RX ADMIN — CEFAZOLIN SODIUM 1000 MG: 1 SOLUTION INTRAVENOUS at 09:41

## 2023-01-16 RX ADMIN — SODIUM CHLORIDE, SODIUM LACTATE, POTASSIUM CHLORIDE, AND CALCIUM CHLORIDE: 600; 310; 30; 20 INJECTION, SOLUTION INTRAVENOUS at 10:16

## 2023-01-16 RX ADMIN — ONDANSETRON 4 MG: 2 INJECTION INTRAMUSCULAR; INTRAVENOUS at 11:16

## 2023-01-16 NOTE — INTERVAL H&P NOTE
H&P reviewed  After examining the patient I find no changes in the patients condition since the H&P had been written      Vitals:    01/16/23 0750   BP: 132/71   Pulse: 71   Resp: 18   Temp: 97 9 °F (36 6 °C)   SpO2: 99%

## 2023-01-16 NOTE — ANESTHESIA PREPROCEDURE EVALUATION
Procedure:  CHOLECYSTECTOMY LAP (Abdomen)    Relevant Problems   CARDIO   (+) Migraine with aura and without status migrainosus, not intractable      GI/HEPATIC   (+) Gastroesophageal reflux disease with esophagitis without hemorrhage      HEMATOLOGY   (+) Iron deficiency anemia due to chronic blood loss      MUSCULOSKELETAL   (+) Chronic midline low back pain without sciatica      NEURO/PSYCH   (+) Chronic midline low back pain without sciatica   (+) Chronic pain disorder   (+) History of intermenstrual bleeding   (+) Migraine with aura and without status migrainosus, not intractable   (+) Seizures (HCC)      PULMONARY   (+) Cigarette smoker      Digestive   (+) Calculus of gallbladder with chronic cholecystitis without obstruction        Physical Exam    Airway    Mallampati score: II  TM Distance: >3 FB  Neck ROM: full     Dental   No notable dental hx     Cardiovascular  Rhythm: regular, Rate: normal, Cardiovascular exam normal    Pulmonary  Pulmonary exam normal Breath sounds clear to auscultation,     Other Findings        Anesthesia Plan  ASA Score- 2     Anesthesia Type- general with ASA Monitors  Additional Monitors:   Airway Plan: ETT  Plan Factors-    Chart reviewed  Existing labs reviewed  Patient summary reviewed  Patient is a current smoker  Patient instructed to abstain from smoking on day of procedure  Patient smoked on day of surgery  There is medical exclusion for perioperative obstructive sleep apnea risk education  Induction- intravenous  Postoperative Plan-     Informed Consent- Anesthetic plan and risks discussed with patient

## 2023-01-16 NOTE — OP NOTE
OPERATIVE REPORT  PATIENT NAME: Sun Frey    :  1980  MRN: [id number]  Pt Location: [facility] OR ROOM 03    SURGERY DATE: 2023    Surgeon(s) and Role:     * Amber Zamorano MD - Primary     * Howie Turner MD - Assisting    Preop Diagnosis:  Calculus of gallbladder with acute and chronic cholecystitis without obstruction [K80 12]    Post-Op Diagnosis Codes:     * Calculus of gallbladder with acute and chronic cholecystitis without obstruction [K80 12]    Procedure(s) (LRB):  CHOLECYSTECTOMY LAP (N/A)    Specimen(s):  ID Type Source Tests Collected by Time Destination   1 : GALLBLADDER Tissue Gallbladder TISSUE Abdoul Dewitt MD 2023 1105        Estimated Blood Loss:   20 mL    Drains:  * No LDAs found *    Anesthesia Type:   General    Operative Indications:  Calculus of gallbladder with acute and chronic cholecystitis without obstruction [K80 12]    Operative Findings:  Intrahepatic retracted gallbladder  Critical view of safety achieved  Cystic duct and cystic artery identified as two and only two structures entering the gallbladder  Triply clipped and excised  Complications:   None    Procedure and Technique:  Patient was identified in the preoperative holding area taken to the operating room in stable condition  Upon arrival to the operating room identified verbally and via wristband laid supine on the operating table induced with general anesthesia airway was secured with endotracheal intubation per anesthesia team   Both arms were extended pressure points were padded abdomen was prepped and draped in the regular sterile fashion  Timeout was called and all were in agreement    Attention was on superior to the umbilicus a generous amount of local anesthesia was injected down to the level of fascia, 1 cm longitudinal skin incision was made just superior to the umbilicus, subcutaneous tissues were dissected with 2 Kocher forceps, fascia was grasped retracted anteriorly, fascia was opened with scalpel, and 5 mm port was placed into the abdomen  Abdomen was insufflated to 15 mmHg  Patient was placed in reverse Trendelenburg with right shoulder up position, and 2 right upper quadrant 5 mm ports and a 5 mm subxiphoid port were placed under direct vision  Supraumbilical 5 mm port was exchanged for an 11 mm port  Gallbladder was then retracted cephalad and laterally  Calot node was dissected inferiorly, and peritoneal attachment was taken down along the medial and lateral aspects of the gallbladder  Cystic duct and cystic artery were circumferentially skeletonized, and critical view of safety was achieved  2 and only 2 structures named as the cystic duct and cystic artery were identified entering the gallbladder, and liver was clearly identified high these 2 structures  Cystic duct and cystic artery were triply clipped and excised with Endo scissors  Gallbladder was then dissected free of the liver bed with Bovie electrocautery  And placed in a Endobag  Removed from the supraumbilical port  Handed off for standard formalin pathology  Supraumbilical port fascia was closed with figure-of-eight 0 Vicryl suture using the Endo closure device  Gall bladder fossa Morison's pouch were irrigated with normal saline solution  Cystic duct and cystic artery clips were in place  Liver bed was inspected there was no bleeding or bile spillage abdomen was desufflated, and remaining ports were removed under direct vision  Skin was closed with 4-0 Monocryl  Exofin skin glue was applied  Patient was awoken from anesthesia, extubated, taken to PACU in stable condition  Dr Shantal Sanchez was present for the entire procedure     I was present for the entire procedure    Patient Disposition:  PACU         SIGNATURE: Flores Patel MD  DATE: January 16, 2023  TIME: 11:44 AM

## 2023-01-16 NOTE — ANESTHESIA POSTPROCEDURE EVALUATION
Post-Op Assessment Note    CV Status:  Stable  Pain Score: 3    Pain management: adequate     Mental Status:  Alert and awake   Hydration Status:  Euvolemic and stable   PONV Controlled:  Controlled   Airway Patency:  Patent      Post Op Vitals Reviewed: Yes      Staff: Anesthesiologist         No notable events documented      /67 (01/16/23 1240)    Temp (!) 97 1 °F (36 2 °C) (01/16/23 1240)    Pulse 70 (01/16/23 1240)   Resp 16 (01/16/23 1240)    SpO2 99 % (01/16/23 1240)

## 2023-01-25 ENCOUNTER — TELEPHONE (OUTPATIENT)
Dept: SURGERY | Facility: CLINIC | Age: 43
End: 2023-01-25

## 2023-01-26 NOTE — PROGRESS NOTES
Assessment/Plan:   Caren Long is a 43 y  o female who comes in today for postoperative check after laparoscopic cholecystectomy on 1/16/23 with Dr Dagmar Rowley  Patient is pleased with the outcome of surgery and is doing well  Pathology: Pathology reviewed with patient, all questions answered  Final Diagnosis   A  Gallbladder:  - Acute and chronic cholecystitis, and cholelithiasis  ______________________________________________________  HPI:  Caren Long is a 43 y  o female who comes in today for postoperative check after recent laparoscopic cholecystectomy on 1/16/23 with Dr Dagmar Rowley  Currently doing well without problems, no fever or chills,no nausea and no vomiting  Patient reports they have resumed their normal diet  denies biliary diarrhea  Reports no issues  Some pain at umbilicus  ROS:  General ROS: negative for - chills, fatigue, fever or night sweats, weight loss  Respiratory ROS: no cough, shortness of breath, or wheezing  Cardiovascular ROS: no chest pain or dyspnea on exertion  Genito-Urinary ROS: no dysuria, trouble voiding, or hematuria  Musculoskeletal ROS: negative for - gait disturbance, joint pain or muscle pain  Neurological ROS: no TIA or stroke symptoms  GI ROS: see HPI  Skin ROS: no new rashes or lesions   Lymphatic ROS: no new adenopathy noted by pt     Psy ROS: no new mental or behavioral disturbances       Patient Active Problem List   Diagnosis   • Chronic midline low back pain without sciatica   • Iron deficiency anemia due to chronic blood loss   • Menorrhagia with regular cycle   • Migraine with aura and without status migrainosus, not intractable   • Tobacco use   • Cyst of skin and subcutaneous tissue   • Soft tissue mass   • Encounter for gynecological examination with abnormal finding   • History of intermenstrual bleeding   • Trichimoniasis   • Epigastric pain   • Calculus of gallbladder with chronic cholecystitis without obstruction   • Gastroesophageal reflux disease with esophagitis without hemorrhage   • Seizures (HCC)   • Chronic pain disorder   • Cigarette smoker           Allergies:  Patient has no known allergies  Current Outpatient Medications:   •  metroNIDAZOLE (FLAGYL) 250 mg tablet, Take by mouth every 6 (six) hours, Disp: , Rfl:   •  omeprazole (PriLOSEC) 40 MG capsule, Take 1 capsule (40 mg total) by mouth daily, Disp: 30 capsule, Rfl: 2    Past Medical History:   Diagnosis Date   • Anemia     iron def   • Chronic pain disorder     back   • Migraines    • Pneumonia 2018   • Seasonal allergies    • Seizure ([facility] [address] )    • Seizures ([facility] [address] )     38 years ago       Past Surgical History:   Procedure Laterality Date   • EGD  12/15/2022    done by Brody Mccloud MD   • IN EXC TUMOR SOFT TISSUE THIGH/KNEE [address] 5 CM/> Right 02/03/2020    Procedure: EXCISIONAL BIOPSY SOFT  TISSUE MASS RIGHT KNEE;  Surgeon: Weston Conn MD;  Location: [address] [place] [address];  Service: Orthopedics   • IN LAPAROSCOPY SURG CHOLECYSTECTOMY N/A 1/16/2023    Procedure: CHOLECYSTECTOMY LAP;  Surgeon: Amber Zamorano MD;  Location: [facility] OR;  Service: General   • TUBAL LIGATION         Family History   Problem Relation Age of Onset   • Heart disease Mother    • Hypertension Mother         reports that she has been smoking cigarettes  She has been smoking an average of  25 packs per day  She has never used smokeless tobacco  She reports that she does not drink alcohol and does not use drugs      Vitals:    01/31/23 1301   BP: 108/76   Pulse: 72   Resp: 16   Temp: (!) 96 5 °F (35 8 °C)       PHYSICAL EXAM  General: normal, cooperative, no distress  Abdominal: soft, nondistended or nontender  Incision: clean, dry, and intact and healing well        Lino Kincaid PA-C      Date: 1/31/2023 Time: 1:05 PM

## 2023-01-31 ENCOUNTER — OFFICE VISIT (OUTPATIENT)
Dept: SURGERY | Facility: CLINIC | Age: 43
End: 2023-01-31

## 2023-01-31 VITALS
BODY MASS INDEX: 20.5 KG/M2 | HEIGHT: 71 IN | DIASTOLIC BLOOD PRESSURE: 76 MMHG | TEMPERATURE: 96.5 F | RESPIRATION RATE: 16 BRPM | HEART RATE: 72 BPM | SYSTOLIC BLOOD PRESSURE: 108 MMHG | WEIGHT: 146.4 LBS

## 2023-01-31 DIAGNOSIS — Z90.49 S/P LAPAROSCOPIC CHOLECYSTECTOMY: Primary | ICD-10-CM

## 2023-05-22 ENCOUNTER — OFFICE VISIT (OUTPATIENT)
Dept: FAMILY MEDICINE CLINIC | Facility: CLINIC | Age: 43
End: 2023-05-22

## 2023-05-22 ENCOUNTER — LAB (OUTPATIENT)
Dept: LAB | Facility: CLINIC | Age: 43
End: 2023-05-22

## 2023-05-22 VITALS
BODY MASS INDEX: 22.4 KG/M2 | WEIGHT: 160 LBS | OXYGEN SATURATION: 99 % | HEIGHT: 71 IN | HEART RATE: 75 BPM | RESPIRATION RATE: 18 BRPM | DIASTOLIC BLOOD PRESSURE: 64 MMHG | TEMPERATURE: 98.2 F | SYSTOLIC BLOOD PRESSURE: 102 MMHG

## 2023-05-22 DIAGNOSIS — F32.1 CURRENT MODERATE EPISODE OF MAJOR DEPRESSIVE DISORDER, UNSPECIFIED WHETHER RECURRENT (HCC): Primary | ICD-10-CM

## 2023-05-22 DIAGNOSIS — D50.0 IRON DEFICIENCY ANEMIA DUE TO CHRONIC BLOOD LOSS: ICD-10-CM

## 2023-05-22 DIAGNOSIS — Z00.00 HEALTHCARE MAINTENANCE: ICD-10-CM

## 2023-05-22 LAB
BASOPHILS # BLD AUTO: 0.03 THOUSANDS/ÂΜL (ref 0–0.1)
BASOPHILS NFR BLD AUTO: 1 % (ref 0–1)
EOSINOPHIL # BLD AUTO: 0.21 THOUSAND/ÂΜL (ref 0–0.61)
EOSINOPHIL NFR BLD AUTO: 4 % (ref 0–6)
ERYTHROCYTE [DISTWIDTH] IN BLOOD BY AUTOMATED COUNT: 14.1 % (ref 11.6–15.1)
FERRITIN SERPL-MCNC: 15 NG/ML (ref 11–307)
HCT VFR BLD AUTO: 41.3 % (ref 34.8–46.1)
HGB BLD-MCNC: 12.5 G/DL (ref 11.5–15.4)
IMM GRANULOCYTES # BLD AUTO: 0.01 THOUSAND/UL (ref 0–0.2)
IMM GRANULOCYTES NFR BLD AUTO: 0 % (ref 0–2)
IRON SATN MFR SERPL: 17 % (ref 15–50)
IRON SERPL-MCNC: 57 UG/DL (ref 50–170)
LYMPHOCYTES # BLD AUTO: 1.85 THOUSANDS/ÂΜL (ref 0.6–4.47)
LYMPHOCYTES NFR BLD AUTO: 36 % (ref 14–44)
MCH RBC QN AUTO: 25.1 PG (ref 26.8–34.3)
MCHC RBC AUTO-ENTMCNC: 30.3 G/DL (ref 31.4–37.4)
MCV RBC AUTO: 83 FL (ref 82–98)
MONOCYTES # BLD AUTO: 0.33 THOUSAND/ÂΜL (ref 0.17–1.22)
MONOCYTES NFR BLD AUTO: 6 % (ref 4–12)
NEUTROPHILS # BLD AUTO: 2.74 THOUSANDS/ÂΜL (ref 1.85–7.62)
NEUTS SEG NFR BLD AUTO: 53 % (ref 43–75)
NRBC BLD AUTO-RTO: 0 /100 WBCS
PLATELET # BLD AUTO: 230 THOUSANDS/UL (ref 149–390)
PMV BLD AUTO: 11.4 FL (ref 8.9–12.7)
RBC # BLD AUTO: 4.99 MILLION/UL (ref 3.81–5.12)
TIBC SERPL-MCNC: 344 UG/DL (ref 250–450)
TSH SERPL DL<=0.05 MIU/L-ACNC: 1.58 UIU/ML (ref 0.45–4.5)
WBC # BLD AUTO: 5.17 THOUSAND/UL (ref 4.31–10.16)

## 2023-05-22 RX ORDER — SERTRALINE HYDROCHLORIDE 25 MG/1
25 TABLET, FILM COATED ORAL DAILY
Qty: 30 TABLET | Refills: 5 | Status: SHIPPED | OUTPATIENT
Start: 2023-05-22 | End: 2023-11-18

## 2023-05-22 NOTE — ASSESSMENT & PLAN NOTE
PHQ-2/9 Depression Screening    Little interest or pleasure in doing things: 1 - several days  Feeling down, depressed, or hopeless: 2 - more than half the days  Trouble falling or staying asleep, or sleeping too much: 1 - several days  Feeling tired or having little energy: 2 - more than half the days  Poor appetite or overeatin - several days  Feeling bad about yourself - or that you are a failure or have let yourself or your family down: 1 - several days  Trouble concentrating on things, such as reading the newspaper or watching television: 2 - more than half the days  Moving or speaking so slowly that other people could have noticed  Or the opposite - being so fidgety or restless that you have been moving around a lot more than usual: 1 - several days  Thoughts that you would be better off dead, or of hurting yourself in some way: 0 - not at all  PHQ-2 Score: 3  PHQ-2 Interpretation: POSITIVE depression screen  PHQ-9 Score: 11   PHQ-9 Interpretation: Moderate depression        MH resource sheet and referral for psych provided  Counseling given   Discussed medication side effects and treatment expectations   Start Zoloft 25 mg daily   Follow in 8 weeks for treatment impact assessment

## 2023-05-22 NOTE — ASSESSMENT & PLAN NOTE
Patient reports history of iron deficiency orders placed in Nov patient did not obtain   CBC - future   Iron studies -future

## 2023-05-22 NOTE — PROGRESS NOTES
Name: Aishwarya Love      : 1980      MRN: [id number]  Encounter Provider: KIM Hartley  Encounter Date: 2023   Encounter department: [address] [place] [address]     1  Current moderate episode of major depressive disorder, unspecified whether recurrent ([facility] )  Assessment & Plan:  PHQ-2/9 Depression Screening    Little interest or pleasure in doing things: 1 - several days  Feeling down, depressed, or hopeless: 2 - more than half the days  Trouble falling or staying asleep, or sleeping too much: 1 - several days  Feeling tired or having little energy: 2 - more than half the days  Poor appetite or overeatin - several days  Feeling bad about yourself - or that you are a failure or have let yourself or your family down: 1 - several days  Trouble concentrating on things, such as reading the newspaper or watching television: 2 - more than half the days  Moving or speaking so slowly that other people could have noticed  Or the opposite - being so fidgety or restless that you have been moving around a lot more than usual: 1 - several days  Thoughts that you would be better off dead, or of hurting yourself in some way: 0 - not at all  PHQ-2 Score: 3  PHQ-2 Interpretation: POSITIVE depression screen  PHQ-9 Score: 11   PHQ-9 Interpretation: Moderate depression        MH resource sheet and referral for psych provided  Counseling given   Discussed medication side effects and treatment expectations   Start Zoloft 25 mg daily   Follow in 8 weeks for treatment impact assessment  Orders:  -     TSH, 3rd generation with Free T4 reflex; Future  -     Ambulatory Referral to Psychiatry; Future  -     sertraline (ZOLOFT) 25 mg tablet; Take 1 tablet (25 mg total) by mouth daily    2   Iron deficiency anemia due to chronic blood loss  Assessment & Plan:  Patient reports history of iron deficiency orders placed in Nov patient did not obtain   CBC - future   Iron studies -future    Orders:  -     CBC and differential; Future  -     Iron Panel (Includes Ferritin, Iron Sat%, Iron, and TIBC); Future    3  Healthcare maintenance  -     TSH, 3rd generation with Free T4 reflex; Future         Subjective      Rosalino Fail 37 y o  female  has a past medical history of Anemia, Chronic pain disorder, Migraines, Pneumonia (2018), Seasonal allergies, Seizure ([facility] [address] and Seizures ([facility] [address] )  Presenting today for evaluation of depression; patient reports going through increased episodes of depression and feels needs to see a therapist  Currently father was diagnosed with CA and she is struggling with all the responsibilities that would fall on her if she were to go see him  Has prior family member die for CA a few years ago and is reliving those moments  Otherwise patient has not complaints  Denies fatigue, headaches, dizziness, blurred vision, nausea, palpitation, chest pain, SOB, urinary changes, weakness, bowel changes, sleep problems,  sick contacts, red flag signs,  or recent travel  Overall patient reports feeling well   Patient has no further complaints other than what is mentioned in the ROS  Depression  This is a new problem  The current episode started more than 1 year ago  The problem occurs intermittently  The problem has been waxing and waning  Associated symptoms include anorexia  Pertinent negatives include no abdominal pain, arthralgias, chest pain, chills, coughing, fever, headaches, rash, sore throat, vertigo or vomiting  The symptoms are aggravated by stress  She has tried nothing for the symptoms  The treatment provided no relief  Review of Systems   Constitutional: Negative for chills and fever  HENT: Negative for ear pain and sore throat  Eyes: Negative for pain and visual disturbance  Respiratory: Negative for cough and shortness of breath  Cardiovascular: Negative for chest pain and palpitations  Gastrointestinal: Positive for anorexia  "Negative for abdominal pain and vomiting  Genitourinary: Negative for dysuria and hematuria  Musculoskeletal: Negative for arthralgias and back pain  Skin: Negative for color change and rash  Neurological: Negative for vertigo, seizures, syncope and headaches  Psychiatric/Behavioral: Positive for depression and sleep disturbance  Negative for suicidal ideas  Depression   All other systems reviewed and are negative  Current Outpatient Medications on File Prior to Visit   Medication Sig   • metroNIDAZOLE (FLAGYL) 250 mg tablet Take by mouth every 6 (six) hours   • omeprazole (PriLOSEC) 40 MG capsule Take 1 capsule (40 mg total) by mouth daily       Objective     /64 (BP Location: Left arm, Patient Position: Sitting, Cuff Size: Standard)   Pulse 75   Temp 98 2 °F (36 8 °C) (Temporal)   Resp 18   Ht 5' 11\" (1 803 m)   Wt 72 6 kg (160 lb)   LMP 05/15/2023 (Exact Date)   SpO2 99%   BMI 22 32 kg/m²     Physical Exam  Vitals and nursing note reviewed  Constitutional:       General: She is not in acute distress  Appearance: Normal appearance  She is not ill-appearing  HENT:      Head: Normocephalic and atraumatic  Right Ear: External ear normal       Left Ear: External ear normal       Nose: Nose normal       Mouth/Throat:      Mouth: Mucous membranes are moist    Eyes:      General:         Right eye: No discharge  Left eye: No discharge  Pupils: Pupils are equal, round, and reactive to light  Cardiovascular:      Rate and Rhythm: Normal rate and regular rhythm  Pulses: Normal pulses  Heart sounds: Normal heart sounds  Pulmonary:      Effort: Pulmonary effort is normal  No respiratory distress  Breath sounds: Normal breath sounds  No wheezing  Abdominal:      General: Bowel sounds are normal       Palpations: Abdomen is soft  Tenderness: There is no abdominal tenderness  There is no right CVA tenderness or left CVA tenderness   " Musculoskeletal:         General: Normal range of motion  Cervical back: Normal range of motion  Skin:     General: Skin is warm and dry  Neurological:      General: No focal deficit present  Mental Status: She is alert and oriented to person, place, and time  Psychiatric:         Mood and Affect: Affect is flat  Speech: Speech normal          Behavior: Behavior is withdrawn  Thought Content: Thought content does not include suicidal ideation  Thought content does not include suicidal plan         KIM Barros

## 2023-05-22 NOTE — PATIENT INSTRUCTIONS
Call [phone] [address] [facility] [place] [address]  [employer]  [phone]

## 2023-05-24 ENCOUNTER — TELEPHONE (OUTPATIENT)
Dept: PSYCHIATRY | Facility: CLINIC | Age: 43
End: 2023-05-24

## 2023-05-24 NOTE — TELEPHONE ENCOUNTER
Spoke to patient in regards to routine referral and placing patient on proper wait list, patient stated she has services outside of our office   Closing out referral

## 2023-07-31 ENCOUNTER — HOSPITAL ENCOUNTER (EMERGENCY)
Facility: HOSPITAL | Age: 43
Discharge: HOME | End: 2023-07-31
Attending: EMERGENCY MEDICINE
Payer: MEDICARE

## 2023-07-31 VITALS
WEIGHT: 163.14 LBS | BODY MASS INDEX: 22.75 KG/M2 | DIASTOLIC BLOOD PRESSURE: 72 MMHG | TEMPERATURE: 98.4 F | HEART RATE: 69 BPM | RESPIRATION RATE: 18 BRPM | OXYGEN SATURATION: 99 % | SYSTOLIC BLOOD PRESSURE: 101 MMHG

## 2023-07-31 DIAGNOSIS — H00.014 HORDEOLUM EXTERNUM OF LEFT UPPER EYELID: Primary | ICD-10-CM

## 2023-07-31 PROCEDURE — 99282 EMERGENCY DEPT VISIT SF MDM: CPT

## 2023-07-31 PROCEDURE — 99284 EMERGENCY DEPT VISIT MOD MDM: CPT

## 2023-07-31 NOTE — ED PROVIDER NOTES
History  Chief Complaint   Patient presents with   • Eye Problem     Pt c/o left eyelid bump for 1.5 weeks     Mau Magana is a 45-year-old female with no pertinent past medical history presenting to the emergency department for left eyelid bump for about a week and a half. Has gotten this in the past, usually warm presses and massages resolve it, but it is not working for this episode. Admits to increased stress, denies wearing make-up or false eyelashes. Did try a new face wash, but otherwise no new products. The only vision change discussed is an annoying feeling because she can see the lump in her visual field. No drainage or current pain, but at times will hurt or itch. No fevers, chills. Was going to schedule an appointment with ophthalmology, but would have had to make an appointment and wait. Prior to Admission Medications   Prescriptions Last Dose Informant Patient Reported?  Taking?   metroNIDAZOLE (FLAGYL) 250 mg tablet   Yes No   Sig: Take by mouth every 6 (six) hours   omeprazole (PriLOSEC) 40 MG capsule  Self No No   Sig: Take 1 capsule (40 mg total) by mouth daily   sertraline (ZOLOFT) 25 mg tablet   No No   Sig: Take 1 tablet (25 mg total) by mouth daily      Facility-Administered Medications: None       Past Medical History:   Diagnosis Date   • Anemia     iron def   • Chronic pain disorder     back   • Migraines    • Pneumonia 2018   • Seasonal allergies    • Seizure ([address])    • Seizures ([address])     38 years ago       Past Surgical History:   Procedure Laterality Date   • EGD  12/15/2022    done by Ino Ruelas MD   • IA EXC TUMOR SOFT TISSUE THIGH/KNEE [facility] 5 CM/> Right 02/03/2020    Procedure: EXCISIONAL BIOPSY SOFT  TISSUE MASS RIGHT KNEE;  Surgeon: Marcelina Hamilton MD;  Location: [address] [place] [address] MAIN OR;  Service: Orthopedics   • IA LAPAROSCOPY SURG CHOLECYSTECTOMY N/A 1/16/2023    Procedure: CHOLECYSTECTOMY LAP;  Surgeon: Kd Cotter MD;  Location: [facility] Main OR;  Service: General   • TUBAL LIGATION         Family History   Problem Relation Age of Onset   • Heart disease Mother    • Hypertension Mother      I have reviewed and agree with the history as documented. E-Cigarette/Vaping   • E-Cigarette Use Never User      E-Cigarette/Vaping Substances   • Nicotine No    • THC No    • CBD No    • Flavoring No    • Other No    • Unknown No      Social History     Tobacco Use   • Smoking status: Every Day     Packs/day: 0.25     Types: Cigarettes     Passive exposure: Current   • Smokeless tobacco: Never   Vaping Use   • Vaping Use: Never used   Substance Use Topics   • Alcohol use: No   • Drug use: No       Review of Systems   Constitutional: Negative for chills and fever. HENT: Negative for congestion, ear pain and rhinorrhea. Eyes: Positive for pain, itching and visual disturbance. Negative for photophobia, discharge and redness. Respiratory: Negative for cough and shortness of breath. Cardiovascular: Negative for chest pain and palpitations. Gastrointestinal: Negative for nausea and vomiting. Musculoskeletal: Negative for myalgias, neck pain and neck stiffness. Skin: Negative for rash. Neurological: Negative for light-headedness and headaches. Hematological: Negative for adenopathy. Physical Exam  Physical Exam  Vitals reviewed. Constitutional:       General: She is not in acute distress. Appearance: Normal appearance. She is not ill-appearing, toxic-appearing or diaphoretic. HENT:      Head: Normocephalic and atraumatic. Right Ear: External ear normal.      Left Ear: External ear normal.      Nose: Nose normal.      Mouth/Throat:      Mouth: Mucous membranes are moist.      Pharynx: Oropharynx is clear. No oropharyngeal exudate or posterior oropharyngeal erythema. Eyes:      General: Lids are everted, no foreign bodies appreciated. Vision grossly intact. No allergic shiner, visual field deficit or scleral icterus.         Right eye: No foreign body, discharge or hordeolum. Left eye: Hordeolum present. No foreign body or discharge. Extraocular Movements: Extraocular movements intact. Conjunctiva/sclera: Conjunctivae normal.      Pupils: Pupils are equal, round, and reactive to light. Comments:   External hordeolum noted to the upper outer lid on the left eye. Small key noted. No active drainage. Cardiovascular:      Rate and Rhythm: Normal rate and regular rhythm. Pulmonary:      Effort: Pulmonary effort is normal.      Breath sounds: Normal breath sounds. Musculoskeletal:         General: Normal range of motion. Cervical back: Normal range of motion and neck supple. No rigidity or tenderness. Lymphadenopathy:      Cervical: No cervical adenopathy. Skin:     General: Skin is warm and dry. Capillary Refill: Capillary refill takes less than 2 seconds. Neurological:      General: No focal deficit present. Mental Status: She is alert. Psychiatric:         Mood and Affect: Mood normal.         Behavior: Behavior normal.         Vital Signs  ED Triage Vitals [07/31/23 1129]   Temperature Pulse Respirations Blood Pressure SpO2   98.4 °F (36.9 °C) 69 18 101/72 99 %      Temp Source Heart Rate Source Patient Position - Orthostatic VS BP Location FiO2 (%)   Oral -- -- -- --      Pain Score       7           Vitals:    07/31/23 1129   BP: 101/72   Pulse: 69         Visual Acuity      ED Medications  Medications - No data to display    Diagnostic Studies  Results Reviewed     None                 No orders to display              Procedures  Procedures         ED Course                               SBIRT 20yo+    Flowsheet Row Most Recent Value   Initial Alcohol Screen: US AUDIT-C     1. How often do you have a drink containing alcohol? 0 Filed at: 07/31/2023 1134   2. How many drinks containing alcohol do you have on a typical day you are drinking? 0 Filed at: 07/31/2023 1134   3b. FEMALE Any Age, or MALE 65+:  How often do you have 4 or more drinks on one occassion? 0 Filed at: 07/31/2023 1134   Audit-C Score 0 Filed at: 07/31/2023 1134   [place]: How many times in the past year have you. .. Used an illegal drug or used a prescription medication for non-medical reasons? Never Filed at: 07/31/2023 1134                    Medical Decision Making  51-year-old female presenting with a hordeolum to the external left eye x 1.5 weeks. Her normal treatment is not working, and is now concerned because there is a key. Vision is unaffected. Discussed supportive care to include warm compresses and massages, I did prescribe a topical antibiotic due to key and surrounding irritation. Recommended follow-up with ophthalmology. Pt stable at time of discharge, vital signs reviewed, questions answered. Strict ER return precautions provided/discussed and were well understood by patient. A verbal dictation device was used in the writing of this note. Please excuse any grammatical errors or transposition of look a like/sound a like words. Hordeolum externum of left upper eyelid: acute illness or injury  Risk  Prescription drug management. Disposition  Final diagnoses:   Hordeolum externum of left upper eyelid     Time reflects when diagnosis was documented in both MDM as applicable and the Disposition within this note     Time User Action Codes Description Comment    7/31/2023 11:55 AM Neno Kang Add [H00.014] Hordeolum externum of left upper eyelid       ED Disposition     ED Disposition   Discharge    Condition   Stable    Date/Time   Mon Jul 31, 2023 11:54 AM    Comment   [address] discharge to home/self care.                Follow-up Information     Follow up With Specialties Details Why Contact Info Additional Information    Nevin Delarosa MD Family Medicine Schedule an appointment as soon as possible for a visit  As needed [address] [place] [facility] Emergency Department Emergency Medicine Go to  If symptoms worsen [address] [id number]  [address] [facility] Emergency Department, [address] [facility], [place], [place], [address]          Discharge Medication List as of 7/31/2023 11:58 AM      START taking these medications    Details   tobramycin (TOBREX) 0.3 % OINT Administer 0.5 inches into the left eye 3 (three) times a day for 5 days, Starting Mon 7/31/2023, Until Sat 8/5/2023, Normal         CONTINUE these medications which have NOT CHANGED    Details   metroNIDAZOLE (FLAGYL) 250 mg tablet Take by mouth every 6 (six) hours, Historical Med      omeprazole (PriLOSEC) 40 MG capsule Take 1 capsule (40 mg total) by mouth daily, Starting Tue 11/29/2022, Normal      sertraline (ZOLOFT) 25 mg tablet Take 1 tablet (25 mg total) by mouth daily, Starting Mon 5/22/2023, Until Sat 11/18/2023, Normal             No discharge procedures on file.     PDMP Review       Value Time User    PDMP Reviewed  Yes 2/4/2020 10:31 AM Nerissa Cervantes MD          ED Provider  Electronically Signed by           Fabian Edgar PA-C  07/31/23 [id number]

## 2023-07-31 NOTE — DISCHARGE INSTRUCTIONS
Warm compresses to the eye for 5-10 minutes 4 times per day followed by eyelid massages to express the inflammation and facilitate drainage. Antibiotic ointment application 3 times per day. If persists, contact your ophthalmologist.  Return to the ER for: fevers, worsening pain, visual disturbances, worsening or concerning symptoms.

## 2023-09-16 ENCOUNTER — APPOINTMENT (OUTPATIENT)
Dept: URGENT CARE | Age: 43
End: 2023-09-16
Payer: OTHER MISCELLANEOUS

## 2023-09-16 PROCEDURE — 99284 EMERGENCY DEPT VISIT MOD MDM: CPT

## 2023-09-16 PROCEDURE — G0383 LEV 4 HOSP TYPE B ED VISIT: HCPCS

## 2023-09-22 ENCOUNTER — APPOINTMENT (OUTPATIENT)
Dept: URGENT CARE | Age: 43
End: 2023-09-22
Payer: OTHER MISCELLANEOUS

## 2023-09-22 PROCEDURE — 99213 OFFICE O/P EST LOW 20 MIN: CPT | Performed by: PHYSICIAN ASSISTANT

## 2023-09-29 ENCOUNTER — APPOINTMENT (OUTPATIENT)
Dept: URGENT CARE | Age: 43
End: 2023-09-29
Payer: OTHER MISCELLANEOUS

## 2023-09-29 PROCEDURE — 99213 OFFICE O/P EST LOW 20 MIN: CPT | Performed by: PHYSICIAN ASSISTANT

## 2023-10-01 ENCOUNTER — HOSPITAL ENCOUNTER (EMERGENCY)
Facility: HOSPITAL | Age: 43
Discharge: HOME | End: 2023-10-01
Attending: EMERGENCY MEDICINE
Payer: MEDICARE

## 2023-10-01 ENCOUNTER — APPOINTMENT (EMERGENCY)
Dept: CT IMAGING | Facility: HOSPITAL | Age: 43
End: 2023-10-01
Payer: MEDICARE

## 2023-10-01 VITALS
HEIGHT: 71 IN | SYSTOLIC BLOOD PRESSURE: 110 MMHG | DIASTOLIC BLOOD PRESSURE: 75 MMHG | HEART RATE: 61 BPM | TEMPERATURE: 97.9 F | OXYGEN SATURATION: 98 % | RESPIRATION RATE: 18 BRPM | WEIGHT: 160.72 LBS | BODY MASS INDEX: 22.5 KG/M2

## 2023-10-01 DIAGNOSIS — R51.9 HEADACHE: ICD-10-CM

## 2023-10-01 DIAGNOSIS — R42 DIZZINESS: Primary | ICD-10-CM

## 2023-10-01 LAB
ALBUMIN SERPL BCP-MCNC: 3.9 G/DL (ref 3.5–5)
ALP SERPL-CCNC: 62 U/L (ref 34–104)
ALT SERPL W P-5'-P-CCNC: 8 U/L (ref 7–52)
ANION GAP SERPL CALCULATED.3IONS-SCNC: 4 MMOL/L
AST SERPL W P-5'-P-CCNC: 15 U/L (ref 13–39)
ATRIAL RATE: 74 BPM
BACTERIA UR QL AUTO: ABNORMAL /HPF
BASOPHILS # BLD AUTO: 0.03 THOUSANDS/ÂΜL (ref 0–0.1)
BASOPHILS NFR BLD AUTO: 1 % (ref 0–1)
BILIRUB SERPL-MCNC: 0.38 MG/DL (ref 0.2–1)
BILIRUB UR QL STRIP: NEGATIVE
BUN SERPL-MCNC: 10 MG/DL (ref 5–25)
CALCIUM SERPL-MCNC: 8.9 MG/DL (ref 8.4–10.2)
CARDIAC TROPONIN I PNL SERPL HS: <2 NG/L
CHLORIDE SERPL-SCNC: 107 MMOL/L (ref 96–108)
CLARITY UR: CLEAR
CO2 SERPL-SCNC: 26 MMOL/L (ref 21–32)
COLOR UR: YELLOW
CREAT SERPL-MCNC: 0.79 MG/DL (ref 0.6–1.3)
EOSINOPHIL # BLD AUTO: 0.25 THOUSAND/ÂΜL (ref 0–0.61)
EOSINOPHIL NFR BLD AUTO: 4 % (ref 0–6)
ERYTHROCYTE [DISTWIDTH] IN BLOOD BY AUTOMATED COUNT: 14.5 % (ref 11.6–15.1)
EXT PREGNANCY TEST URINE: NEGATIVE
EXT. CONTROL: NORMAL
GFR SERPL CREATININE-BSD FRML MDRD: 91 ML/MIN/1.73SQ M
GLUCOSE SERPL-MCNC: 102 MG/DL (ref 65–140)
GLUCOSE SERPL-MCNC: 116 MG/DL (ref 65–140)
GLUCOSE UR STRIP-MCNC: NEGATIVE MG/DL
HCT VFR BLD AUTO: 39.8 % (ref 34.8–46.1)
HGB BLD-MCNC: 11.8 G/DL (ref 11.5–15.4)
HGB UR QL STRIP.AUTO: ABNORMAL
IMM GRANULOCYTES # BLD AUTO: 0.01 THOUSAND/UL (ref 0–0.2)
IMM GRANULOCYTES NFR BLD AUTO: 0 % (ref 0–2)
KETONES UR STRIP-MCNC: NEGATIVE MG/DL
LEUKOCYTE ESTERASE UR QL STRIP: NEGATIVE
LYMPHOCYTES # BLD AUTO: 2.26 THOUSANDS/ÂΜL (ref 0.6–4.47)
LYMPHOCYTES NFR BLD AUTO: 40 % (ref 14–44)
MCH RBC QN AUTO: 24.8 PG (ref 26.8–34.3)
MCHC RBC AUTO-ENTMCNC: 29.6 G/DL (ref 31.4–37.4)
MCV RBC AUTO: 84 FL (ref 82–98)
MONOCYTES # BLD AUTO: 0.27 THOUSAND/ÂΜL (ref 0.17–1.22)
MONOCYTES NFR BLD AUTO: 5 % (ref 4–12)
MUCOUS THREADS UR QL AUTO: ABNORMAL
NEUTROPHILS # BLD AUTO: 2.81 THOUSANDS/ÂΜL (ref 1.85–7.62)
NEUTS SEG NFR BLD AUTO: 50 % (ref 43–75)
NITRITE UR QL STRIP: NEGATIVE
NON-SQ EPI CELLS URNS QL MICRO: ABNORMAL /HPF
NRBC BLD AUTO-RTO: 0 /100 WBCS
P AXIS: 56 DEGREES
PH UR STRIP.AUTO: 5.5 [PH] (ref 4.5–8)
PLATELET # BLD AUTO: 220 THOUSANDS/UL (ref 149–390)
PMV BLD AUTO: 10.6 FL (ref 8.9–12.7)
POTASSIUM SERPL-SCNC: 3.6 MMOL/L (ref 3.5–5.3)
PR INTERVAL: 214 MS
PROT SERPL-MCNC: 7.2 G/DL (ref 6.4–8.4)
PROT UR STRIP-MCNC: NEGATIVE MG/DL
QRS AXIS: 36 DEGREES
QRSD INTERVAL: 76 MS
QT INTERVAL: 414 MS
QTC INTERVAL: 459 MS
RBC # BLD AUTO: 4.76 MILLION/UL (ref 3.81–5.12)
RBC #/AREA URNS AUTO: ABNORMAL /HPF
SODIUM SERPL-SCNC: 137 MMOL/L (ref 135–147)
SP GR UR STRIP.AUTO: >=1.03 (ref 1–1.03)
T WAVE AXIS: 15 DEGREES
UROBILINOGEN UR QL STRIP.AUTO: 1 E.U./DL
VENTRICULAR RATE: 74 BPM
WBC # BLD AUTO: 5.63 THOUSAND/UL (ref 4.31–10.16)
WBC #/AREA URNS AUTO: ABNORMAL /HPF

## 2023-10-01 PROCEDURE — 70498 CT ANGIOGRAPHY NECK: CPT

## 2023-10-01 PROCEDURE — 84484 ASSAY OF TROPONIN QUANT: CPT | Performed by: EMERGENCY MEDICINE

## 2023-10-01 PROCEDURE — 93010 ELECTROCARDIOGRAM REPORT: CPT | Performed by: INTERNAL MEDICINE

## 2023-10-01 PROCEDURE — 99285 EMERGENCY DEPT VISIT HI MDM: CPT | Performed by: EMERGENCY MEDICINE

## 2023-10-01 PROCEDURE — 81001 URINALYSIS AUTO W/SCOPE: CPT

## 2023-10-01 PROCEDURE — 70496 CT ANGIOGRAPHY HEAD: CPT

## 2023-10-01 PROCEDURE — 96375 TX/PRO/DX INJ NEW DRUG ADDON: CPT

## 2023-10-01 PROCEDURE — 82948 REAGENT STRIP/BLOOD GLUCOSE: CPT

## 2023-10-01 PROCEDURE — 96361 HYDRATE IV INFUSION ADD-ON: CPT

## 2023-10-01 PROCEDURE — 80053 COMPREHEN METABOLIC PANEL: CPT | Performed by: EMERGENCY MEDICINE

## 2023-10-01 PROCEDURE — 99285 EMERGENCY DEPT VISIT HI MDM: CPT

## 2023-10-01 PROCEDURE — 81025 URINE PREGNANCY TEST: CPT | Performed by: EMERGENCY MEDICINE

## 2023-10-01 PROCEDURE — 93005 ELECTROCARDIOGRAM TRACING: CPT

## 2023-10-01 PROCEDURE — 36415 COLL VENOUS BLD VENIPUNCTURE: CPT | Performed by: EMERGENCY MEDICINE

## 2023-10-01 PROCEDURE — 85025 COMPLETE CBC W/AUTO DIFF WBC: CPT | Performed by: EMERGENCY MEDICINE

## 2023-10-01 PROCEDURE — 96374 THER/PROPH/DIAG INJ IV PUSH: CPT

## 2023-10-01 RX ORDER — ACETAMINOPHEN 325 MG/1
650 TABLET ORAL ONCE
Status: COMPLETED | OUTPATIENT
Start: 2023-10-01 | End: 2023-10-01

## 2023-10-01 RX ORDER — DIPHENHYDRAMINE HYDROCHLORIDE 50 MG/ML
25 INJECTION INTRAMUSCULAR; INTRAVENOUS ONCE
Status: COMPLETED | OUTPATIENT
Start: 2023-10-01 | End: 2023-10-01

## 2023-10-01 RX ORDER — METOCLOPRAMIDE HYDROCHLORIDE 5 MG/ML
10 INJECTION INTRAMUSCULAR; INTRAVENOUS ONCE
Status: COMPLETED | OUTPATIENT
Start: 2023-10-01 | End: 2023-10-01

## 2023-10-01 RX ORDER — KETOROLAC TROMETHAMINE 30 MG/ML
15 INJECTION, SOLUTION INTRAMUSCULAR; INTRAVENOUS ONCE
Status: COMPLETED | OUTPATIENT
Start: 2023-10-01 | End: 2023-10-01

## 2023-10-01 RX ADMIN — DIPHENHYDRAMINE HYDROCHLORIDE 25 MG: 50 INJECTION, SOLUTION INTRAMUSCULAR; INTRAVENOUS at 16:12

## 2023-10-01 RX ADMIN — IOHEXOL 85 ML: 350 INJECTION, SOLUTION INTRAVENOUS at 14:40

## 2023-10-01 RX ADMIN — SODIUM CHLORIDE 1000 ML: 0.9 INJECTION, SOLUTION INTRAVENOUS at 13:15

## 2023-10-01 RX ADMIN — KETOROLAC TROMETHAMINE 15 MG: 30 INJECTION, SOLUTION INTRAMUSCULAR; INTRAVENOUS at 16:12

## 2023-10-01 RX ADMIN — ACETAMINOPHEN 325MG 650 MG: 325 TABLET ORAL at 13:16

## 2023-10-01 RX ADMIN — METOCLOPRAMIDE 10 MG: 5 INJECTION, SOLUTION INTRAMUSCULAR; INTRAVENOUS at 16:12

## 2023-10-01 NOTE — ED PROVIDER NOTES
History  Chief Complaint   Patient presents with   • Dizziness     Patient reports sudden onset of dizziness, headache, nausea after bending over at work. Near syncope. History provided by:  Patient   used: No    Dizziness  Quality:  Lightheadedness  Severity:  Moderate  Onset quality:  Gradual  Duration:  1 hour  Timing:  Sporadic  Progression:  Waxing and waning  Chronicity:  New  Context: bending over    Relieved by:  Nothing  Worsened by:  Nothing  Ineffective treatments:  None tried  Associated symptoms: headaches    Associated symptoms: no chest pain, no diarrhea, no nausea, no shortness of breath, no syncope, no vomiting and no weakness    Risk factors comment:  Hx of Migraines      Prior to Admission Medications   Prescriptions Last Dose Informant Patient Reported?  Taking?   metroNIDAZOLE (FLAGYL) 250 mg tablet Unknown  Yes No   Sig: Take by mouth every 6 (six) hours   omeprazole (PriLOSEC) 40 MG capsule Unknown Self No No   Sig: Take 1 capsule (40 mg total) by mouth daily   sertraline (ZOLOFT) 25 mg tablet Unknown  No No   Sig: Take 1 tablet (25 mg total) by mouth daily      Facility-Administered Medications: None       Past Medical History:   Diagnosis Date   • Anemia     iron def   • Chronic pain disorder     back   • Migraines    • Pneumonia 2018   • Seasonal allergies    • Seizure ([address])    • Seizures ([address])     38 years ago       Past Surgical History:   Procedure Laterality Date   • EGD  12/15/2022    done by Denisha Shay MD   • DE EXC TUMOR SOFT TISSUE THIGH/KNEE [place] 5 CM/> Right 02/03/2020    Procedure: EXCISIONAL BIOPSY SOFT  TISSUE MASS RIGHT KNEE;  Surgeon: Funmi Figueroa MD;  Location: [facility] MAIN OR;  Service: Orthopedics   • DE LAPAROSCOPY SURG CHOLECYSTECTOMY N/A 1/16/2023    Procedure: CHOLECYSTECTOMY LAP;  Surgeon: Wendy Go MD;  Location: [facility] Main OR;  Service: General   • TUBAL LIGATION         Family History   Problem Relation Age of Onset   • Heart disease Mother    • Hypertension Mother      I have reviewed and agree with the history as documented. E-Cigarette/Vaping   • E-Cigarette Use Never User      E-Cigarette/Vaping Substances   • Nicotine No    • THC No    • CBD No    • Flavoring No    • Other No    • Unknown No      Social History     Tobacco Use   • Smoking status: Every Day     Packs/day: 0.25     Types: Cigarettes     Passive exposure: Current   • Smokeless tobacco: Never   Vaping Use   • Vaping Use: Never used   Substance Use Topics   • Alcohol use: No   • Drug use: No       Review of Systems   Constitutional: Negative for chills and fever. HENT: Negative for facial swelling, sore throat and trouble swallowing. Eyes: Negative for pain and visual disturbance. Respiratory: Negative for cough and shortness of breath. Cardiovascular: Negative for chest pain, leg swelling and syncope. Gastrointestinal: Negative for abdominal pain, diarrhea, nausea and vomiting. Genitourinary: Negative for dysuria and flank pain. Musculoskeletal: Negative for back pain, neck pain and neck stiffness. Skin: Negative for pallor and rash. Allergic/Immunologic: Negative for environmental allergies and immunocompromised state. Neurological: Positive for dizziness and headaches. Negative for weakness. Hematological: Negative for adenopathy. Does not bruise/bleed easily. Psychiatric/Behavioral: Negative for agitation and behavioral problems. All other systems reviewed and are negative. Physical Exam  Physical Exam  Vitals and nursing note reviewed. Constitutional:       General: She is not in acute distress. Appearance: She is well-developed. HENT:      Head: Normocephalic and atraumatic. Eyes:      Extraocular Movements: Extraocular movements intact. Pupils: Pupils are equal, round, and reactive to light. Cardiovascular:      Rate and Rhythm: Normal rate and regular rhythm.    Pulmonary:      Effort: Pulmonary effort is normal. No respiratory distress. Abdominal:      Palpations: Abdomen is soft. Tenderness: There is no abdominal tenderness. There is no guarding or rebound. Musculoskeletal:         General: Normal range of motion. Cervical back: Normal range of motion and neck supple. Skin:     General: Skin is warm and dry. Neurological:      General: No focal deficit present. Mental Status: She is alert and oriented to person, place, and time. Cranial Nerves: No cranial nerve deficit. Motor: No weakness.       Coordination: Coordination normal.   Psychiatric:         Mood and Affect: Mood normal.         Behavior: Behavior normal.         Vital Signs  ED Triage Vitals   Temperature Pulse Respirations Blood Pressure SpO2   10/01/23 1255 10/01/23 1013 10/01/23 1255 10/01/23 1013 10/01/23 1255   97.9 °F (36.6 °C) 78 16 117/79 100 %      Temp Source Heart Rate Source Patient Position - Orthostatic VS BP Location FiO2 (%)   10/01/23 1255 10/01/23 1255 10/01/23 1013 10/01/23 1255 --   Oral Monitor Sitting - Orthostatic VS Right arm       Pain Score       10/01/23 1255       9           Vitals:    10/01/23 1315 10/01/23 1413 10/01/23 1500 10/01/23 1631   BP: 136/90 130/80 120/63 110/75   Pulse: 74 89 73 61   Patient Position - Orthostatic VS: Standing - Orthostatic VS Sitting Lying          Visual Acuity  Visual Acuity    Flowsheet Row Most Recent Value   L Pupil Size (mm) 4   R Pupil Size (mm) 4          ED Medications  Medications   sodium chloride 0.9 % bolus 1,000 mL (0 mL Intravenous Stopped 10/1/23 1502)   acetaminophen (TYLENOL) tablet 650 mg (650 mg Oral Given 10/1/23 1316)   iohexol (OMNIPAQUE) 350 MG/ML injection (MULTI-DOSE) 85 mL (85 mL Intravenous Given 10/1/23 1440)   diphenhydrAMINE (BENADRYL) injection 25 mg (25 mg Intravenous Given 10/1/23 1612)   metoclopramide (REGLAN) injection 10 mg (10 mg Intravenous Given 10/1/23 1612)   ketorolac (TORADOL) injection 15 mg (15 mg Intravenous Given 10/1/23 1612)       Diagnostic Studies  Results Reviewed     Procedure Component Value Units Date/Time    Urine Microscopic [[id number]]  (Abnormal) Collected: 10/01/23 1410    Lab Status: Final result Specimen: Urine, Clean Catch Updated: 10/01/23 1429     RBC, UA 10-20 /hpf      WBC, UA 1-2 /hpf      Epithelial Cells Occasional /hpf      Bacteria, UA None Seen /hpf      MUCUS THREADS Moderate    POCT pregnancy, urine [[id number]]  (Normal) Resulted: 10/01/23 1413    Lab Status: Final result Updated: 10/01/23 1413     EXT Preg Test, Ur Negative     Control Valid    Urine Macroscopic, POC [[id number]]  (Abnormal) Collected: 10/01/23 1410    Lab Status: Final result Specimen: Urine Updated: 10/01/23 1411     Color, UA Yellow     Clarity, UA Clear     pH, UA 5.5     Leukocytes, UA Negative     Nitrite, UA Negative     Protein, UA Negative mg/dl      Glucose, UA Negative mg/dl      Ketones, UA Negative mg/dl      Urobilinogen, UA 1.0 E.U./dl      Bilirubin, UA Negative     Occult Blood, UA Moderate     Specific Gravity, UA >=1.030    Narrative:      CLINITEK RESULT    HS Troponin 0hr (reflex protocol) [[id number]]  (Normal) Collected: 10/01/23 1303    Lab Status: Final result Specimen: Blood from Arm, Left Updated: 10/01/23 1349     hs TnI 0hr <2 ng/L     Comprehensive metabolic panel [[id number]] Collected: 10/01/23 1303    Lab Status: Final result Specimen: Blood from Arm, Left Updated: 10/01/23 1340     Sodium 137 mmol/L      Potassium 3.6 mmol/L      Chloride 107 mmol/L      CO2 26 mmol/L      ANION GAP 4 mmol/L      BUN 10 mg/dL      Creatinine 0.79 mg/dL      Glucose 102 mg/dL      Calcium 8.9 mg/dL      AST 15 U/L      ALT 8 U/L      Alkaline Phosphatase 62 U/L      Total Protein 7.2 g/dL      Albumin 3.9 g/dL      Total Bilirubin 0.38 mg/dL      eGFR 91 ml/min/1.73sq m     Narrative:      [facility] guidelines for Chronic Kidney Disease (CKD):   •  Stage 1 with normal or high GFR (GFR > 90 mL/min/1.73 square meters)  •  Stage 2 Mild CKD (GFR = 60-89 mL/min/1.73 square meters)  •  Stage 3A Moderate CKD (GFR = 45-59 mL/min/1.73 square meters)  •  Stage 3B Moderate CKD (GFR = 30-44 mL/min/1.73 square meters)  •  Stage 4 Severe CKD (GFR = 15-29 mL/min/1.73 square meters)  •  Stage 5 End Stage CKD (GFR <15 mL/min/1.73 square meters)  Note: GFR calculation is accurate only with a steady state creatinine    POCT pregnancy, urine [[id number]]  (Normal) Resulted: 10/01/23 1338    Lab Status: Edited Result - FINAL Updated: 10/01/23 1338    CBC and differential [[id number]]  (Abnormal) Collected: 10/01/23 1303    Lab Status: Final result Specimen: Blood from Arm, Left Updated: 10/01/23 1324     WBC 5.63 Thousand/uL      RBC 4.76 Million/uL      Hemoglobin 11.8 g/dL      Hematocrit 39.8 %      MCV 84 fL      MCH 24.8 pg      MCHC 29.6 g/dL      RDW 14.5 %      MPV 10.6 fL      Platelets 816 Thousands/uL      nRBC 0 /100 WBCs      Neutrophils Relative 50 %      Immat GRANS % 0 %      Lymphocytes Relative 40 %      Monocytes Relative 5 %      Eosinophils Relative 4 %      Basophils Relative 1 %      Neutrophils Absolute 2.81 Thousands/µL      Immature Grans Absolute 0.01 Thousand/uL      Lymphocytes Absolute 2.26 Thousands/µL      Monocytes Absolute 0.27 Thousand/µL      Eosinophils Absolute 0.25 Thousand/µL      Basophils Absolute 0.03 Thousands/µL     Fingerstick Glucose (POCT) [[id number]]  (Normal) Collected: 10/01/23 1305    Lab Status: Final result Updated: 10/01/23 1310     POC Glucose 116 mg/dl                  CTA head and neck with and without contrast   Final Result by Sherlyn Powell MD (10/01 1657)      No evidence of acute intracranial hemorrhage. No evidence of hemodynamic significant stenosis, aneurysm or dissection.                   Workstation performed: [id number]                    Procedures  ECG 12 Lead Documentation Only    Date/Time: 10/1/2023 3:25 PM    Performed by: Suzie Baum MD  Authorized by: Patricia Hoang MD    Indications / Diagnosis:  DIzziness  ECG reviewed by me, the ED Provider: yes    Patient location:  ED  Interpretation:     Interpretation: normal    Rate:     ECG rate assessment: normal    Rhythm:     Rhythm: sinus rhythm    Ectopy:     Ectopy: none    QRS:     QRS axis:  Normal  Conduction:     Conduction: normal    ST segments:     ST segments:  Normal  T waves:     T waves: normal               ED Course  ED Course as of 10/01/23 2327   Sun Oct 01, 2023   1308 Patient evaluated at bedside, NIH 0, no criteria for Stroke Alert, we will get CTA head and neck due to HA and acute onset of dizziness. 1348 WBC: 5.63   1348 Hemoglobin: 11.8   1348 Platelet Count: 641   1348 Sodium: 137   1348 Potassium: 3.6   1348 BUN: 10   1348 Creatinine: 0.79  CBC, CMP non-acute. 1514 hs TnI 0hr: <2  Troponin negative. 1653 Patient wants to leave prior to CTA results, conscious, alert, stable, no acute distress, steady, patient states that she will see the results in MyChart, understands that there is possibility of potential significant abnormal findings on CT in which case she said she will return to the ER.              HEART Risk Score    Flowsheet Row Most Recent Value   Heart Score Risk Calculator    History 0 Filed at: 10/01/2023 1520   ECG 0 Filed at: 10/01/2023 1520   Age 0 Filed at: 10/01/2023 1520   Risk Factors 0 Filed at: 10/01/2023 1520   Troponin 0 Filed at: 10/01/2023 1520   HEART Score 0 Filed at: 10/01/2023 1520           Stroke Assessment     Row Name 10/01/23 1432             NIH Stroke Scale    Interval Baseline      Level of Consciousness (1a.) 0      LOC Questions (1b.) 0      LOC Commands (1c.) 0      Best Gaze (2.) 0      Visual (3.) 0      Facial Palsy (4.) 0      Motor Arm, Left (5a.) 0      Motor Arm, Right (5b.) 0      Motor Leg, Left (6a.) 0      Motor Leg, Right (6b.) 0      Limb Ataxia (7.) 0      Sensory (8.) 0      Best Language (9.) 0      Dysarthria (10.) 0 Extinction and Inattention (11.) (Formerly Neglect) 0      Total 0                            SBIRT 22yo+    Flowsheet Row Most Recent Value   Initial Alcohol Screen: US AUDIT-C     1. How often do you have a drink containing alcohol? 0 Filed at: 10/01/2023 1305   2. How many drinks containing alcohol do you have on a typical day you are drinking? 0 Filed at: 10/01/2023 1305   3a. Male UNDER 65: How often do you have five or more drinks on one occasion? 0 Filed at: 10/01/2023 1305   3b. FEMALE Any Age, or MALE 65+: How often do you have 4 or more drinks on one occassion? 0 Filed at: 10/01/2023 1305   Audit-C Score 0 Filed at: 10/01/2023 1305   [place]: How many times in the past year have you. .. Used an illegal drug or used a prescription medication for non-medical reasons? Never Filed at: 10/01/2023 1305                    Medical Decision Making  Patient is a 38 yo female, hx of headaches/migraines, comes with episode of sudden dizziness followed by headache when patient bent down and got up again, happened at work, patient brought straight from there, HA described as generalized, dull not sharp, no fever, cough, chest pain, dyspnea, abd pain, vomiting, diarrhea. On exam, conscious, alert, VSS, PERRL, no nystagmus, CN intact, no focal neuro deficits, normal coordination, motor/sensory exam intact bilateral upper and lower extremities. D/D: Migraine, Dizziness, r/o anemia, electrolyte imbalance, doubt stroke as normal neuro exam, NIH 0, but due to acute onset of dizziness we will r/o post insufficiency with CTA, give migraine meds. Dizziness: acute illness or injury  Headache: acute illness or injury  Amount and/or Complexity of Data Reviewed  Labs: ordered. Decision-making details documented in ED Course. Radiology: ordered. Decision-making details documented in ED Course. ECG/medicine tests: ordered and independent interpretation performed. Decision-making details documented in ED Course.       Risk  OTC drugs.  Prescription drug management. Disposition  Final diagnoses:   Dizziness   Headache     Time reflects when diagnosis was documented in both MDM as applicable and the Disposition within this note     Time User Action Codes Description Comment    10/1/2023  4:54 PM Dillon Estrada Add [R42] Dizziness     10/1/2023 11:14 PM Rui Kirk [R51.9] Headache       ED Disposition     ED Disposition   Discharge    Condition   Stable    Date/Time   Sun Oct 1, 2023  4:54 PM    Comment   Theguerrero Harper discharge to home/self care. Follow-up Information     Follow up With Specialties Details Why Contact Info Additional [address], [place] Family Medicine   [address] [phone]       [facility] Emergency Department Emergency Medicine  If symptoms worsen, As needed [address] [id number]  1302 [facility] Emergency Department, [address] [place] [address], [id number]          Discharge Medication List as of 10/1/2023  4:54 PM      CONTINUE these medications which have NOT CHANGED    Details   metroNIDAZOLE (FLAGYL) 250 mg tablet Take by mouth every 6 (six) hours, Historical Med      omeprazole (PriLOSEC) 40 MG capsule Take 1 capsule (40 mg total) by mouth daily, Starting Tue 11/29/2022, Normal      sertraline (ZOLOFT) 25 mg tablet Take 1 tablet (25 mg total) by mouth daily, Starting Mon 5/22/2023, Until Sat 11/18/2023, Normal             No discharge procedures on file.     PDMP Review       Value Time User    PDMP Reviewed  Yes 2/4/2020 10:31 AM Yasmine Aceves MD          ED Provider  Electronically Signed by           Dillon Estrada MD  10/01/23 6812

## 2023-10-01 NOTE — ED NOTES
Pt requesting to leave without CT scan results. Pt states " This is taking forever I want to go home. You are starving me." Provider made aware at this time.        Larisa Boucher RN  10/01/23 0767

## 2023-10-01 NOTE — ED NOTES
Upon discharge, This RN went to remove pt's IV when pt showed RN her arm and stated "I took it out because you guys are taking too long." Pt educated not to remove IV on her own and that a trained staff member needs to remove it next time.       Zechariah Salter RN  10/01/23 [address], RN  10/01/23 1703

## 2023-10-02 ENCOUNTER — TELEPHONE (OUTPATIENT)
Dept: OBGYN CLINIC | Facility: CLINIC | Age: 43
End: 2023-10-02

## 2023-10-20 ENCOUNTER — TELEPHONE (OUTPATIENT)
Dept: FAMILY MEDICINE CLINIC | Facility: CLINIC | Age: 43
End: 2023-10-20

## 2023-11-05 NOTE — ANESTHESIA POSTPROCEDURE EVALUATION
Patient has Abnormal Magnesium: hypomagnesemia. Will continue to monitor electrolytes closely. Patient did receive Magnesium 2g IV while in the ED. Will replace the affected electrolytes and repeat labs to be done after interventions completed. The patient's magnesium results have been reviewed and are listed below.  Recent Labs   Lab 11/05/23  1017   MG 1.1*       Post-Op Assessment Note    CV Status:  Stable  Pain Score: 2    Pain management: adequate     Mental Status:  Alert and awake   Hydration Status:  Euvolemic   PONV Controlled:  Controlled   Airway Patency:  Patent   Post Op Vitals Reviewed: Yes      Staff: Anesthesiologist, CRNA           /73 (02/03/20 1516)    Temp 97 8 °F (36 6 °C) (02/03/20 1516)    Pulse 67 (02/03/20 1516)   Resp 16 (02/03/20 1516)    SpO2 100 % (02/03/20 1516)

## 2023-12-18 ENCOUNTER — HOSPITAL ENCOUNTER (EMERGENCY)
Facility: HOSPITAL | Age: 43
Discharge: HOME | End: 2023-12-18
Attending: EMERGENCY MEDICINE | Admitting: EMERGENCY MEDICINE
Payer: MEDICARE

## 2023-12-18 VITALS
OXYGEN SATURATION: 98 % | TEMPERATURE: 98.1 F | RESPIRATION RATE: 18 BRPM | DIASTOLIC BLOOD PRESSURE: 65 MMHG | HEART RATE: 98 BPM | SYSTOLIC BLOOD PRESSURE: 116 MMHG

## 2023-12-18 DIAGNOSIS — Z20.822 PERSON UNDER INVESTIGATION FOR COVID-19: Primary | ICD-10-CM

## 2023-12-18 PROCEDURE — 87636 SARSCOV2 & INF A&B AMP PRB: CPT | Performed by: PHYSICIAN ASSISTANT

## 2023-12-18 PROCEDURE — 99284 EMERGENCY DEPT VISIT MOD MDM: CPT

## 2023-12-18 PROCEDURE — 99284 EMERGENCY DEPT VISIT MOD MDM: CPT | Performed by: PHYSICIAN ASSISTANT

## 2023-12-18 NOTE — Clinical Note
Kira Barrow was seen and treated in our emergency department on 12/18/2023.                Diagnosis:     Kira  .    She may return on this date:     PENDING COVID19 TEST RESULT.          If you have any questions or concerns, please don't hesitate to call.      Rosanne Sutton PA-C    ______________________________           _______________          _______________  Hospital Representative                              Date                                Time

## 2023-12-18 NOTE — ED PROVIDER NOTES
History  Chief Complaint   Patient presents with    COVID-19 Exposure     Pt here to be covid test after being exposed by step children. Pt c/o cough     43y.o female with PMH of anemia, chronic back pain, migraines and seizure presents to the ER for covid testing. Patient states she brought her step children in yesterday and they tested positive for covid. Patient reports beginning to have a cough yesterday. Symptoms have been constant. She denies other complaints.       History provided by:  Patient   used: No        Prior to Admission Medications   Prescriptions Last Dose Informant Patient Reported? Taking?   metroNIDAZOLE (FLAGYL) 250 mg tablet   Yes No   Sig: Take by mouth every 6 (six) hours   omeprazole (PriLOSEC) 40 MG capsule  Self No No   Sig: Take 1 capsule (40 mg total) by mouth daily   sertraline (ZOLOFT) 25 mg tablet   No No   Sig: Take 1 tablet (25 mg total) by mouth daily      Facility-Administered Medications: None       Past Medical History:   Diagnosis Date    Anemia     iron def    Chronic pain disorder     back    Migraines     Pneumonia 2018    Seasonal allergies     Seizure (HCC)     Seizures (HCC)     38 years ago       Past Surgical History:   Procedure Laterality Date    EGD  12/15/2022    done by Dima White MD    CT EXC TUMOR SOFT TISSUE THIGH/KNEE SUBFASC 5 CM/> Right 02/03/2020    Procedure: EXCISIONAL BIOPSY SOFT  TISSUE MASS RIGHT KNEE;  Surgeon: Sebastian Babin MD;  Location:  MAIN OR;  Service: Orthopedics    CT LAPAROSCOPY SURG CHOLECYSTECTOMY N/A 1/16/2023    Procedure: CHOLECYSTECTOMY LAP;  Surgeon: Octavio Martinez MD;  Location: [facility];  Service: General    TUBAL LIGATION         Family History   Problem Relation Age of Onset    Heart disease Mother     Hypertension Mother      I have reviewed and agree with the history as documented.    E-Cigarette/Vaping    E-Cigarette Use Never User      E-Cigarette/Vaping Substances    Nicotine No     THC No      CBD No     Flavoring No     Other No     Unknown No      Social History     Tobacco Use    Smoking status: Every Day     Current packs/day: 0.25     Types: Cigarettes     Passive exposure: Current    Smokeless tobacco: Never   Vaping Use    Vaping status: Never Used   Substance Use Topics    Alcohol use: No    Drug use: No       Review of Systems   Respiratory:  Positive for cough.    All other systems reviewed and are negative.      Physical Exam  Physical Exam  Vitals and nursing note reviewed.   Constitutional:       General: She is not in acute distress.     Appearance: She is not toxic-appearing.   HENT:      Head: Normocephalic and atraumatic.   Eyes:      Conjunctiva/sclera: Conjunctivae normal.   Neck:      Trachea: No tracheal deviation.   Cardiovascular:      Rate and Rhythm: Normal rate.   Pulmonary:      Effort: Pulmonary effort is normal. No respiratory distress.   Abdominal:      General: There is no distension.   Musculoskeletal:      Cervical back: Normal range of motion and neck supple.   Skin:     General: Skin is warm and dry.      Findings: No rash.   Neurological:      Mental Status: She is alert.      GCS: GCS eye subscore is 4. GCS verbal subscore is 5. GCS motor subscore is 6.   Psychiatric:         Mood and Affect: Mood normal.         Vital Signs  ED Triage Vitals [12/18/23 1002]   Temperature Pulse Respirations Blood Pressure SpO2   98.1 °F (36.7 °C) 98 18 116/65 98 %      Temp src Heart Rate Source Patient Position - Orthostatic VS BP Location FiO2 (%)   -- -- Sitting Right arm --      Pain Score       --           Vitals:    12/18/23 1002   BP: 116/65   Pulse: 98   Patient Position - Orthostatic VS: Sitting         Visual Acuity      ED Medications  Medications - No data to display    Diagnostic Studies  Results Reviewed       Procedure Component Value Units Date/Time    FLU/COVID - if FLU clinically relevant [[id number]] Collected: 12/18/23 1019    Lab Status: In process Specimen:  Andrew from Nose Updated: 12/18/23 1023                   No orders to display              Procedures  Procedures         ED Course                               SBIRT 20yo+      Flowsheet Row Most Recent Value   Initial Alcohol Screen: US AUDIT-C     1. How often do you have a drink containing alcohol? 0 Filed at: 12/18/2023 1004   2. How many drinks containing alcohol do you have on a typical day you are drinking?  0 Filed at: 12/18/2023 1004   3b. FEMALE Any Age, or MALE 65+: How often do you have 4 or more drinks on one occassion? 0 Filed at: 12/18/2023 1004   Audit-C Score 0 Filed at: 12/18/2023 1004   [place]: How many times in the past year have you...    Used an illegal drug or used a prescription medication for non-medical reasons? Never Filed at: 12/18/2023 1004                      Medical Decision Making  43y.o female presents to the ER for a covid test. Vitals are stable. Patient is in no acute distress. On exam, breathing is non-labored. No tachypnea or accessory muscle use. Heart is regular rate. Abdomen is not distended. Will check a covid/flu test per patient's request.    The management plan was discussed in detail with the patient at bedside and all questions were answered.  Prior to discharge, we provided both verbal and written instructions.  We discussed with the patient the signs and symptoms for which to return to the emergency department.  All questions were answered and patient was comfortable with the plan of care and discharged to home.  Instructed the patient to follow up with the primary care provider and/or specialist provided and their written instructions.  The patient verbalized understanding of our discussion and plan of care, and agrees to return to the Emergency Department for concerns and progression of illness.    At discharge, I instructed the patient to:  -follow up with pcp  -take Tylenol or Motrin for pain or fever  -take OTC medication for other symptoms  -rest and drink  plenty of fluids  -return to the ER if symptoms worsened or new symptoms arose  Patient agreed to this plan and was stable at time of discharge.     Problems Addressed:  Person under investigation for COVID-19: acute illness or injury    Amount and/or Complexity of Data Reviewed  Independent Historian:      Details: Patient is historian  Labs: ordered.             Disposition  Final diagnoses:   Person under investigation for COVID-19     Time reflects when diagnosis was documented in both MDM as applicable and the Disposition within this note       Time User Action Codes Description Comment    12/18/2023 10:11 AM Rosanne Sutton Add [Z20.822] Person under investigation for COVID-19           ED Disposition       ED Disposition   Discharge    Condition   Stable    Date/Time   Mon Dec 18, 2023 1011    Comment   Kira Barrow discharge to home/self care.                   Follow-up Information       Follow up With Specialties Details Why Contact Info    Octavio Luna MD Internal Medicine Schedule an appointment as soon as possible for a visit   [address] [place] [id number]  [phone]              Discharge Medication List as of 12/18/2023 10:12 AM        CONTINUE these medications which have NOT CHANGED    Details   metroNIDAZOLE (FLAGYL) 250 mg tablet Take by mouth every 6 (six) hours, Historical Med      omeprazole (PriLOSEC) 40 MG capsule Take 1 capsule (40 mg total) by mouth daily, Starting Tue 11/29/2022, Normal      sertraline (ZOLOFT) 25 mg tablet Take 1 tablet (25 mg total) by mouth daily, Starting Mon 5/22/2023, Until Sat 11/18/2023, Normal             No discharge procedures on file.    PDMP Review         Value Time User    PDMP Reviewed  Yes 2/4/2020 10:31 AM Sebastian Babin MD            ED Provider  Electronically Signed by             Rosanne Sutton PA-C  12/18/23 0732

## 2023-12-18 NOTE — DISCHARGE INSTRUCTIONS
DISCHARGE INSTRUCTIONS:    FOLLOW UP WITH YOUR PRIMARY CARE PROVIDER OR THE [facility]. MAKE AN APPOINTMENT TO BE SEEN.     TAKE TYLENOL OR MOTRIN FOR PAIN OR FEVER.    TAKE OVER-THE-COUNTER MEDICATION IF YOU BECOME SYMPTOMATIC.    REST AND DRINK PLENTY OF FLUIDS.    IF SYMPTOMS WORSEN OR NEW SYMPTOMS ARISE, RETURN TO THE ER TO BE SEEN.

## 2023-12-19 LAB
FLUAV RNA RESP QL NAA+PROBE: NEGATIVE
FLUBV RNA RESP QL NAA+PROBE: NEGATIVE
SARS-COV-2 RNA RESP QL NAA+PROBE: NEGATIVE

## 2024-04-24 ENCOUNTER — OFFICE VISIT (OUTPATIENT)
Dept: FAMILY MEDICINE CLINIC | Facility: CLINIC | Age: 44
End: 2024-04-24

## 2024-04-24 ENCOUNTER — APPOINTMENT (OUTPATIENT)
Dept: LAB | Facility: CLINIC | Age: 44
End: 2024-04-24
Payer: MEDICARE

## 2024-04-24 VITALS
BODY MASS INDEX: 22.65 KG/M2 | TEMPERATURE: 98 F | HEART RATE: 76 BPM | SYSTOLIC BLOOD PRESSURE: 124 MMHG | WEIGHT: 162.4 LBS | DIASTOLIC BLOOD PRESSURE: 78 MMHG | OXYGEN SATURATION: 99 %

## 2024-04-24 DIAGNOSIS — R56.9 SEIZURES (HCC): ICD-10-CM

## 2024-04-24 DIAGNOSIS — M25.512 CHRONIC LEFT SHOULDER PAIN: ICD-10-CM

## 2024-04-24 DIAGNOSIS — M54.50 CHRONIC MIDLINE LOW BACK PAIN WITHOUT SCIATICA: ICD-10-CM

## 2024-04-24 DIAGNOSIS — M54.42 CHRONIC BILATERAL LOW BACK PAIN WITH BILATERAL SCIATICA: ICD-10-CM

## 2024-04-24 DIAGNOSIS — G89.29 CHRONIC MIDLINE LOW BACK PAIN WITHOUT SCIATICA: ICD-10-CM

## 2024-04-24 DIAGNOSIS — Z12.31 BREAST CANCER SCREENING BY MAMMOGRAM: ICD-10-CM

## 2024-04-24 DIAGNOSIS — Z23 ENCOUNTER FOR IMMUNIZATION: Primary | ICD-10-CM

## 2024-04-24 DIAGNOSIS — M54.41 CHRONIC BILATERAL LOW BACK PAIN WITH BILATERAL SCIATICA: ICD-10-CM

## 2024-04-24 DIAGNOSIS — G89.29 CHRONIC BILATERAL LOW BACK PAIN WITH BILATERAL SCIATICA: ICD-10-CM

## 2024-04-24 DIAGNOSIS — Z00.00 ANNUAL PHYSICAL EXAM: ICD-10-CM

## 2024-04-24 DIAGNOSIS — D50.0 IRON DEFICIENCY ANEMIA DUE TO CHRONIC BLOOD LOSS: ICD-10-CM

## 2024-04-24 DIAGNOSIS — N92.0 MENORRHAGIA WITH REGULAR CYCLE: ICD-10-CM

## 2024-04-24 DIAGNOSIS — Z72.0 TOBACCO USE: ICD-10-CM

## 2024-04-24 DIAGNOSIS — G89.29 CHRONIC LEFT SHOULDER PAIN: ICD-10-CM

## 2024-04-24 LAB
BASOPHILS # BLD AUTO: 0.02 THOUSANDS/ÂΜL (ref 0–0.1)
BASOPHILS NFR BLD AUTO: 1 % (ref 0–1)
EOSINOPHIL # BLD AUTO: 0.17 THOUSAND/ÂΜL (ref 0–0.61)
EOSINOPHIL NFR BLD AUTO: 4 % (ref 0–6)
ERYTHROCYTE [DISTWIDTH] IN BLOOD BY AUTOMATED COUNT: 14.2 % (ref 11.6–15.1)
FERRITIN SERPL-MCNC: 11 NG/ML (ref 11–307)
HCT VFR BLD AUTO: 44.2 % (ref 34.8–46.1)
HGB BLD-MCNC: 13.4 G/DL (ref 11.5–15.4)
IMM GRANULOCYTES # BLD AUTO: 0.01 THOUSAND/UL (ref 0–0.2)
IMM GRANULOCYTES NFR BLD AUTO: 0 % (ref 0–2)
IRON SATN MFR SERPL: 18 % (ref 15–50)
IRON SERPL-MCNC: 77 UG/DL (ref 50–212)
LYMPHOCYTES # BLD AUTO: 1.55 THOUSANDS/ÂΜL (ref 0.6–4.47)
LYMPHOCYTES NFR BLD AUTO: 37 % (ref 14–44)
MCH RBC QN AUTO: 24.9 PG (ref 26.8–34.3)
MCHC RBC AUTO-ENTMCNC: 30.3 G/DL (ref 31.4–37.4)
MCV RBC AUTO: 82 FL (ref 82–98)
MONOCYTES # BLD AUTO: 0.53 THOUSAND/ÂΜL (ref 0.17–1.22)
MONOCYTES NFR BLD AUTO: 13 % (ref 4–12)
NEUTROPHILS # BLD AUTO: 1.86 THOUSANDS/ÂΜL (ref 1.85–7.62)
NEUTS SEG NFR BLD AUTO: 45 % (ref 43–75)
NRBC BLD AUTO-RTO: 0 /100 WBCS
PLATELET # BLD AUTO: 227 THOUSANDS/UL (ref 149–390)
PMV BLD AUTO: 11.8 FL (ref 8.9–12.7)
RBC # BLD AUTO: 5.39 MILLION/UL (ref 3.81–5.12)
SL AMB POCT URINE HCG: NEGATIVE
TIBC SERPL-MCNC: 419 UG/DL (ref 250–450)
TSH SERPL DL<=0.05 MIU/L-ACNC: 1.59 UIU/ML (ref 0.45–4.5)
UIBC SERPL-MCNC: 342 UG/DL (ref 155–355)
WBC # BLD AUTO: 4.14 THOUSAND/UL (ref 4.31–10.16)

## 2024-04-24 PROCEDURE — 99214 OFFICE O/P EST MOD 30 MIN: CPT | Performed by: NURSE PRACTITIONER

## 2024-04-24 PROCEDURE — 83550 IRON BINDING TEST: CPT

## 2024-04-24 PROCEDURE — 82728 ASSAY OF FERRITIN: CPT

## 2024-04-24 PROCEDURE — 83540 ASSAY OF IRON: CPT

## 2024-04-24 PROCEDURE — 84443 ASSAY THYROID STIM HORMONE: CPT

## 2024-04-24 PROCEDURE — 85025 COMPLETE CBC W/AUTO DIFF WBC: CPT

## 2024-04-24 PROCEDURE — 99396 PREV VISIT EST AGE 40-64: CPT | Performed by: NURSE PRACTITIONER

## 2024-04-24 PROCEDURE — 36415 COLL VENOUS BLD VENIPUNCTURE: CPT

## 2024-04-24 PROCEDURE — 81025 URINE PREGNANCY TEST: CPT | Performed by: NURSE PRACTITIONER

## 2024-04-24 RX ORDER — ACETAMINOPHEN AND CODEINE PHOSPHATE 120; 12 MG/5ML; MG/5ML
1 SOLUTION ORAL DAILY
Qty: 90 TABLET | Refills: 3 | Status: SHIPPED | OUTPATIENT
Start: 2024-04-24

## 2024-04-24 NOTE — PATIENT INSTRUCTIONS
Norethindrone (By mouth)   Norethindrone (nor-ETH-in-drone)  Prevents pregnancy. Also treats menstrual problems and endometriosis. This medicine is commonly called a birth control pill.   Brand Name(s): Aygestin, Danae, Deblitane, Rsoa, Carrie, Incassia, Jencycla, Lyleq, Lyza, Nandini-BE, Norlyda, Norlyroc, Ortho Micronor, Sharobel, [place]   There may be other brand names for this medicine.  When This Medicine Should Not Be Used:   This medicine is not right for everyone. Do not use it if you had an allergic reaction to norethindrone or similar medicines, or if you are pregnant. Do not use it if you have liver disease, liver tumors, unusual vaginal bleeding, or a history of blood clots, stroke, heart attack, or breast cancer.  How to Use This Medicine:   Tablet  Your doctor will tell you how much medicine to use. Do not use more than directed. Different brands of birth control pills have different instructions for when to start. Ask your doctor or pharmacist if you do not understand what day to start taking your brand.  You may take this medicine with food or milk if it upsets your stomach.  Take your pill at the same time every day. Birth control pills work best when there is no more than 24 hours between doses.  Read and follow the patient instructions that come with this medicine. Talk to your doctor or pharmacist if you have any questions.  Missed dose:   This medicine has specific patient instructions on what to do if you miss a dose. Read and follow these instructions carefully, and call your doctor if you have any questions.  If you miss a dose, and it is more than 3 hours from your scheduled dose, take the pill as soon as you can, then take your next pill at the next regular time. Use a second form of birth control (including condom, spermicide) for the next 48 hours.  If you vomit after taking the pills, use another form of birth control for the next 48 hours.  Do not use extra medicine to make up for a  missed dose.  Store the medicine in a closed container at room temperature, away from heat, moisture, and direct light.  Drugs and Foods to Avoid:   Ask your doctor or pharmacist before using any other medicine, including over-the-counter medicines, vitamins, and herbal products.  Some foods and medicines can affect how norethindrone birth control pills work. Tell your doctor if you are also taking any of the following:  Aprepitant, bosentan, carbamazepine, cyclosporine, felbamate, fluconazole, griseofulvin, itraconazole, ketoconazole, oxcarbazepine, phenytoin, rifabutin, rifampicin, rifampin, rufinamide, Mehul's wort, voriconazole  Barbiturates  Medicine to treat HIV or AIDS (including atazanavir/ritonavir, boceprevir, darunavir/ritonavir, efavirenz, etravirine, fosamprenavir/ritonavir, indinavir, lopinavir/ritonavir, nelfinavir, nevirapine, ritonavir, telaprevir, tipranavir/ritonavir)  Do not eat grapefruit or drink grapefruit juice while you are using this medicine.  Take this medicine at least 5 days after using ulipristal acetate. Use another form of birth control (including condom, spermicide) until your next menstrual period.  Warnings While Using This Medicine:   Tell your doctor right away if you think you have become pregnant. If you miss two periods in a row, call your doctor for a pregnancy test before you take any more pills.  Tell your doctor if you are breastfeeding or if you have kidney disease, lupus, high blood pressure, high cholesterol, seizures, asthma, migraine headaches, diabetes, or a history of depression. Tell your doctor if you smoke.  This medicine may cause the following problems:  Increased risk of ectopic pregnancy (pregnancy outside the uterus)  Cysts in the ovaries  Irregular menstrual bleeding  Increased risk of breast cancer  Liver problems, including liver cancer or tumor  Increased risk of blood clots, heart attack, or stroke  This medicine will not protect you from HIV/AIDS or  other sexually transmitted diseases.  Use a second form of birth control during the first 3 weeks to make sure you are protected from pregnancy.  Tell any doctor or dentist who treats you that you are using this medicine. This medicine may affect certain medical test results.  Your doctor will check your progress and the effects of this medicine at regular visits. Keep all appointments.  Keep all medicine out of the reach of children. Never share your medicine with anyone.  Possible Side Effects While Using This Medicine:   Call your doctor right away if you notice any of these side effects:  Allergic reaction: Itching or hives, swelling in your face or hands, swelling or tingling in your mouth or throat, chest tightness, trouble breathing  Breast tenderness or swelling  Chest pain, trouble breathing, coughing up blood  Dark urine or pale stools, loss of appetite, nausea, vomiting, stomach pain, yellow skin or eyes  Irregular, late, or missed menstrual period  Numbness or weakness on one side of your body, sudden or severe headache, problems with vision, speech, or walking  Severe headache, sensitivity to light or sound  Trouble seeing, double vision, or other eye problems  Unusual vaginal bleeding, spotting, discharge, or itching  If you notice these less serious side effects, talk with your doctor:   Headache, dizziness  Vaginal spotting or light bleeding, itching, discharge  If you notice other side effects that you think are caused by this medicine, tell your doctor.   Call your doctor for medical advice about side effects. You may report side effects to FDA at 1-286-FDA-1000  © Copyright [employer]ative 2023 Information is for End User's use only and may not be sold, redistributed or otherwise used for commercial purposes.  The above information is an  only. It is not intended as medical advice for individual conditions or treatments. Talk to your doctor, nurse or pharmacist before following any medical  regimen to see if it is safe and effective for you.

## 2024-04-24 NOTE — PROGRESS NOTES
ADULT ANNUAL PHYSICAL  [facility]    NAME: Kira Barrow  AGE: 44 y.o. SEX: female  : 1980     DATE: 2024     Assessment and Plan:     Problem List Items Addressed This Visit          Behavioral Health    Tobacco use     -continue to encourage cessation  -patient is aware of pharmacotherapy aids to assist with cessation               Blood    Iron deficiency anemia due to chronic blood loss     Lab Results   Component Value Date    WBC 5.63 10/01/2023    HGB 11.8 10/01/2023    HCT 39.8 10/01/2023    MCV 84 10/01/2023     10/01/2023     Lab Results   Component Value Date    IRON 57 2023    TIBC 344 2023    FERRITIN 15 2023     Patient reports hx of MOISES 2/2 menorrhagia, will order labs  She has not been taking iron due to constipation   She was showed a gentler form of oral iron today and agrees to start taking             Surgery/Wound/Pain    Chronic midline low back pain without sciatica     Chronic issue with bilateral leg radiation , Denies numbness/ tingling, incontinence, falls, weakness, fever, saddle anesthesia.   Recommend lumbar x-ray, PT  Can use OTC NSAIDs, acetaminophen            Relevant Orders    XR spine lumbar minimum 4 views non injury    Ambulatory Referral to Physical Therapy       Obstetrics/Gynecology    Menorrhagia with regular cycle     Regular cycle, 5 days of heavy bleeding, goes through pads every hour, by 5th day feels weak  Will trial norethindrone to decrease flow and check pelvic U/S until she is seen by GYN for any additional recommendations   ED parameters reviewed          Relevant Medications    norethindrone (Ortho Micronor) 0.35 MG tablet    Other Relevant Orders    Iron Panel (Includes Ferritin, Iron Sat%, Iron, and TIBC) (Completed)    TSH, 3rd generation with Free T4 reflex (Completed)    CBC and differential (Completed)    US abdomen and pelvis with transvaginal     POCT urine HCG (Completed)       Neurology/Sleep    Seizures (HCC)     As a child, no seizure activity as an adult           Other Visit Diagnoses       Encounter for immunization    -  Primary    Chronic bilateral low back pain with bilateral sciatica        Relevant Orders    XR spine lumbar minimum 4 views non injury    Ambulatory Referral to Physical Therapy    Breast cancer screening by mammogram        Relevant Orders    Mammo screening bilateral w 3d & cad    Chronic left shoulder pain        Relevant Orders    Ambulatory Referral to Physical Therapy    Annual physical exam                Immunizations and preventive care screenings were discussed with patient today. Appropriate education was printed on patient's after visit summary.    Counseling:  Alcohol/drug use: discussed moderation in alcohol intake, the recommendations for healthy alcohol use, and avoidance of illicit drug use.  Dental Health: discussed importance of regular tooth brushing, flossing, and dental visits.  Injury prevention: discussed safety/seat belts, safety helmets, smoke detectors, carbon dioxide detectors, and smoking near bedding or upholstery.  Sexual health: discussed sexually transmitted diseases, partner selection, use of condoms, avoidance of unintended pregnancy, and contraceptive alternatives.  Exercise: the importance of regular exercise/physical activity was discussed. Recommend exercise 3-5 times per week for at least 30 minutes.       Tobacco Cessation Counseling: Tobacco cessation counseling was provided. The patient is sincerely urged to quit consumption of tobacco. She is ready to quit tobacco.         Return in about 8 weeks (around 6/19/2024) for back pain, shoulder pain, anemia .     Chief Complaint:     Chief Complaint   Patient presents with    Physical Exam      History of Present Illness:     Adult Annual Physical   Patient is a 45 YO female with PMH  has a past medical history of Anemia, Arthritis, Chronic  pain disorder, Migraines, Pneumonia (2018), Seasonal allergies, Seizure (HCC), and Seizures (HCC). here for a comprehensive physical exam. The patient reports heavy menses that occurs monthly. She states for 5 days she goes through a pad every 1 hours. By the 5th day she feels tired and weak. No known hx of fibroids or autoimmune d/o. She has not been taking iron due to constipation even with taking it every other day. She also reports chronic low back pain and left shoulder pain >6 months. No injury or trauma. Has not completed PT or imaging.     Diet and Physical Activity  Diet/Nutrition: well balanced diet.   Exercise: no formal exercise.        General Health  Sleep: sleeps well.   Hearing: normal - bilateral.  Vision: no vision problems.   Dental: regular dental visits.       /GYN Health  Follows with gynecology? yes   Patient is: premenopausal  Contraceptive method:  none .    Advanced Care Planning  Do you have an advanced directive? no  Do you have a durable medical power of ? no  ACP document given to the patient? no     Review of Systems:     Review of Systems   Constitutional:  Positive for fatigue. Negative for activity change, appetite change, chills, fever and unexpected weight change.   HENT:  Negative for hearing loss, nosebleeds, sinus pain, sneezing, sore throat and trouble swallowing.    Eyes:  Negative for photophobia and visual disturbance.   Respiratory:  Negative for cough, chest tightness, shortness of breath and wheezing.    Cardiovascular:  Negative for chest pain, palpitations and leg swelling.   Gastrointestinal:  Negative for abdominal pain, constipation, nausea and vomiting.   Genitourinary:  Negative for decreased urine volume, difficulty urinating, dysuria, flank pain, genital sores, hematuria and urgency.   Musculoskeletal:  Positive for arthralgias and myalgias. Negative for back pain and gait problem.   Skin:  Negative for pallor, rash and wound.   Neurological:  Negative  for dizziness, seizures, syncope, weakness, numbness and headaches.   Hematological:  Negative for adenopathy. Does not bruise/bleed easily.   Psychiatric/Behavioral:  Negative for confusion, hallucinations, self-injury, sleep disturbance and suicidal ideas. The patient is not nervous/anxious.       Past Medical History:     Past Medical History:   Diagnosis Date    Anemia     iron def    Arthritis     Chronic pain disorder     back    Migraines     Pneumonia 2018    Seasonal allergies     Seizure (HCC)     Seizures (HCC)     38 years ago      Past Surgical History:     Past Surgical History:   Procedure Laterality Date    EGD  12/15/2022    done by Dima White MD    KNEE SURGERY      OR EXC TUMOR SOFT TISSUE THIGH/KNEE SUBFASC 5 CM/> Right 02/03/2020    Procedure: EXCISIONAL BIOPSY SOFT  TISSUE MASS RIGHT KNEE;  Surgeon: Sebastian Babin MD;  Location:  MAIN OR;  Service: Orthopedics    OR LAPAROSCOPY SURG CHOLECYSTECTOMY N/A 01/16/2023    Procedure: CHOLECYSTECTOMY LAP;  Surgeon: Octavio Martinez MD;  Location: [facility];  Service: General    TUBAL LIGATION        Social History:     Social History     Socioeconomic History    Marital status: Single     Spouse name: None    Number of children: None    Years of education: None    Highest education level: None   Occupational History    None   Tobacco Use    Smoking status: Every Day     Current packs/day: 0.25     Average packs/day: 0.3 packs/day for 10.0 years (2.5 ttl pk-yrs)     Types: Cigarettes     Passive exposure: Current    Smokeless tobacco: Never   Vaping Use    Vaping status: Never Used   Substance and Sexual Activity    Alcohol use: No    Drug use: No    Sexual activity: Yes     Partners: Female   Other Topics Concern    None   Social History Narrative    None     Social Determinants of Health     Financial Resource Strain: Low Risk  (4/24/2024)    Overall Financial Resource Strain (CARDIA)     Difficulty of Paying Living Expenses: Not hard at  all   Food Insecurity: No Food Insecurity (4/24/2024)    Hunger Vital Sign     Worried About Running Out of Food in the Last Year: Never true     Ran Out of Food in the Last Year: Never true   Transportation Needs: No Transportation Needs (4/24/2024)    PRAPARE - Transportation     Lack of Transportation (Medical): No     Lack of Transportation (Non-Medical): No   Physical Activity: Not on file   Stress: Not on file   Social Connections: Not on file   Intimate Partner Violence: Not on file   Housing Stability: Low Risk  (4/24/2024)    Housing Stability Vital Sign     Unable to Pay for Housing in the Last Year: No     Number of Places Lived in the Last Year: 1     Unstable Housing in the Last Year: No      Family History:     Family History   Problem Relation Age of Onset    Heart disease Mother         She has Copd    Hypertension Mother       Current Medications:     Current Outpatient Medications   Medication Sig Dispense Refill    norethindrone (Ortho Micronor) 0.35 MG tablet Take 1 tablet (0.35 mg total) by mouth daily 90 tablet 3     No current facility-administered medications for this visit.      Allergies:     No Known Allergies   Physical Exam:     /78 (BP Location: Left arm, Patient Position: Sitting, Cuff Size: Standard)   Pulse 76   Temp 98 °F (36.7 °C) (Temporal)   Wt 73.7 kg (162 lb 6.4 oz)   SpO2 99%   BMI 22.65 kg/m²     Physical Exam  Vitals and nursing note reviewed.   Constitutional:       General: She is not in acute distress.     Appearance: Normal appearance. She is not diaphoretic.   HENT:      Head: Normocephalic.      Right Ear: Tympanic membrane and external ear normal.      Left Ear: Tympanic membrane and external ear normal.      Nose: Nose normal.      Mouth/Throat:      Mouth: Mucous membranes are moist.   Eyes:      General:         Right eye: No discharge.         Left eye: No discharge.      Extraocular Movements: Extraocular movements intact.      Conjunctiva/sclera:  Conjunctivae normal.      Pupils: Pupils are equal, round, and reactive to light.   Cardiovascular:      Rate and Rhythm: Normal rate and regular rhythm.      Pulses: Normal pulses.      Heart sounds: Normal heart sounds.   Pulmonary:      Effort: Pulmonary effort is normal. No respiratory distress.      Breath sounds: Normal breath sounds.   Abdominal:      General: Bowel sounds are normal. There is no distension.      Palpations: Abdomen is soft.   Musculoskeletal:      Left shoulder: Tenderness present. No effusion. Normal range of motion. Normal strength.      Cervical back: Normal range of motion and neck supple. No rigidity.      Lumbar back: Tenderness present. Normal range of motion. Negative right straight leg raise test and negative left straight leg raise test.      Right lower leg: No edema.      Left lower leg: No edema.      Comments: Generalized TTP lumbar musculature    Lymphadenopathy:      Cervical: No cervical adenopathy.   Skin:     General: Skin is warm and dry.      Capillary Refill: Capillary refill takes less than 2 seconds.      Findings: No rash.   Neurological:      General: No focal deficit present.      Mental Status: She is alert and oriented to person, place, and time.   Psychiatric:         Mood and Affect: Mood normal.         Behavior: Behavior normal.          KIM Garcia  [facility]

## 2024-04-24 NOTE — ASSESSMENT & PLAN NOTE
Lab Results   Component Value Date    WBC 5.63 10/01/2023    HGB 11.8 10/01/2023    HCT 39.8 10/01/2023    MCV 84 10/01/2023     10/01/2023     Lab Results   Component Value Date    IRON 57 05/22/2023    TIBC 344 05/22/2023    FERRITIN 15 05/22/2023     Patient reports hx of MOISES 2/2 menorrhagia, will order labs  She has not been taking iron due to constipation   She was showed a gentler form of oral iron today and agrees to start taking

## 2024-04-25 NOTE — ASSESSMENT & PLAN NOTE
Regular cycle, 5 days of heavy bleeding, goes through pads every hour, by 5th day feels weak  Will trial norethindrone to decrease flow and check pelvic U/S until she is seen by GYN for any additional recommendations   ED parameters reviewed

## 2024-05-01 ENCOUNTER — HOSPITAL ENCOUNTER (OUTPATIENT)
Dept: ULTRASOUND IMAGING | Facility: HOSPITAL | Age: 44
Discharge: HOME | End: 2024-05-01
Payer: MEDICARE

## 2024-05-01 DIAGNOSIS — N92.0 MENORRHAGIA WITH REGULAR CYCLE: ICD-10-CM

## 2024-05-01 PROCEDURE — 76856 US EXAM PELVIC COMPLETE: CPT

## 2024-05-01 PROCEDURE — 76830 TRANSVAGINAL US NON-OB: CPT

## 2024-05-21 DIAGNOSIS — N92.0 MENORRHAGIA WITH REGULAR CYCLE: ICD-10-CM

## 2024-05-21 NOTE — TELEPHONE ENCOUNTER
[facility] and was informed they did not receive prescription.

## 2024-05-22 RX ORDER — ACETAMINOPHEN AND CODEINE PHOSPHATE 120; 12 MG/5ML; MG/5ML
1 SOLUTION ORAL DAILY
Qty: 90 TABLET | Refills: 1 | Status: SHIPPED | OUTPATIENT
Start: 2024-05-22

## 2024-05-30 ENCOUNTER — HOSPITAL ENCOUNTER (EMERGENCY)
Facility: HOSPITAL | Age: 44
Discharge: HOME | End: 2024-05-30
Attending: EMERGENCY MEDICINE | Admitting: EMERGENCY MEDICINE
Payer: MEDICARE

## 2024-05-30 VITALS
SYSTOLIC BLOOD PRESSURE: 121 MMHG | TEMPERATURE: 98.6 F | WEIGHT: 159.17 LBS | OXYGEN SATURATION: 100 % | RESPIRATION RATE: 18 BRPM | HEART RATE: 87 BPM | DIASTOLIC BLOOD PRESSURE: 70 MMHG | BODY MASS INDEX: 22.2 KG/M2

## 2024-05-30 DIAGNOSIS — S16.1XXA STRAIN OF CERVICAL PORTION OF LEFT TRAPEZIUS MUSCLE: Primary | ICD-10-CM

## 2024-05-30 LAB
EXT PREGNANCY TEST URINE: NEGATIVE
EXT. CONTROL: NORMAL

## 2024-05-30 PROCEDURE — 99283 EMERGENCY DEPT VISIT LOW MDM: CPT

## 2024-05-30 PROCEDURE — 96372 THER/PROPH/DIAG INJ SC/IM: CPT

## 2024-05-30 PROCEDURE — 81025 URINE PREGNANCY TEST: CPT

## 2024-05-30 PROCEDURE — 99284 EMERGENCY DEPT VISIT MOD MDM: CPT

## 2024-05-30 RX ORDER — ACETAMINOPHEN 325 MG/1
975 TABLET ORAL ONCE
Status: COMPLETED | OUTPATIENT
Start: 2024-05-30 | End: 2024-05-30

## 2024-05-30 RX ORDER — LIDOCAINE 50 MG/G
1 PATCH TOPICAL DAILY
Qty: 10 PATCH | Refills: 0 | Status: SHIPPED | OUTPATIENT
Start: 2024-05-30 | End: 2024-06-09

## 2024-05-30 RX ORDER — CYCLOBENZAPRINE HCL 10 MG
10 TABLET ORAL 2 TIMES DAILY PRN
Qty: 20 TABLET | Refills: 0 | Status: SHIPPED | OUTPATIENT
Start: 2024-05-30

## 2024-05-30 RX ORDER — LIDOCAINE 50 MG/G
1 PATCH TOPICAL ONCE
Status: DISCONTINUED | OUTPATIENT
Start: 2024-05-30 | End: 2024-05-30 | Stop reason: HOSPADM

## 2024-05-30 RX ORDER — KETOROLAC TROMETHAMINE 30 MG/ML
15 INJECTION, SOLUTION INTRAMUSCULAR; INTRAVENOUS ONCE
Status: COMPLETED | OUTPATIENT
Start: 2024-05-30 | End: 2024-05-30

## 2024-05-30 RX ADMIN — KETOROLAC TROMETHAMINE 15 MG: 30 INJECTION, SOLUTION INTRAMUSCULAR; INTRAVENOUS at 16:17

## 2024-05-30 RX ADMIN — LIDOCAINE 1 PATCH: 50 PATCH TOPICAL at 16:14

## 2024-05-30 RX ADMIN — ACETAMINOPHEN 975 MG: 325 TABLET, FILM COATED ORAL at 16:11

## 2024-05-30 NOTE — ED PROVIDER NOTES
History  Chief Complaint   Patient presents with    Shoulder Pain     Pt reports L shoulder and neck pain. Reports hx of tendonitis.      This is a 44-year-old female who presents to the ED for evaluation of left-sided neck pain.  Patient reports this has been an intermittent issue for the last several years, she reports the pain worsened 2 days ago after waking up.  She denies any recent falls or trauma.  She reports left-sided neck pain that radiates into her left shoulder, describes the pain as an aching sensation, worse with movement of her neck.  Reports that she did try lidocaine patch yesterday with minimal improvement.  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  Denies any weakness or loss of sensation in either upper extremity, denies any recent fevers, denies any IV drug use, headaches, vision changes, loss of bowel or bladder function, weakness or loss of sensation of the extremity.        Prior to Admission Medications   Prescriptions Last Dose Informant Patient Reported? Taking?   norethindrone (Ortho Micronor) 0.35 MG tablet   No No   Sig: Take 1 tablet (0.35 mg total) by mouth daily      Facility-Administered Medications: None       Past Medical History:   Diagnosis Date    Anemia     iron def    Arthritis     Chronic pain disorder     back    Migraines     Pneumonia 2018    Seasonal allergies     Seizure (HCC)     Seizures (HCC)     38 years ago       Past Surgical History:   Procedure Laterality Date    EGD  12/15/2022    done by Dima White MD    KNEE SURGERY      MA EXC TUMOR SOFT TISSUE THIGH/KNEE SUBFASC 5 CM/> Right 02/03/2020    Procedure: EXCISIONAL BIOPSY SOFT  TISSUE MASS RIGHT KNEE;  Surgeon: Sebastian Babin MD;  Location:  MAIN OR;  Service: Orthopedics    MA LAPAROSCOPY SURG CHOLECYSTECTOMY N/A 01/16/2023    Procedure: CHOLECYSTECTOMY LAP;  Surgeon: Octavio Martinez MD;  Location: [facility];  Service: General     TUBAL LIGATION         Family History   Problem Relation Age of Onset    Heart disease Mother         She has Copd    Hypertension Mother      I have reviewed and agree with the history as documented.    E-Cigarette/Vaping    E-Cigarette Use Never User      E-Cigarette/Vaping Substances    Nicotine No     THC No     CBD No     Flavoring No     Other No     Unknown No      Social History     Tobacco Use    Smoking status: Every Day     Current packs/day: 0.25     Average packs/day: 0.3 packs/day for 10.0 years (2.5 ttl pk-yrs)     Types: Cigarettes     Passive exposure: Current    Smokeless tobacco: Never   Vaping Use    Vaping status: Never Used   Substance Use Topics    Alcohol use: No    Drug use: No       Review of Systems   Constitutional:  Negative for chills and fever.   HENT: Negative.     Eyes:  Negative for photophobia and visual disturbance.   Respiratory:  Negative for cough and shortness of breath.    Cardiovascular:  Negative for chest pain and palpitations.   Gastrointestinal:  Negative for abdominal pain, diarrhea, nausea and vomiting.   Musculoskeletal:  Positive for arthralgias (left neck pain radiates to left shoulder) and neck pain. Negative for back pain.   Neurological:  Negative for dizziness, weakness, light-headedness, numbness and headaches.   All other systems reviewed and are negative.      Physical Exam  Physical Exam  Vitals and nursing note reviewed.   Constitutional:       General: She is not in acute distress.     Appearance: She is well-developed. She is not ill-appearing or diaphoretic.      Comments: Well-appearing patient on exam.  No signs of acute distress during initial evaluation.   HENT:      Head: Normocephalic and atraumatic.   Eyes:      Conjunctiva/sclera: Conjunctivae normal.   Neck:     Cardiovascular:      Rate and Rhythm: Normal rate and regular rhythm.      Heart sounds: No murmur heard.  Pulmonary:      Effort: Pulmonary effort is normal. No respiratory distress.       Breath sounds: Normal breath sounds.   Abdominal:      Palpations: Abdomen is soft.      Tenderness: There is no abdominal tenderness.   Musculoskeletal:         General: No swelling.      Right shoulder: No swelling, deformity or tenderness. Normal range of motion.      Left shoulder: No swelling, deformity or tenderness. Normal range of motion.      Cervical back: Normal range of motion and neck supple. No swelling or deformity. Normal range of motion.      Thoracic back: No swelling, deformity or tenderness. Normal range of motion.      Lumbar back: No swelling, deformity or tenderness. Normal range of motion.        Back:       Comments: Examination of patient's cervical, thoracic, lumbar spine shows no signs of midline tenderness or step-off deformities.  Normal range of motion in patient's neck, normal range of motion in left arm, able to lift her left arm above her head.  Strength 5/5 and equal in bilateral upper extremities with proximal and distal sensation intact and normal pulses.   Skin:     General: Skin is warm and dry.      Capillary Refill: Capillary refill takes less than 2 seconds.   Neurological:      General: No focal deficit present.      Mental Status: She is alert and oriented to person, place, and time.   Psychiatric:         Mood and Affect: Mood normal.         Vital Signs  ED Triage Vitals [05/30/24 1533]   Temperature Pulse Respirations Blood Pressure SpO2   98.6 °F (37 °C) 87 18 121/70 100 %      Temp Source Heart Rate Source Patient Position - Orthostatic VS BP Location FiO2 (%)   Oral Monitor Sitting Right arm --      Pain Score       10 - Worst Possible Pain           Vitals:    05/30/24 1533   BP: 121/70   Pulse: 87   Patient Position - Orthostatic VS: Sitting         Visual Acuity      ED Medications  Medications   ketorolac (TORADOL) injection 15 mg (has no administration in time range)   lidocaine (LIDODERM) 5 % patch 1 patch (has no administration in time range)   acetaminophen  (TYLENOL) tablet 975 mg (975 mg Oral Given 5/30/24 1611)       Diagnostic Studies  Results Reviewed       Procedure Component Value Units Date/Time    POCT pregnancy, urine [[id number]]  (Normal) Resulted: 05/30/24 1610    Lab Status: Final result Updated: 05/30/24 1610     EXT Preg Test, Ur Negative     Control Valid                   No orders to display              Procedures  Procedures         ED Course                               SBIRT 22yo+      Flowsheet Row Most Recent Value   Initial Alcohol Screen: US AUDIT-C     1. How often do you have a drink containing alcohol? 0 Filed at: 05/30/2024 1608   2. How many drinks containing alcohol do you have on a typical day you are drinking?  0 Filed at: 05/30/2024 1608   3a. Male UNDER 65: How often do you have five or more drinks on one occasion? 0 Filed at: 05/30/2024 1608   3b. FEMALE Any Age, or MALE 65+: How often do you have 4 or more drinks on one occassion? 0 Filed at: 05/30/2024 1608   Audit-C Score 0 Filed at: 05/30/2024 1608   [place]: How many times in the past year have you...    Used an illegal drug or used a prescription medication for non-medical reasons? Never Filed at: 05/30/2024 1608                      Medical Decision Making  43 female presents to the ED for evaluation of left-sided neck pain.  Reports this is a chronic issue though worse over the last 2 days after waking up.  No fevers, denies IV drug use, denies weakness or loss of sensation in either extremity.  Patient given lidocaine patch, Tylenol, Toradol in ED.  Muscle relaxer sent to patient's pharmacy, advised not to drive or operate heavy machinery after use.  Given ambulatory referral to comprehensive spine program for further evaluation and management.  ED return precautions discussed with patient.  Patient verbalized understanding and agreement with plan.    Amount and/or Complexity of Data Reviewed  Labs: ordered.    Risk  OTC drugs.  Prescription drug management.              Disposition  Final diagnoses:   Strain of cervical portion of left trapezius muscle     Time reflects when diagnosis was documented in both MDM as applicable and the Disposition within this note       Time User Action Codes Description Comment    5/30/2024  4:05 PM Giovanny Huang [M54.2] Neck pain on left side     5/30/2024  4:05 PM Giovanny Huang [M54.2] Neck pain on left side     5/30/2024  4:05 PM Giovanny Huang [S16.1XXA] Strain of cervical portion of left trapezius muscle     5/30/2024  4:05 PM Giovanny Huang [S16.1XXA] Strain of cervical portion of left trapezius muscle     5/30/2024  4:05 PM Giovanny Huang [S16.1XXA] Strain of cervical portion of left trapezius muscle           ED Disposition       ED Disposition   Discharge    Condition   Stable    Date/Time   u May 30, 2024 1606    Comment   Kira Barrow discharge to home/self care.                   Follow-up Information       Follow up With Specialties Details Why Contact Info    [facility], Nurse Practitioner Schedule an appointment as soon as possible for a visit  For re-check [address] [place] [address]  [phone]              Patient's Medications   Discharge Prescriptions    CYCLOBENZAPRINE (FLEXERIL) 10 MG TABLET    Take 1 tablet (10 mg total) by mouth 2 (two) times a day as needed for muscle spasms       Start Date: 5/30/2024 End Date: --       Order Dose: 10 mg       Quantity: 20 tablet    Refills: 0           PDMP Review         Value Time User    PDMP Reviewed  Yes 2/4/2020 10:31 AM Sebastian Babin MD            ED Provider  Electronically Signed by             Giovanny Huang PA-C  05/30/24 [id number]

## 2024-05-30 NOTE — Clinical Note
Kira Barrow was seen and treated in our emergency department on 5/30/2024.                Diagnosis:     Kira  .    She may return on this date: 06/01/2024         If you have any questions or concerns, please don't hesitate to call.      Giovanny Huang PA-C    ______________________________           _______________          _______________  Hospital Representative                              Date                                Time

## 2024-05-30 NOTE — DISCHARGE INSTRUCTIONS
Take 650mg of acetaminophen (Tylenol) with 400 mg of ibuprofen (Advil) every 6-8 hours as needed for pain.  Lidocaine patches are available over-the-counter can be found in the back pain aisle of most pharmacies.  Look for 4% lidocaine in the list of the active ingredients.  These patches can be placed for 12 hours.  After 12 hours, discard the patch.  The next patch can be placed 12 hours later.  You may take your Flexeril as needed.  Be advised it is a muscle relaxer and may cause drowsiness, do not drive or operate heavy machinery after use.  Return to the ED if you develop any new or worsening symptoms

## 2024-05-31 ENCOUNTER — NURSE TRIAGE (OUTPATIENT)
Dept: PHYSICAL THERAPY | Facility: OTHER | Age: 44
End: 2024-05-31

## 2024-05-31 DIAGNOSIS — M54.2 NECK PAIN ON LEFT SIDE: Primary | ICD-10-CM

## 2024-05-31 NOTE — TELEPHONE ENCOUNTER
Additional Information   Negative: Is this related to a work injury?   Negative: Is this related to an MVA?   Negative: Are you currently recieving homecare services?   Negative: Has the patient had unexplained weight loss?   Negative: Does the patient have a fever?   Negative: Is the patient experiencing urine retention?   Negative: Is the patient experiencing acute drop foot or paralysis?   Negative: Has the patient experienced major trauma? (fall from height, high speed collision, direct blow to spine) and is also experiencing nausea, light-headedness, or loss of consciousness?   Negative: Is the patient experiencing blood in sputum?   Negative: Is this a chronic condition?    Background - Initial Assessment  Clinical complaint:Left sided neck pain that radiates to Left shoulder  Date of onset: 3 days ago NKI/ Warranted ED visit  Frequency of pain: intermittent  Quality of pain: Sharp, shooting, stabbing and aching    Protocols used: [facility] Protocol    Patient seen in ED yesterday d/t acute L sided neck & shoulder pain. Please see notes.  This RN did review the [facility] in detail and the services offered for her Back or Neck pain. Patient stated she has a hx of shoulder pain and received injections approximately 2 years ago. No significant relief. This RN assured the patient she can discuss her Neck pain and prior hx with the clinician.  Patient is agreeable to triage and would like to participate in PT evaluation with [facility]. Patient to discuss current/past complaints, HX, possible treatment plan(s), and any necessary referrals or interventions with the DPT at time of service.  Triaged and NO RF s/s Present.    Referral entered for the [facility] and contact information provided to the patient as well. Patient is aware clerical will call to assist with scheduling. Nurse encouraged the patient to call the site if she does not hear from clerical  beforehand. Patient agreed.  Nurse also offered a call from the  counselor d/t SBT score. Patient declined. Patient was pleasant and appropriate during this encounter.   Patient did not voice any additional questions or concerns at this time.   All information regarding plan to be evaluated by the therapist/DPT was reviewed.  Patient is in agreement with nurse's explanation of intended care path.  Patient was very appreciative of call, triage, and referral placement.    Nurse wished her well and referral closed per protocol.

## 2024-07-16 ENCOUNTER — OFFICE VISIT (OUTPATIENT)
Dept: FAMILY MEDICINE CLINIC | Facility: CLINIC | Age: 44
End: 2024-07-16

## 2024-07-16 ENCOUNTER — APPOINTMENT (OUTPATIENT)
Dept: LAB | Facility: CLINIC | Age: 44
End: 2024-07-16
Payer: MEDICARE

## 2024-07-16 VITALS
BODY MASS INDEX: 22.54 KG/M2 | RESPIRATION RATE: 16 BRPM | SYSTOLIC BLOOD PRESSURE: 109 MMHG | HEART RATE: 70 BPM | DIASTOLIC BLOOD PRESSURE: 73 MMHG | OXYGEN SATURATION: 99 % | WEIGHT: 161 LBS | TEMPERATURE: 98 F | HEIGHT: 71 IN

## 2024-07-16 DIAGNOSIS — D50.0 IRON DEFICIENCY ANEMIA DUE TO CHRONIC BLOOD LOSS: ICD-10-CM

## 2024-07-16 DIAGNOSIS — R07.89 CHEST TIGHTNESS: ICD-10-CM

## 2024-07-16 DIAGNOSIS — Z13.1 SCREENING FOR DIABETES MELLITUS: ICD-10-CM

## 2024-07-16 DIAGNOSIS — Z13.220 SCREENING CHOLESTEROL LEVEL: ICD-10-CM

## 2024-07-16 DIAGNOSIS — M25.561 CHRONIC PAIN OF BOTH KNEES: ICD-10-CM

## 2024-07-16 DIAGNOSIS — S16.1XXA STRAIN OF CERVICAL PORTION OF LEFT TRAPEZIUS MUSCLE: ICD-10-CM

## 2024-07-16 DIAGNOSIS — G89.29 CHRONIC PAIN OF BOTH KNEES: ICD-10-CM

## 2024-07-16 DIAGNOSIS — N92.0 MENORRHAGIA WITH REGULAR CYCLE: ICD-10-CM

## 2024-07-16 DIAGNOSIS — D50.0 IRON DEFICIENCY ANEMIA DUE TO CHRONIC BLOOD LOSS: Primary | ICD-10-CM

## 2024-07-16 DIAGNOSIS — M25.562 CHRONIC PAIN OF BOTH KNEES: ICD-10-CM

## 2024-07-16 LAB
ALBUMIN SERPL BCG-MCNC: 4 G/DL (ref 3.5–5)
ALP SERPL-CCNC: 58 U/L (ref 34–104)
ALT SERPL W P-5'-P-CCNC: 11 U/L (ref 7–52)
ANION GAP SERPL CALCULATED.3IONS-SCNC: 6 MMOL/L (ref 4–13)
AST SERPL W P-5'-P-CCNC: 17 U/L (ref 13–39)
BASOPHILS # BLD AUTO: 0.03 THOUSANDS/ÂΜL (ref 0–0.1)
BASOPHILS NFR BLD AUTO: 1 % (ref 0–1)
BILIRUB SERPL-MCNC: 0.39 MG/DL (ref 0.2–1)
BUN SERPL-MCNC: 7 MG/DL (ref 5–25)
CALCIUM SERPL-MCNC: 9.4 MG/DL (ref 8.4–10.2)
CHLORIDE SERPL-SCNC: 106 MMOL/L (ref 96–108)
CHOLEST SERPL-MCNC: 140 MG/DL
CO2 SERPL-SCNC: 25 MMOL/L (ref 21–32)
CREAT SERPL-MCNC: 0.84 MG/DL (ref 0.6–1.3)
EOSINOPHIL # BLD AUTO: 0.22 THOUSAND/ÂΜL (ref 0–0.61)
EOSINOPHIL NFR BLD AUTO: 4 % (ref 0–6)
ERYTHROCYTE [DISTWIDTH] IN BLOOD BY AUTOMATED COUNT: 14.6 % (ref 11.6–15.1)
EST. AVERAGE GLUCOSE BLD GHB EST-MCNC: 117 MG/DL
FERRITIN SERPL-MCNC: 7 NG/ML (ref 11–307)
GFR SERPL CREATININE-BSD FRML MDRD: 84 ML/MIN/1.73SQ M
GLUCOSE P FAST SERPL-MCNC: 80 MG/DL (ref 65–99)
HBA1C MFR BLD: 5.7 %
HCT VFR BLD AUTO: 43.1 % (ref 34.8–46.1)
HDLC SERPL-MCNC: 60 MG/DL
HGB BLD-MCNC: 12.9 G/DL (ref 11.5–15.4)
IMM GRANULOCYTES # BLD AUTO: 0.01 THOUSAND/UL (ref 0–0.2)
IMM GRANULOCYTES NFR BLD AUTO: 0 % (ref 0–2)
IRON SATN MFR SERPL: 18 % (ref 15–50)
IRON SERPL-MCNC: 71 UG/DL (ref 50–212)
LDLC SERPL CALC-MCNC: 69 MG/DL (ref 0–100)
LYMPHOCYTES # BLD AUTO: 2.09 THOUSANDS/ÂΜL (ref 0.6–4.47)
LYMPHOCYTES NFR BLD AUTO: 39 % (ref 14–44)
MCH RBC QN AUTO: 24.6 PG (ref 26.8–34.3)
MCHC RBC AUTO-ENTMCNC: 29.9 G/DL (ref 31.4–37.4)
MCV RBC AUTO: 82 FL (ref 82–98)
MONOCYTES # BLD AUTO: 0.46 THOUSAND/ÂΜL (ref 0.17–1.22)
MONOCYTES NFR BLD AUTO: 9 % (ref 4–12)
NEUTROPHILS # BLD AUTO: 2.6 THOUSANDS/ÂΜL (ref 1.85–7.62)
NEUTS SEG NFR BLD AUTO: 47 % (ref 43–75)
NONHDLC SERPL-MCNC: 80 MG/DL
NRBC BLD AUTO-RTO: 0 /100 WBCS
PLATELET # BLD AUTO: 177 THOUSANDS/UL (ref 149–390)
PMV BLD AUTO: 11.6 FL (ref 8.9–12.7)
POTASSIUM SERPL-SCNC: 3.9 MMOL/L (ref 3.5–5.3)
PROT SERPL-MCNC: 7.6 G/DL (ref 6.4–8.4)
RBC # BLD AUTO: 5.25 MILLION/UL (ref 3.81–5.12)
SODIUM SERPL-SCNC: 137 MMOL/L (ref 135–147)
TIBC SERPL-MCNC: 384 UG/DL (ref 250–450)
TRIGL SERPL-MCNC: 56 MG/DL
TSH SERPL DL<=0.05 MIU/L-ACNC: 1.75 UIU/ML (ref 0.45–4.5)
UIBC SERPL-MCNC: 313 UG/DL (ref 155–355)
VIT B12 SERPL-MCNC: 147 PG/ML (ref 180–914)
WBC # BLD AUTO: 5.41 THOUSAND/UL (ref 4.31–10.16)

## 2024-07-16 PROCEDURE — 83540 ASSAY OF IRON: CPT

## 2024-07-16 PROCEDURE — 84443 ASSAY THYROID STIM HORMONE: CPT

## 2024-07-16 PROCEDURE — 80061 LIPID PANEL: CPT

## 2024-07-16 PROCEDURE — 80053 COMPREHEN METABOLIC PANEL: CPT

## 2024-07-16 PROCEDURE — 83550 IRON BINDING TEST: CPT

## 2024-07-16 PROCEDURE — 82607 VITAMIN B-12: CPT

## 2024-07-16 PROCEDURE — 82728 ASSAY OF FERRITIN: CPT

## 2024-07-16 PROCEDURE — 36415 COLL VENOUS BLD VENIPUNCTURE: CPT

## 2024-07-16 PROCEDURE — 83036 HEMOGLOBIN GLYCOSYLATED A1C: CPT

## 2024-07-16 PROCEDURE — 85025 COMPLETE CBC W/AUTO DIFF WBC: CPT

## 2024-07-16 PROCEDURE — 99214 OFFICE O/P EST MOD 30 MIN: CPT | Performed by: NURSE PRACTITIONER

## 2024-07-16 RX ORDER — CYCLOBENZAPRINE HCL 10 MG
10 TABLET ORAL 2 TIMES DAILY PRN
Qty: 40 TABLET | Refills: 0 | Status: SHIPPED | OUTPATIENT
Start: 2024-07-16

## 2024-07-16 RX ORDER — LIDOCAINE 50 MG/G
1 PATCH TOPICAL DAILY
Qty: 30 PATCH | Refills: 2 | Status: SHIPPED | OUTPATIENT
Start: 2024-07-16 | End: 2024-07-26

## 2024-07-16 RX ORDER — ALBUTEROL SULFATE 90 UG/1
2 AEROSOL, METERED RESPIRATORY (INHALATION) EVERY 6 HOURS PRN
Qty: 18 G | Refills: 5 | Status: SHIPPED | OUTPATIENT
Start: 2024-07-16

## 2024-07-16 NOTE — PATIENT INSTRUCTIONS
Breast Cancer Screening    Breast cancer is the most common cancer in females in the [place] and the second most common cause of cancer death in women    The risk of breast cancer from birth to death is 12.9 percent (1 in 8 women)    Approximately 43,000 women will die in the [place] annually from breast cancer    Mammography    A mammogram is an x-ray of your breasts to screen for breast cancer    It uses the least amount of radiation necessary to provide the highest quality image able to detect early signs of breast cancer.    For most women, we recommend getting your first mammogram at the age of 40 and repeating it yearly         Signs of Breast Cancer    Lumps  Thickening or swelling of parts of the breast  Irritation or dimpling of breast skin  Red or flaky skin in the nipple area  Pain in the nipple area  Pulling in of the nipple  Change in size or shape of the breast  Pain in any area of the breast     Did you know by getting a mammogram, doctors can find breast cancer 3 YEARS before a lump is even felt!    Early detection is BEST!  Schedule your mammogram TODAY!     To schedule this appointment with [facility], please contact Central Scheduling at [phone]

## 2024-07-16 NOTE — ASSESSMENT & PLAN NOTE
Increased chest tightness/ shortness of breath in the hot weather   Trial albuterol inhaler, consider PFT's

## 2024-07-16 NOTE — ASSESSMENT & PLAN NOTE
Chronic issue, worse in the heat and with excessive activity   Will continue lidocaine topical PRN and alternate with topical diclofenac

## 2024-07-16 NOTE — PROGRESS NOTES
Ambulatory Visit  Name: Kira Barrow      : 1980      MRN: [id number]  Encounter Provider: KIM Garcia  Encounter Date: 2024   Encounter department: [facility]    Assessment & Plan   1. Iron deficiency anemia due to chronic blood loss  -     CBC and differential; Future  -     Iron Panel (Includes Ferritin, Iron Sat%, Iron, and TIBC); Future  -     Vitamin B12; Future  2. Strain of cervical portion of left trapezius muscle  -     Diclofenac Sodium (VOLTAREN) 1 %; Apply 2 g topically 4 (four) times a day  -     cyclobenzaprine (FLEXERIL) 10 mg tablet; Take 1 tablet (10 mg total) by mouth 2 (two) times a day as needed for muscle spasms  -     lidocaine (Lidoderm) 5 %; Apply 1 patch topically over 12 hours daily for 10 doses Remove & Discard patch within 12 hours or as directed by MD  -     Ambulatory Referral to Orthopedic Surgery; Future  3. Screening cholesterol level  -     Lipid panel; Future  4. Screening for diabetes mellitus  -     Hemoglobin A1C; Future  5. Menorrhagia with regular cycle  Assessment & Plan:  Regular cycle, 5 days of heavy bleeding, goes through pads every hour, by 5th day feels weak   norethindrone did not change sx, D/C   ED parameters reviewed   Orders:  -     CBC and differential; Future  -     Comprehensive metabolic panel; Future  -     TSH, 3rd generation with Free T4 reflex; Future  6. Chest tightness  Assessment & Plan:  Increased chest tightness/ shortness of breath in the hot weather   Trial albuterol inhaler, consider PFT's   Orders:  -     albuterol (Ventolin HFA) 90 mcg/act inhaler; Inhale 2 puffs every 6 (six) hours as needed for wheezing  7. Chronic pain of both knees  Assessment & Plan:  Chronic issue, worse in the heat and with excessive activity   Will continue lidocaine topical PRN and alternate with topical diclofenac       Depression Screening and Follow-up Plan: Patient's depression screening was  positive with a PHQ-9 score of 7. Patient assessed for underlying major depression. Brief counseling provided and recommend additional follow-up/re-evaluation next office visit.         History of Present Illness     Patient is a 45 YO female who  has a past medical history of Anemia, Arthritis, Chronic pain disorder, Migraines, Pneumonia (2018), Seasonal allergies, Seizure (HCC), and Seizures (HCC) who presents today for follow up.     The following portions of the patient's history were reviewed and updated as appropriate: allergies, current medications, past family history, past medical history, past social history, past surgical history and problem list.      Review of Systems   Constitutional:  Negative for activity change, appetite change, chills, fatigue, fever and unexpected weight change.   HENT:  Negative for hearing loss, nosebleeds, sinus pain, sneezing, sore throat and trouble swallowing.    Eyes:  Negative for photophobia and visual disturbance.   Respiratory:  Negative for cough, chest tightness, shortness of breath and wheezing.    Cardiovascular:  Negative for chest pain, palpitations and leg swelling.   Gastrointestinal:  Negative for abdominal pain, constipation, nausea and vomiting.   Genitourinary:  Negative for decreased urine volume, difficulty urinating, dysuria, flank pain, genital sores, hematuria and urgency.   Musculoskeletal:  Positive for arthralgias, joint swelling and myalgias. Negative for back pain and gait problem.   Skin:  Negative for pallor, rash and wound.   Neurological:  Negative for dizziness, seizures, syncope, weakness, numbness and headaches.   Hematological:  Negative for adenopathy. Does not bruise/bleed easily.   Psychiatric/Behavioral:  Negative for confusion, hallucinations, self-injury, sleep disturbance and suicidal ideas. The patient is not nervous/anxious.      Past Medical History:   Diagnosis Date    Anemia     iron def    Arthritis     Chronic pain disorder   "   back    Migraines     Pneumonia 2018    Seasonal allergies     Seizure (HCC)     Seizures (HCC)     38 years ago     Past Surgical History:   Procedure Laterality Date    EGD  12/15/2022    done by Dima White MD    KNEE SURGERY      DC EXC TUMOR SOFT TISSUE THIGH/KNEE SUBFASC 5 CM/> Right 02/03/2020    Procedure: EXCISIONAL BIOPSY SOFT  TISSUE MASS RIGHT KNEE;  Surgeon: Sebastian Babin MD;  Location:  MAIN OR;  Service: Orthopedics    DC LAPAROSCOPY SURG CHOLECYSTECTOMY N/A 01/16/2023    Procedure: CHOLECYSTECTOMY LAP;  Surgeon: Octavio Martinez MD;  Location: [facility];  Service: General    TUBAL LIGATION       Family History   Problem Relation Age of Onset    Heart disease Mother         She has Copd    Hypertension Mother      Social History     Tobacco Use    Smoking status: Every Day     Current packs/day: 0.25     Average packs/day: 0.3 packs/day for 10.0 years (2.5 ttl pk-yrs)     Types: Cigarettes     Passive exposure: Current    Smokeless tobacco: Never   Vaping Use    Vaping status: Never Used   Substance and Sexual Activity    Alcohol use: No    Drug use: No    Sexual activity: Yes     Partners: Female     Current Outpatient Medications on File Prior to Visit   Medication Sig    norethindrone (Ortho Micronor) 0.35 MG tablet Take 1 tablet (0.35 mg total) by mouth daily    [DISCONTINUED] cyclobenzaprine (FLEXERIL) 10 mg tablet Take 1 tablet (10 mg total) by mouth 2 (two) times a day as needed for muscle spasms    [DISCONTINUED] lidocaine (Lidoderm) 5 % Apply 1 patch topically over 12 hours daily for 10 doses Remove & Discard patch within 12 hours or as directed by MD     No Known Allergies  Immunization History   Administered Date(s) Administered    COVID-19 MODERNA VACC 0.5 ML IM 06/28/2022, 07/26/2022    Tdap 12/06/2018     Objective     /73 (BP Location: Left arm, Patient Position: Sitting, Cuff Size: Standard)   Pulse 70   Temp 98 °F (36.7 °C) (Temporal)   Resp 16   Ht 5' 11\" " (1.803 m)   Wt 73 kg (161 lb)   SpO2 99%   BMI 22.45 kg/m²     Physical Exam  Vitals and nursing note reviewed.   Constitutional:       General: She is not in acute distress.     Appearance: Normal appearance. She is not diaphoretic.   HENT:      Head: Normocephalic.      Right Ear: External ear normal.      Left Ear: External ear normal.   Eyes:      General:         Right eye: No discharge.         Left eye: No discharge.      Conjunctiva/sclera: Conjunctivae normal.   Cardiovascular:      Rate and Rhythm: Normal rate and regular rhythm.   Pulmonary:      Effort: Pulmonary effort is normal. No respiratory distress.      Breath sounds: Normal breath sounds.   Musculoskeletal:      Left shoulder: Tenderness present. Decreased range of motion.      Cervical back: Normal range of motion and neck supple. No rigidity.      Right knee: Tenderness present.      Left knee: Tenderness present.   Lymphadenopathy:      Cervical: No cervical adenopathy.   Skin:     General: Skin is warm and dry.      Capillary Refill: Capillary refill takes less than 2 seconds.   Neurological:      General: No focal deficit present.      Mental Status: She is alert and oriented to person, place, and time.   Psychiatric:         Mood and Affect: Mood normal.         Behavior: Behavior normal.

## 2024-07-16 NOTE — ASSESSMENT & PLAN NOTE
Regular cycle, 5 days of heavy bleeding, goes through pads every hour, by 5th day feels weak   norethindrone did not change sx, D/C   ED parameters reviewed

## 2024-08-15 ENCOUNTER — HOSPITAL ENCOUNTER (EMERGENCY)
Facility: HOSPITAL | Age: 44
Discharge: HOME | End: 2024-08-15
Payer: MEDICARE

## 2024-08-15 VITALS
TEMPERATURE: 98.4 F | OXYGEN SATURATION: 99 % | WEIGHT: 159.17 LBS | HEART RATE: 79 BPM | BODY MASS INDEX: 22.2 KG/M2 | DIASTOLIC BLOOD PRESSURE: 100 MMHG | RESPIRATION RATE: 20 BRPM | SYSTOLIC BLOOD PRESSURE: 136 MMHG

## 2024-08-15 DIAGNOSIS — M25.562 ACUTE PAIN OF LEFT KNEE: Primary | ICD-10-CM

## 2024-08-15 PROCEDURE — 99284 EMERGENCY DEPT VISIT MOD MDM: CPT

## 2024-08-15 PROCEDURE — 99283 EMERGENCY DEPT VISIT LOW MDM: CPT

## 2024-08-15 RX ORDER — IBUPROFEN 600 MG/1
600 TABLET, FILM COATED ORAL EVERY 6 HOURS PRN
Qty: 30 TABLET | Refills: 0 | Status: SHIPPED | OUTPATIENT
Start: 2024-08-15

## 2024-08-15 RX ORDER — IBUPROFEN 600 MG/1
600 TABLET, FILM COATED ORAL ONCE
Status: COMPLETED | OUTPATIENT
Start: 2024-08-15 | End: 2024-08-15

## 2024-08-15 RX ORDER — ACETAMINOPHEN 325 MG/1
975 TABLET ORAL ONCE
Status: COMPLETED | OUTPATIENT
Start: 2024-08-15 | End: 2024-08-15

## 2024-08-15 RX ADMIN — IBUPROFEN 600 MG: 600 TABLET, FILM COATED ORAL at 08:28

## 2024-08-15 RX ADMIN — ACETAMINOPHEN 975 MG: 325 TABLET ORAL at 08:28

## 2024-08-15 RX ADMIN — DICLOFENAC SODIUM 2 G: 10 GEL TOPICAL at 08:41

## 2024-08-15 NOTE — ED ATTENDING ATTESTATION
"8/15/2024  IJurgen MD, saw and evaluated the patient. I have discussed the patient with the resident/non-physician practitioner and agree with the resident's/non-physician practitioner's findings, Plan of Care, and MDM as documented in the resident's/non-physician practitioner's note, except where noted. All available labs and Radiology studies were reviewed.  I was present for key portions of any procedure(s) performed by the resident/non-physician practitioner and I was immediately available to provide assistance.       At this point I agree with the current assessment done in the Emergency Department.  I have conducted an independent evaluation of this patient a history and physical is as follows:    Final Diagnoses:     1. Acute pain of left knee             HPI:  NURSING TRIAGE:    This is a 44 y.o. female presenting for evaluation of L knee pain. Felt \"pop\" in knee yesterday while walking up a step and has persistent pain since. Worse with movement specifically worse with knee flexion. Able to bear weight. No fall or other injuries. No other symptoms.  Chief Complaint   Patient presents with    Knee Pain     Left knee pain since yesterday. Denies injury      PHYSICAL: ASSESSMENT + PLAN:   Pertinent:   MSK: Mild left knee effusion, no overlying skin changes. Pain with active flexion. Patella midline, normal quad strength. No Baker's cyst. Popliteal a 2+. No excessive joint laxity. Distal extremity NVI.     General: VS reviewed  Appears in NAD  awake, alert.   Well-nourished, well-developed. Appears stated age.   Speaking normally in full sentences.   Head: Normocephalic, atraumatic  Eyes: EOM-I. No diplopia.   ENT: Atraumatic external nose and ears.    MMM.   CV: No pallor noted  Skin: Dry, intact.   Neuro: Awake, alert, GCS15, CN II-XII grossly intact.   Motor grossly intact.  Psychiatric/Behavioral: interacting normally; appropriate mood/affect.    Exam: deferred    Vitals:    08/15/24 0738 "   BP: 136/100   BP Location: Left arm   Pulse: 79   Resp: 20   Temp: 98.4 °F (36.9 °C)   TempSrc: Oral   SpO2: 99%   Weight: 72.2 kg (159 lb 2.8 oz)    43 y/o F with acute left knee pain while walking up a step.    Suspect ligamentous vs mensicus injury. Joint is stable on exam with no red flag findings.    Will place in knee immobilizer, provide crutches, analgesia, and recommend ortho f/u.     RTED precautions given, all questions answered. Pt stable for discharge.      There are no obvious limitations to social determinants of care.   Nursing note reviewed.   Vitals reviewed.   Orders placed by myself and/or advanced practitioner / resident.    Previous chart was reviewed  No language barrier.   History obtained from patient.    There are no limitations to the history obtained:     Past Medical: Past Surgical:    has a past medical history of Anemia, Arthritis, Chronic pain disorder, Migraines, Pneumonia (2018), Seasonal allergies, Seizure (HCC), and Seizures (HCC).  has a past surgical history that includes Tubal ligation; pr exc tumor soft tissue thigh/knee subfasc 5 cm/> (Right, 02/03/2020); EGD (12/15/2022); pr laparoscopy surg cholecystectomy (N/A, 01/16/2023); and Knee surgery.   Social: Cardiac (Echo/Cath)   Social History     Substance and Sexual Activity   Alcohol Use No     Social History     Tobacco Use   Smoking Status Every Day    Current packs/day: 0.25    Average packs/day: 0.3 packs/day for 10.0 years (2.5 ttl pk-yrs)    Types: Cigarettes    Passive exposure: Current   Smokeless Tobacco Never     Social History     Substance and Sexual Activity   Drug Use No    No results found for this or any previous visit.    No results found for this or any previous visit.    No results found for this or any previous visit.     Labs: Imaging:   Labs Reviewed - No data to display No orders to display      Medications: Code Status:   Medications   Diclofenac Sodium (VOLTAREN) 1 % topical gel 2 g (has no  administration in time range)   ibuprofen (MOTRIN) tablet 600 mg (600 mg Oral Given 8/15/24 0828)   acetaminophen (TYLENOL) tablet 975 mg (975 mg Oral Given 8/15/24 0828)    Code Status: No Order  Advance Directive and Living Will:      Power of :    POLST:       Orders Placed This Encounter   Procedures    Crutches    Ambulatory Referral to Orthopedic Surgery    Knee Immobilizer     Time reflects when diagnosis was documented in both MDM as applicable and the Disposition within this note       Time User Action Codes Description Comment    8/15/2024  8:29 AM Jurgen Gonzales [M25.562] Acute pain of left knee           ED Disposition       ED Disposition   Discharge    Condition   Stable    Date/Time   Thu Aug 15, 2024  8:19 AM    Comment   Kira Barrow discharge to home/self care.                   Follow-up Information       Follow up With Specialties Details Why Contact Info Additional Information    [facility] Schedule an appointment as soon as possible for a visit   [address] [place] [id number] [phone] [facility] [address], [address] [phone]  Use Entrance A           Patient's Medications   Discharge Prescriptions    DICLOFENAC SODIUM (VOLTAREN) 1 %    Apply 2 g topically 4 (four) times a day       Start Date: 8/15/2024 End Date: --       Order Dose: 2 g       Quantity: 100 g    Refills: 0    IBUPROFEN (MOTRIN) 600 MG TABLET    Take 1 tablet (600 mg total) by mouth every 6 (six) hours as needed for mild pain       Start Date: 8/15/2024 End Date: --       Order Dose: 600 mg       Quantity: 30 tablet    Refills: 0       Prior to Admission Medications   Prescriptions Last Dose Informant Patient Reported? Taking?   Diclofenac Sodium (VOLTAREN) 1 %   No No   Sig: Apply 2 g topically 4 (four) times a day   albuterol (Ventolin HFA) 90 mcg/act  "inhaler   No No   Sig: Inhale 2 puffs every 6 (six) hours as needed for wheezing   cyclobenzaprine (FLEXERIL) 10 mg tablet   No No   Sig: Take 1 tablet (10 mg total) by mouth 2 (two) times a day as needed for muscle spasms   lidocaine (Lidoderm) 5 %   No No   Sig: Apply 1 patch topically over 12 hours daily for 10 doses Remove & Discard patch within 12 hours or as directed by MD   norethindrone (Ortho Micronor) 0.35 MG tablet   No No   Sig: Take 1 tablet (0.35 mg total) by mouth daily      Facility-Administered Medications: None                        Portions of the record may have been created with voice recognition software. Occasional wrong word or \"sound a like\" substitutions may have occurred due to the inherent limitations of voice recognition software. Read the chart carefully and recognize, using context, where substitutions have occurred.    Electronically signed by:  Jurgen Gonzales      ED Course         Critical Care Time  Procedures      "

## 2024-08-15 NOTE — Clinical Note
Kira Barrow was seen and treated in our emergency department on 8/15/2024.                Diagnosis: Left knee pain    Kira  may return to work on return date.    She may return on this date: 08/16/2024    May have limitations due to acute left knee pain and need for crutches/knee immobilizer. Should follow up with orthopedics within 2-4 weeks.      If you have any questions or concerns, please don't hesitate to call.      Jurgen Gonzales MD    ______________________________           _______________          _______________  Hospital Representative                              Date                                Time

## 2024-08-15 NOTE — ED PROVIDER NOTES
"History  Chief Complaint   Patient presents with    Knee Pain     Left knee pain since yesterday. Denies injury     45 y/o femmale w/a PMH of chronic knee pain and bilateral sciatica presents today with left medial knee pain after walking up the stairs and hearing a popping sound. No history of trauma, non radiating, constant pain worse with movement.        Knee Pain  Pain details:     Quality:  Sharp    Radiates to:  Does not radiate    Severity:  Severe (\"12/10\")    Onset quality:  Sudden    Duration:  1 day    Timing:  Constant    Progression:  Unchanged  Chronicity:  New  Dislocation: no    Foreign body present:  No foreign bodies  Prior injury to area: Chronic knee pain bilaterally.  Relieved by:  None tried  Worsened by:  Flexion, bearing weight and activity  Ineffective treatments:  None tried  Associated symptoms: swelling (Mild effusion)    Associated symptoms: no back pain, no decreased ROM, no fatigue, no fever, no itching, no muscle weakness, no neck pain, no numbness and no stiffness    Risk factors: no known bone disorder and no recent illness        Prior to Admission Medications   Prescriptions Last Dose Informant Patient Reported? Taking?   Diclofenac Sodium (VOLTAREN) 1 %   No No   Sig: Apply 2 g topically 4 (four) times a day   albuterol (Ventolin HFA) 90 mcg/act inhaler   No No   Sig: Inhale 2 puffs every 6 (six) hours as needed for wheezing   cyclobenzaprine (FLEXERIL) 10 mg tablet   No No   Sig: Take 1 tablet (10 mg total) by mouth 2 (two) times a day as needed for muscle spasms   lidocaine (Lidoderm) 5 %   No No   Sig: Apply 1 patch topically over 12 hours daily for 10 doses Remove & Discard patch within 12 hours or as directed by MD   norethindrone (Ortho Micronor) 0.35 MG tablet   No No   Sig: Take 1 tablet (0.35 mg total) by mouth daily      Facility-Administered Medications: None       Past Medical History:   Diagnosis Date    Anemia     iron def    Arthritis     Chronic pain disorder     " back    Migraines     Pneumonia 2018    Seasonal allergies     Seizure (HCC)     Seizures (HCC)     38 years ago       Past Surgical History:   Procedure Laterality Date    EGD  12/15/2022    done by Dima White MD    KNEE SURGERY      RI EXC TUMOR SOFT TISSUE THIGH/KNEE SUBFASC 5 CM/> Right 02/03/2020    Procedure: EXCISIONAL BIOPSY SOFT  TISSUE MASS RIGHT KNEE;  Surgeon: Sebastian Babin MD;  Location:  MAIN OR;  Service: Orthopedics    RI LAPAROSCOPY SURG CHOLECYSTECTOMY N/A 01/16/2023    Procedure: CHOLECYSTECTOMY LAP;  Surgeon: Octavio Martinez MD;  Location: [facility];  Service: General    TUBAL LIGATION         Family History   Problem Relation Age of Onset    Heart disease Mother         She has Copd    Hypertension Mother      I have reviewed and agree with the history as documented.    E-Cigarette/Vaping    E-Cigarette Use Never User      E-Cigarette/Vaping Substances    Nicotine No     THC No     CBD No     Flavoring No     Other No     Unknown No      Social History     Tobacco Use    Smoking status: Every Day     Current packs/day: 0.25     Average packs/day: 0.3 packs/day for 10.0 years (2.5 ttl pk-yrs)     Types: Cigarettes     Passive exposure: Current    Smokeless tobacco: Never   Vaping Use    Vaping status: Never Used   Substance Use Topics    Alcohol use: No    Drug use: No        Review of Systems   Constitutional:  Negative for chills, fatigue and fever.   HENT:  Negative for sore throat, tinnitus and voice change.    Respiratory:  Negative for cough, chest tightness, shortness of breath and wheezing.    Cardiovascular:  Negative for chest pain and palpitations.   Gastrointestinal:  Negative for abdominal pain, nausea and vomiting.   Genitourinary: Negative.    Musculoskeletal:  Positive for gait problem (Mild limp) and joint swelling (Mild effusion right side). Negative for back pain, neck pain and stiffness.   Skin:  Negative for itching.   Neurological:  Negative for dizziness,  numbness and headaches.       Physical Exam  ED Triage Vitals [08/15/24 0738]   Temperature Pulse Respirations Blood Pressure SpO2   98.4 °F (36.9 °C) 79 20 136/100 99 %      Temp Source Heart Rate Source Patient Position - Orthostatic VS BP Location FiO2 (%)   Oral Monitor Sitting Left arm --      Pain Score       --             Orthostatic Vital Signs  Vitals:    08/15/24 0738   BP: 136/100   Pulse: 79   Patient Position - Orthostatic VS: Sitting       Physical Exam  Constitutional:       General: She is not in acute distress.     Appearance: Normal appearance. She is normal weight. She is not ill-appearing, toxic-appearing or diaphoretic.   HENT:      Head: Normocephalic and atraumatic.      Right Ear: External ear normal.      Left Ear: External ear normal.      Nose: Nose normal.      Mouth/Throat:      Mouth: Mucous membranes are moist.   Eyes:      General:         Right eye: No discharge.         Left eye: No discharge.      Extraocular Movements: Extraocular movements intact.      Conjunctiva/sclera: Conjunctivae normal.      Pupils: Pupils are equal, round, and reactive to light.   Cardiovascular:      Rate and Rhythm: Normal rate and regular rhythm.      Pulses: Normal pulses.      Heart sounds: Normal heart sounds. No murmur heard.  Pulmonary:      Effort: Pulmonary effort is normal. No respiratory distress.      Breath sounds: Normal breath sounds.   Chest:      Chest wall: No tenderness.   Musculoskeletal:         General: Swelling (Mild left knee swelling), tenderness (Tenderness to palpation) and signs of injury present. No deformity. Normal range of motion.      Cervical back: Normal range of motion.      Left knee: Effusion (Mild) and erythema (Minimal) present. No swelling, ecchymosis, lacerations, bony tenderness or crepitus. Normal range of motion. Tenderness (Medial) present. No patellar tendon tenderness. Normal alignment and normal patellar mobility. Normal pulse.      Right lower leg: No  edema.      Left lower leg: No edema.   Skin:     General: Skin is warm.      Findings: No rash.   Neurological:      General: No focal deficit present.      Mental Status: She is alert and oriented to person, place, and time. Mental status is at baseline.   Psychiatric:         Mood and Affect: Mood normal.         Behavior: Behavior normal.         Thought Content: Thought content normal.         Judgment: Judgment normal.       ED Medications  Medications   Diclofenac Sodium (VOLTAREN) 1 % topical gel 2 g (has no administration in time range)   ibuprofen (MOTRIN) tablet 600 mg (600 mg Oral Given 8/15/24 0828)   acetaminophen (TYLENOL) tablet 975 mg (975 mg Oral Given 8/15/24 0828)       Diagnostic Studies  Results Reviewed       None                   No orders to display         Procedures  Procedures      ED Course                             SBIRT 22yo+      Flowsheet Row Most Recent Value   Initial Alcohol Screen: US AUDIT-C     1. How often do you have a drink containing alcohol? 0 Filed at: 08/15/2024 0742   2. How many drinks containing alcohol do you have on a typical day you are drinking?  0 Filed at: 08/15/2024 0742   3b. FEMALE Any Age, or MALE 65+: How often do you have 4 or more drinks on one occassion? 0 Filed at: 08/15/2024 0742   Audit-C Score 0 Filed at: 08/15/2024 0742   CATALINA: How many times in the past year have you...    Used an illegal drug or used a prescription medication for non-medical reasons? Never Filed at: 08/15/2024 0742                  Medical Decision Making  The patient's presentation is consistent with either meniscal or ligamentous. No concerns for acute fracture or broken bone, X ray not warranted. No concern for septic arthritis. No numbness, no sensation loss, no concerns for quadriceps tear, no abnormal location of patella, no abnormal neovasculature, no baker's cyst, and no warmth or signs of infection. Patient was given referral for Ortho for further management. Patient  was provided supportive care with pain control, crutches, and knee immobilizer.  ED return precautions were advised.     Risk  OTC drugs.  Prescription drug management.          Disposition  Final diagnoses:   Acute pain of left knee     Time reflects when diagnosis was documented in both MDM as applicable and the Disposition within this note       Time User Action Codes Description Comment    8/15/2024  8:29 AM Jurgen Gonzales [M25.562] Acute pain of left knee           ED Disposition       ED Disposition   Discharge    Condition   Stable    Date/Time   Thu Aug 15, 2024 0819    Comment   Mukundmariano Danial discharge to home/self care.                   Follow-up Information       Follow up With Specialties Details Why Contact Info Additional Information    [facility] [place] [facility] Schedule an appointment as soon as possible for a visit   [address] [facility] [address] [phone] [facility] [place], [address] [place] [address], [address] [phone]  Use Entrance A             Patient's Medications   Discharge Prescriptions    DICLOFENAC SODIUM (VOLTAREN) 1 %    Apply 2 g topically 4 (four) times a day       Start Date: 8/15/2024 End Date: --       Order Dose: 2 g       Quantity: 100 g    Refills: 0    IBUPROFEN (MOTRIN) 600 MG TABLET    Take 1 tablet (600 mg total) by mouth every 6 (six) hours as needed for mild pain       Start Date: 8/15/2024 End Date: --       Order Dose: 600 mg       Quantity: 30 tablet    Refills: 0         PDMP Review         Value Time User    PDMP Reviewed  Yes 2/4/2020 10:31 AM Sebastian Babin MD             ED Provider  Attending physically available and evaluated Mukundmariano HayDanial. I managed the patient along with the ED Attending.    Electronically Signed by           Chay Montenegro MD  08/15/24 0982

## 2024-10-14 ENCOUNTER — TELEPHONE (OUTPATIENT)
Dept: OBGYN CLINIC | Facility: CLINIC | Age: 44
End: 2024-10-14

## 2024-11-05 ENCOUNTER — TELEPHONE (OUTPATIENT)
Dept: FAMILY MEDICINE CLINIC | Facility: CLINIC | Age: 44
End: 2024-11-05

## 2024-11-05 DIAGNOSIS — Z11.1 SCREENING-PULMONARY TB: Primary | ICD-10-CM

## 2024-11-05 NOTE — TELEPHONE ENCOUNTER
[facility] Appraisal  form received on 11/5/24  to be completed by PCP. Copy made and placed in PCP folder.    Forms to be delivered to PCP mailbox at assigned time.

## 2024-11-05 NOTE — TELEPHONE ENCOUNTER
Good morning Hubert,    Patient Kira came into office requesting if an order for a TB Test could be placed so she can get it done in the lab.    Please advise?

## 2024-11-07 NOTE — TELEPHONE ENCOUNTER
Patient was attempted to be contacted multiple times in regards to needing to get TB lab done before forms can be completed. Number is not working and emergency contact number is not a valid number for the correct person.    Please inform patient if she contacts office or stops into office to get lab done.

## 2024-11-10 ENCOUNTER — HOSPITAL ENCOUNTER (EMERGENCY)
Facility: HOSPITAL | Age: 44
Discharge: HOME | End: 2024-11-10
Attending: EMERGENCY MEDICINE
Payer: MEDICARE

## 2024-11-10 VITALS
TEMPERATURE: 97.8 F | OXYGEN SATURATION: 99 % | DIASTOLIC BLOOD PRESSURE: 71 MMHG | SYSTOLIC BLOOD PRESSURE: 109 MMHG | WEIGHT: 159.61 LBS | HEART RATE: 77 BPM | RESPIRATION RATE: 16 BRPM | BODY MASS INDEX: 22.26 KG/M2

## 2024-11-10 DIAGNOSIS — F41.0 PANIC ATTACK: Primary | ICD-10-CM

## 2024-11-10 LAB
ANION GAP SERPL CALCULATED.3IONS-SCNC: 6 MMOL/L (ref 4–13)
ATRIAL RATE: 64 BPM
BASOPHILS # BLD AUTO: 0.02 THOUSANDS/ÂΜL (ref 0–0.1)
BASOPHILS NFR BLD AUTO: 0 % (ref 0–1)
BUN SERPL-MCNC: 10 MG/DL (ref 5–25)
CALCIUM SERPL-MCNC: 9.2 MG/DL (ref 8.4–10.2)
CARDIAC TROPONIN I PNL SERPL HS: <2 NG/L
CHLORIDE SERPL-SCNC: 106 MMOL/L (ref 96–108)
CO2 SERPL-SCNC: 25 MMOL/L (ref 21–32)
CREAT SERPL-MCNC: 0.74 MG/DL (ref 0.6–1.3)
EOSINOPHIL # BLD AUTO: 0.13 THOUSAND/ÂΜL (ref 0–0.61)
EOSINOPHIL NFR BLD AUTO: 2 % (ref 0–6)
ERYTHROCYTE [DISTWIDTH] IN BLOOD BY AUTOMATED COUNT: 14.2 % (ref 11.6–15.1)
GFR SERPL CREATININE-BSD FRML MDRD: 98 ML/MIN/1.73SQ M
GLUCOSE SERPL-MCNC: 82 MG/DL (ref 65–140)
HCT VFR BLD AUTO: 40.4 % (ref 34.8–46.1)
HGB BLD-MCNC: 12.6 G/DL (ref 11.5–15.4)
IMM GRANULOCYTES # BLD AUTO: 0.01 THOUSAND/UL (ref 0–0.2)
IMM GRANULOCYTES NFR BLD AUTO: 0 % (ref 0–2)
LYMPHOCYTES # BLD AUTO: 2.08 THOUSANDS/ÂΜL (ref 0.6–4.47)
LYMPHOCYTES NFR BLD AUTO: 29 % (ref 14–44)
MCH RBC QN AUTO: 25 PG (ref 26.8–34.3)
MCHC RBC AUTO-ENTMCNC: 31.2 G/DL (ref 31.4–37.4)
MCV RBC AUTO: 80 FL (ref 82–98)
MONOCYTES # BLD AUTO: 0.42 THOUSAND/ÂΜL (ref 0.17–1.22)
MONOCYTES NFR BLD AUTO: 6 % (ref 4–12)
NEUTROPHILS # BLD AUTO: 4.41 THOUSANDS/ÂΜL (ref 1.85–7.62)
NEUTS SEG NFR BLD AUTO: 63 % (ref 43–75)
NRBC BLD AUTO-RTO: 0 /100 WBCS
P AXIS: 72 DEGREES
PLATELET # BLD AUTO: 230 THOUSANDS/UL (ref 149–390)
PMV BLD AUTO: 10.4 FL (ref 8.9–12.7)
POTASSIUM SERPL-SCNC: 3.7 MMOL/L (ref 3.5–5.3)
PR INTERVAL: 178 MS
QRS AXIS: 74 DEGREES
QRSD INTERVAL: 70 MS
QT INTERVAL: 402 MS
QTC INTERVAL: 414 MS
RBC # BLD AUTO: 5.04 MILLION/UL (ref 3.81–5.12)
SODIUM SERPL-SCNC: 137 MMOL/L (ref 135–147)
T WAVE AXIS: 65 DEGREES
VENTRICULAR RATE: 64 BPM
WBC # BLD AUTO: 7.07 THOUSAND/UL (ref 4.31–10.16)

## 2024-11-10 PROCEDURE — 93010 ELECTROCARDIOGRAM REPORT: CPT | Performed by: STUDENT IN AN ORGANIZED HEALTH CARE EDUCATION/TRAINING PROGRAM

## 2024-11-10 PROCEDURE — 80048 BASIC METABOLIC PNL TOTAL CA: CPT | Performed by: EMERGENCY MEDICINE

## 2024-11-10 PROCEDURE — 84484 ASSAY OF TROPONIN QUANT: CPT | Performed by: EMERGENCY MEDICINE

## 2024-11-10 PROCEDURE — 99285 EMERGENCY DEPT VISIT HI MDM: CPT | Performed by: EMERGENCY MEDICINE

## 2024-11-10 PROCEDURE — 36415 COLL VENOUS BLD VENIPUNCTURE: CPT | Performed by: EMERGENCY MEDICINE

## 2024-11-10 PROCEDURE — 85025 COMPLETE CBC W/AUTO DIFF WBC: CPT | Performed by: EMERGENCY MEDICINE

## 2024-11-10 PROCEDURE — 99284 EMERGENCY DEPT VISIT MOD MDM: CPT

## 2024-11-10 PROCEDURE — 93005 ELECTROCARDIOGRAM TRACING: CPT

## 2024-11-10 RX ORDER — HYDROXYZINE HYDROCHLORIDE 25 MG/1
25 TABLET, FILM COATED ORAL EVERY 6 HOURS PRN
Qty: 12 TABLET | Refills: 0 | Status: SHIPPED | OUTPATIENT
Start: 2024-11-10

## 2024-11-10 RX ORDER — LORAZEPAM 0.5 MG/1
0.5 TABLET ORAL ONCE
Status: COMPLETED | OUTPATIENT
Start: 2024-11-10 | End: 2024-11-10

## 2024-11-10 RX ADMIN — LORAZEPAM 0.5 MG: 0.5 TABLET ORAL at 13:25

## 2024-11-10 NOTE — ED PROVIDER NOTES
Time reflects when diagnosis was documented in both MDM as applicable and the Disposition within this note       Time User Action Codes Description Comment    11/10/2024  2:18 PM Cindy Garcia Add [F41.0] Panic attack           ED Disposition       ED Disposition   Discharge    Condition   Good    Date/Time   Sun Nov 10, 2024  2:18 PM    Comment   Kira Barrow discharge to home/self care.                   Assessment & Plan       Medical Decision Making  44-year-old female presents to the ED with chest pressure, shortness of breath, shaking and crying.  This occurred while at work.  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  She states there was no inciting event and nothing going on in her life right now that would cause her to have a panic attack.  She never had a panic attack in the past.  On exam now she is alert and does still seem slightly anxious.  Her physical exam is normal.  Low clinical for ACS but given age will do cardiac workup.  I offered patient low-dose Ativan for her symptoms which she accepted.    Amount and/or Complexity of Data Reviewed  Labs: ordered. Decision-making details documented in ED Course.  ECG/medicine tests: ordered and independent interpretation performed.     Details: Normal sinus rhythm rate 64.  No ectopy.  Normal conduction.  Normal ST-T waves.  No ischemia.  No STEMI.    Risk  Prescription drug management.        ED Course as of 11/10/24 1419   Sun Nov 10, 2024   1258 Blood Pressure: 125/87   1258 Temperature: 97.8 °F (36.6 °C)   1258 Pulse: 72   1258 Respirations: 20   1258 SpO2: 100 %   1416 hs TnI 0hr: <2   1416 WBC: 7.07   1416 Hemoglobin: 12.6   1416 Sodium: 137   1416 Potassium: 3.7       Medications   LORazepam (ATIVAN) tablet 0.5 mg (0.5 mg Oral Given 11/10/24 1325)       ED Risk Strat Scores                           SBIRT 20yo+      Flowsheet Row Most Recent Value   Initial Alcohol Screen: US AUDIT-C     1. How often do you have a drink  containing alcohol? 0 Filed at: 11/10/2024 1252   2. How many drinks containing alcohol do you have on a typical day you are drinking?  0 Filed at: 11/10/2024 1252   3b. FEMALE Any Age, or MALE 65+: How often do you have 4 or more drinks on one occassion? 0 Filed at: 11/10/2024 1252   Audit-C Score 0 Filed at: 11/10/2024 1252   CATALINA: How many times in the past year have you...    Used an illegal drug or used a prescription medication for non-medical reasons? Never Filed at: 11/10/2024 1252                            History of Present Illness       Chief Complaint   Patient presents with    Shortness of Breath     Pt was at work and felt like she could catch her breath. Pt states she was shaking uncontrollably and is not sure what's happening          Past Medical History:   Diagnosis Date    Anemia     iron def    Arthritis     Chronic pain disorder     back    Migraines     Pneumonia 2018    Seasonal allergies     Seizure (HCC)     Seizures (HCC)     38 years ago      Past Surgical History:   Procedure Laterality Date    EGD  12/15/2022    done by Dima White MD    KNEE SURGERY      KS EXC TUMOR SOFT TISSUE THIGH/KNEE SUBFASC 5 CM/> Right 02/03/2020    Procedure: EXCISIONAL BIOPSY SOFT  TISSUE MASS RIGHT KNEE;  Surgeon: Sebastian Babin MD;  Location:  MAIN OR;  Service: Orthopedics    KS LAPAROSCOPY SURG CHOLECYSTECTOMY N/A 01/16/2023    Procedure: CHOLECYSTECTOMY LAP;  Surgeon: Octavio Martinez MD;  Location: [facility];  Service: General    TUBAL LIGATION        Family History   Problem Relation Age of Onset    Heart disease Mother         She has Copd    Hypertension Mother       Social History     Tobacco Use    Smoking status: Every Day     Current packs/day: 0.25     Average packs/day: 0.3 packs/day for 10.0 years (2.5 ttl pk-yrs)     Types: Cigarettes     Passive exposure: Current    Smokeless tobacco: Never   Vaping Use    Vaping status: Never Used   Substance Use Topics    Alcohol use: No    Drug  use: No      E-Cigarette/Vaping    E-Cigarette Use Never User       E-Cigarette/Vaping Substances    Nicotine No     THC No     CBD No     Flavoring No     Other No     Unknown No       I have reviewed and agree with the history as documented.     44-year-old female with history of seizures, chronic pain disorder presents to the emergency department with possible panic attack.  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  She also had chest pressure.  She states she was shaking all over and then started to cry.  She states this never had happened to her before.  She denies any anxiety producing event going on in her life right now.      History provided by:  Patient   used: No    Shortness of Breath  Onset quality:  Sudden  Duration:  1 hour  Timing:  Constant  Progression:  Partially resolved  Chronicity:  New  Context: not emotional upset    Context comment:  Cleaning rooms  Relieved by:  None tried  Worsened by:  Nothing  Ineffective treatments:  None tried  Associated symptoms: chest pain    Associated symptoms: no abdominal pain, no claudication, no cough, no diaphoresis, no ear pain, no fever, no headaches, no hemoptysis, no neck pain, no PND, no rash, no sore throat, no sputum production, no syncope, no swollen glands, no vomiting and no wheezing    Risk factors: tobacco use    Risk factors: no recent alcohol use, no hx of PE/DVT, no obesity, no prolonged immobilization and no recent surgery        Review of Systems   Constitutional: Negative.  Negative for chills, diaphoresis, fatigue and fever.   HENT: Negative.  Negative for congestion, ear pain, rhinorrhea and sore throat.    Eyes: Negative.  Negative for discharge, redness and itching.   Respiratory:  Positive for shortness of breath. Negative for apnea, cough, hemoptysis, sputum production, chest tightness and wheezing.    Cardiovascular:  Positive for  chest pain. Negative for palpitations, claudication, leg swelling, syncope and PND.   Gastrointestinal: Negative.  Negative for abdominal pain and vomiting.   Endocrine: Negative.    Genitourinary: Negative.  Negative for flank pain, frequency and urgency.   Musculoskeletal: Negative.  Negative for back pain and neck pain.   Skin: Negative.  Negative for rash.   Allergic/Immunologic: Negative.    Neurological: Negative.  Negative for dizziness, syncope, weakness, light-headedness, numbness and headaches.   Hematological: Negative.    Psychiatric/Behavioral:  Negative for dysphoric mood and suicidal ideas. The patient is nervous/anxious.    All other systems reviewed and are negative.          Objective       ED Triage Vitals [11/10/24 1238]   Temperature Pulse Blood Pressure Respirations SpO2 Patient Position - Orthostatic VS   97.8 °F (36.6 °C) 72 125/87 20 100 % Sitting      Temp Source Heart Rate Source BP Location FiO2 (%) Pain Score    Oral Monitor Right arm -- --      Vitals      Date and Time Temp Pulse SpO2 Resp BP Pain Score FACES Pain Rating User   11/10/24 1330 -- 74 100 % 20 125/75 -- -- LB   11/10/24 1300 -- 72 92 % 39 122/80 -- -- LB   11/10/24 1238 97.8 °F (36.6 °C) 72 100 % 20 125/87 -- -- ML            Physical Exam  Vitals and nursing note reviewed.   Constitutional:       General: She is awake. She is not in acute distress.     Appearance: Normal appearance. She is well-developed and normal weight. She is not ill-appearing, toxic-appearing or diaphoretic.   HENT:      Head: Normocephalic and atraumatic.      Right Ear: External ear normal.      Left Ear: External ear normal.      Nose: Nose normal.   Eyes:      General: No scleral icterus.        Right eye: No discharge.         Left eye: No discharge.      Conjunctiva/sclera: Conjunctivae normal.   Neck:      Thyroid: No thyromegaly.      Vascular: No JVD.      Trachea: No tracheal deviation.   Cardiovascular:      Rate and Rhythm: Normal rate  and regular rhythm.      Heart sounds: Normal heart sounds. No murmur heard.     No friction rub. No gallop.   Pulmonary:      Effort: Pulmonary effort is normal. No respiratory distress.      Breath sounds: Normal breath sounds. No stridor. No wheezing, rhonchi or rales.   Chest:      Chest wall: No tenderness.   Abdominal:      General: Bowel sounds are normal. There is no distension.      Palpations: Abdomen is soft. There is no mass.      Tenderness: There is no abdominal tenderness.      Hernia: No hernia is present.   Musculoskeletal:      Right lower leg: No edema.      Left lower leg: No edema.   Skin:     General: Skin is warm and dry.      Coloration: Skin is not jaundiced or pale.      Findings: No bruising, erythema, lesion or rash.   Neurological:      General: No focal deficit present.      Mental Status: She is alert and oriented to person, place, and time.      Motor: No weakness or abnormal muscle tone.      Deep Tendon Reflexes: Reflexes are normal and symmetric.   Psychiatric:         Attention and Perception: Attention and perception normal.         Mood and Affect: Mood is anxious.         Speech: Speech normal.         Behavior: Behavior is cooperative.         Thought Content: Thought content normal. Thought content does not include suicidal ideation. Thought content does not include suicidal plan.         Results Reviewed       Procedure Component Value Units Date/Time    HS Troponin 0hr (reflex protocol) [[id number]]  (Normal) Collected: 11/10/24 1333    Lab Status: Final result Specimen: Blood from Arm, Left Updated: 11/10/24 1402     hs TnI 0hr <2 ng/L     Basic metabolic panel [[id number]] Collected: 11/10/24 1333    Lab Status: Final result Specimen: Blood from Arm, Left Updated: 11/10/24 1354     Sodium 137 mmol/L      Potassium 3.7 mmol/L      Chloride 106 mmol/L      CO2 25 mmol/L      ANION GAP 6 mmol/L      BUN 10 mg/dL      Creatinine 0.74 mg/dL      Glucose 82 mg/dL      Calcium 9.2  mg/dL      eGFR 98 ml/min/1.73sq m     Narrative:      National Kid[employer] Disease Foundation guidelines for Chronic Kidney Disease (CKD):     Stage 1 with normal or high GFR (GFR > 90 mL/min/1.73 square meters)    Stage 2 Mild CKD (GFR = 60-89 mL/min/1.73 square meters)    Stage 3A Moderate CKD (GFR = 45-59 mL/min/1.73 square meters)    Stage 3B Moderate CKD (GFR = 30-44 mL/min/1.73 square meters)    Stage 4 Severe CKD (GFR = 15-29 mL/min/1.73 square meters)    Stage 5 End Stage CKD (GFR <15 mL/min/1.73 square meters)  Note: GFR calculation is accurate only with a steady state creatinine    CBC and differential [[id number]]  (Abnormal) Collected: 11/10/24 1333    Lab Status: Final result Specimen: Blood from Arm, Left Updated: 11/10/24 1339     WBC 7.07 Thousand/uL      RBC 5.04 Million/uL      Hemoglobin 12.6 g/dL      Hematocrit 40.4 %      MCV 80 fL      MCH 25.0 pg      MCHC 31.2 g/dL      RDW 14.2 %      MPV 10.4 fL      Platelets 230 Thousands/uL      nRBC 0 /100 WBCs      Segmented % 63 %      Immature Grans % 0 %      Lymphocytes % 29 %      Monocytes % 6 %      Eosinophils Relative 2 %      Basophils Relative 0 %      Absolute Neutrophils 4.41 Thousands/µL      Absolute Immature Grans 0.01 Thousand/uL      Absolute Lymphocytes 2.08 Thousands/µL      Absolute Monocytes 0.42 Thousand/µL      Eosinophils Absolute 0.13 Thousand/µL      Basophils Absolute 0.02 Thousands/µL             No orders to display       ECG 12 Lead Documentation Only    Date/Time: 11/10/2024 1:05 PM    Performed by: Cindy Garcia DO  Authorized by: Cindy Garcia DO    Indications / Diagnosis:  Cp  ECG reviewed by me, the ED Provider: yes    Patient location:  ED  Interpretation:     Interpretation: normal    Rate:     ECG rate:  62    ECG rate assessment: normal    Rhythm:     Rhythm: sinus rhythm    Ectopy:     Ectopy: none    Conduction:     Conduction: normal    ST segments:     ST segments:  Normal  T waves:     T waves:  normal        ED Medication and Procedure Management   Prior to Admission Medications   Prescriptions Last Dose Informant Patient Reported? Taking?   Diclofenac Sodium (VOLTAREN) 1 %   No No   Sig: Apply 2 g topically 4 (four) times a day   Diclofenac Sodium (VOLTAREN) 1 %   No No   Sig: Apply 2 g topically 4 (four) times a day   albuterol (Ventolin HFA) 90 mcg/act inhaler   No No   Sig: Inhale 2 puffs every 6 (six) hours as needed for wheezing   cyclobenzaprine (FLEXERIL) 10 mg tablet   No No   Sig: Take 1 tablet (10 mg total) by mouth 2 (two) times a day as needed for muscle spasms   ibuprofen (MOTRIN) 600 mg tablet   No No   Sig: Take 1 tablet (600 mg total) by mouth every 6 (six) hours as needed for mild pain   lidocaine (Lidoderm) 5 %   No No   Sig: Apply 1 patch topically over 12 hours daily for 10 doses Remove & Discard patch within 12 hours or as directed by MD   norethindrone (Ortho Micronor) 0.35 MG tablet   No No   Sig: Take 1 tablet (0.35 mg total) by mouth daily      Facility-Administered Medications: None     Patient's Medications   Discharge Prescriptions    HYDROXYZINE HCL (ATARAX) 25 MG TABLET    Take 1 tablet (25 mg total) by mouth every 6 (six) hours as needed for anxiety       Start Date: 11/10/2024End Date: --       Order Dose: 25 mg       Quantity: 12 tablet    Refills: 0     No discharge procedures on file.  ED SEPSIS DOCUMENTATION   Time reflects when diagnosis was documented in both MDM as applicable and the Disposition within this note       Time User Action Codes Description Comment    11/10/2024  2:18 PM Cindy Garcia Add [F41.0] Panic attack                  Cindy Garcia, DO  11/10/24 1305       Cindy Garcia, DO  11/10/24 6506

## 2024-11-10 NOTE — Clinical Note
Kira Barrow was seen and treated in our emergency department on 11/10/2024.    No restrictions            Diagnosis:     Kira  may return to work on return date.    She may return on this date: 11/11/2024         If you have any questions or concerns, please don't hesitate to call.      Cindy Garcia, DO    ______________________________           _______________          _______________  Hospital Representative                              Date                                Time

## 2024-11-12 ENCOUNTER — ANNUAL EXAM (OUTPATIENT)
Dept: OBGYN CLINIC | Facility: CLINIC | Age: 44
End: 2024-11-12

## 2024-11-12 ENCOUNTER — APPOINTMENT (OUTPATIENT)
Dept: LAB | Facility: CLINIC | Age: 44
End: 2024-11-12
Payer: MEDICARE

## 2024-11-12 VITALS
HEIGHT: 71 IN | HEART RATE: 77 BPM | WEIGHT: 158 LBS | SYSTOLIC BLOOD PRESSURE: 129 MMHG | BODY MASS INDEX: 22.12 KG/M2 | DIASTOLIC BLOOD PRESSURE: 86 MMHG

## 2024-11-12 DIAGNOSIS — Z11.1 SCREENING-PULMONARY TB: ICD-10-CM

## 2024-11-12 DIAGNOSIS — Z01.419 ENCOUNTER FOR ANNUAL ROUTINE GYNECOLOGICAL EXAMINATION: Primary | ICD-10-CM

## 2024-11-12 PROCEDURE — 86480 TB TEST CELL IMMUN MEASURE: CPT

## 2024-11-12 PROCEDURE — 36415 COLL VENOUS BLD VENIPUNCTURE: CPT

## 2024-11-12 PROCEDURE — 99396 PREV VISIT EST AGE 40-64: CPT | Performed by: NURSE PRACTITIONER

## 2024-11-12 NOTE — PROGRESS NOTES
Annual Exam    Assessment   1. Encounter for annual routine gynecological examination          well woman       Plan       All questions answered.  Breast self exam technique reviewed and patient encouraged to perform self-exam monthly.  Contraception: tubal ligation.  Diagnosis explained in detail, including differential.  Discussed healthy lifestyle modifications.  Follow up in 1 year.     Patient Instructions   Call with needs or concerns  Annual GYN exam in 1 year  Pt verbalized understanding of all discussed.      Subjective      Kira Barrow is a 44 y.o.  female who presents for annual well woman exam. Periods are regular every 28-30 days, lasting 5 days. No intermenstrual bleeding, spotting, or discharge.  2020 Last WNL PAP and negative HPV  No mammogram on file, pt states mammogram was recently done at [place], pt states she will check for results with Family Practice since they ordered the mammogram  Partner, female x 10 years, denies domestic violence    Depression Screening Follow-up Plan: Patient's depression screening was negative with a PHQ-2 score of 0. Their PHQ-9 score was . Clinically patient does not have depression. No treatment is required.      Current contraception: tubal ligation  History of abnormal Pap smear: no  Family history of uterine or ovarian cancer: no  Regular self breast exam: yes  History of abnormal mammogram: no  Family history of breast cancer: no  History of abnormal lipids: yes - pt states diet controlled  Menstrual History:  OB History          8    Para   7    Term   7       0    AB   1    Living   7         SAB   0    IAB   0    Ectopic   0    Multiple   0    Live Births   7                Menarche age: 14  Patient's last menstrual period was 10/28/2024 (approximate).       The following portions of the patient's history were reviewed and updated as appropriate: allergies, current medications, past family history, past medical  "history, past social history, past surgical history and problem list.    Review of Systems  Pertinent items are noted in HPI.      Objective      /86 (BP Location: Right arm, Patient Position: Sitting, Cuff Size: Standard)   Pulse 77   Ht 5' 11\" (1.803 m)   Wt 71.7 kg (158 lb)   LMP 10/28/2024 (Approximate)   BMI 22.04 kg/m²     General: alert and oriented, in no acute distress, alert, appears stated age, and cooperative   Heart: NSR   Lungs: clear to auscultation bilaterally, WNL respiratory effort, negative cough or SOB   Thyroid: Negative masses palpable   Abdomen: soft, non-tender, without masses or organomegaly palpable   Vulva: normal   Vagina: normal mucosa   Cervix: no cervical motion tenderness and no lesions   Uterus: normal size, non-tender, normal shape and consistency   Adnexa: normal adnexa   Urethra: normal   Breasts: NT,negative masses palpable, negative discharge, or dimpling             "

## 2024-11-13 LAB
GAMMA INTERFERON BACKGROUND BLD IA-ACNC: 0.02 IU/ML
M TB IFN-G BLD-IMP: POSITIVE
M TB IFN-G CD4+ BCKGRND COR BLD-ACNC: -0.01 IU/ML
M TB IFN-G CD4+ BCKGRND COR BLD-ACNC: 5.69 IU/ML
MITOGEN IGNF BCKGRD COR BLD-ACNC: 9.98 IU/ML

## 2024-11-14 ENCOUNTER — HOSPITAL ENCOUNTER (OUTPATIENT)
Dept: RADIOLOGY | Facility: HOSPITAL | Age: 44
End: 2024-11-14
Payer: MEDICARE

## 2024-11-14 ENCOUNTER — OFFICE VISIT (OUTPATIENT)
Dept: FAMILY MEDICINE CLINIC | Facility: CLINIC | Age: 44
End: 2024-11-14

## 2024-11-14 VITALS
SYSTOLIC BLOOD PRESSURE: 100 MMHG | HEIGHT: 71 IN | WEIGHT: 159 LBS | OXYGEN SATURATION: 96 % | DIASTOLIC BLOOD PRESSURE: 70 MMHG | BODY MASS INDEX: 22.26 KG/M2 | TEMPERATURE: 98 F | HEART RATE: 87 BPM | RESPIRATION RATE: 16 BRPM

## 2024-11-14 DIAGNOSIS — R76.12 POSITIVE QUANTIFERON-TB GOLD TEST: ICD-10-CM

## 2024-11-14 DIAGNOSIS — R05.9 COUGH, UNSPECIFIED TYPE: ICD-10-CM

## 2024-11-14 DIAGNOSIS — R07.89 CHEST TIGHTNESS: ICD-10-CM

## 2024-11-14 DIAGNOSIS — R76.12 POSITIVE QUANTIFERON-TB GOLD TEST: Primary | ICD-10-CM

## 2024-11-14 PROCEDURE — 71046 X-RAY EXAM CHEST 2 VIEWS: CPT

## 2024-11-14 PROCEDURE — 99213 OFFICE O/P EST LOW 20 MIN: CPT | Performed by: NURSE PRACTITIONER

## 2024-11-14 RX ORDER — BENZONATATE 200 MG/1
200 CAPSULE ORAL 3 TIMES DAILY PRN
Qty: 20 CAPSULE | Refills: 0 | Status: SHIPPED | OUTPATIENT
Start: 2024-11-14

## 2024-11-14 RX ORDER — ALBUTEROL SULFATE 90 UG/1
2 INHALANT RESPIRATORY (INHALATION) EVERY 6 HOURS PRN
Qty: 18 G | Refills: 5 | Status: SHIPPED | OUTPATIENT
Start: 2024-11-14

## 2024-11-14 RX ORDER — GUAIFENESIN 200 MG/10ML
200 LIQUID ORAL 3 TIMES DAILY PRN
Qty: 120 ML | Refills: 0 | Status: SHIPPED | OUTPATIENT
Start: 2024-11-14

## 2024-11-14 NOTE — PROGRESS NOTES
Name: Kira Barrow      : 1980      MRN: [id number]  Encounter Provider: KIM Garcia  Encounter Date: 2024   Encounter department: [facility]  :  Assessment & Plan  Positive QuantiFERON-TB Gold test  Possible exposure to positive family member   Currently with URI sx x 4 days   No night sweats, fever, fatigue    Orders:    Ambulatory Referral to Infectious Disease; Future    XR chest pa and lateral; Future    Chest tightness    Orders:    albuterol (Ventolin HFA) 90 mcg/act inhaler; Inhale 2 puffs every 6 (six) hours as needed for wheezing    Cough, unspecified type    Orders:    benzonatate (TESSALON) 200 MG capsule; Take 1 capsule (200 mg total) by mouth 3 (three) times a day as needed for cough    guaiFENesin (ROBITUSSIN) 100 MG/5ML oral liquid; Take 10 mL (200 mg total) by mouth 3 (three) times a day as needed for cough          Depression Screening and Follow-up Plan: Patient was screened for depression during today's encounter. They screened negative with a PHQ-9 score of 1.      History of Present Illness     Patient is a 43 YO female who  has a past medical history of Anemia, Arthritis, Chronic pain disorder, Migraines, Pneumonia (2018), Seasonal allergies, Seizure (HCC), and Seizures (HCC) who presents today for follow up.     She reports that she had a possible exposure to TB. She was sent for a Quantiferon Gold serum test which came back positive today. She reports that she has had cough and congestion x 4 days. She denies fever, hemoptysis, night sweats.     The following portions of the patient's history were reviewed and updated as appropriate: allergies, current medications, past family history, past medical history, past social history, past surgical history and problem list.        Review of Systems   Constitutional:  Negative for chills and fever.   HENT:  Positive for congestion. Negative for ear pain and sore throat.   "  Eyes:  Negative for pain and visual disturbance.   Respiratory:  Positive for cough. Negative for shortness of breath.    Cardiovascular:  Negative for chest pain and palpitations.   Gastrointestinal:  Negative for abdominal pain and vomiting.   Genitourinary:  Negative for dysuria and hematuria.   Musculoskeletal:  Negative for arthralgias and back pain.   Skin:  Negative for color change and rash.   Neurological:  Negative for seizures and syncope.   All other systems reviewed and are negative.         Objective   /70 (BP Location: Right arm, Patient Position: Sitting, Cuff Size: Standard)   Pulse 87   Temp 98 °F (36.7 °C) (Temporal)   Resp 16   Ht 5' 11\" (1.803 m)   Wt 72.1 kg (159 lb)   LMP 10/28/2024 (Approximate)   SpO2 96%   BMI 22.18 kg/m²      Physical Exam  Vitals and nursing note reviewed.   Constitutional:       General: She is not in acute distress.     Appearance: She is well-developed.   HENT:      Head: Normocephalic and atraumatic.      Nose: Congestion present.   Eyes:      Conjunctiva/sclera: Conjunctivae normal.   Cardiovascular:      Rate and Rhythm: Normal rate and regular rhythm.   Pulmonary:      Effort: Pulmonary effort is normal. No respiratory distress.      Breath sounds: Decreased breath sounds present.   Abdominal:      Palpations: Abdomen is soft.      Tenderness: There is no abdominal tenderness.   Musculoskeletal:         General: No swelling.      Cervical back: Neck supple.   Lymphadenopathy:      Cervical: Cervical adenopathy present.   Skin:     General: Skin is warm and dry.      Capillary Refill: Capillary refill takes less than 2 seconds.   Neurological:      Mental Status: She is alert.   Psychiatric:         Mood and Affect: Mood normal.         "

## 2024-11-14 NOTE — ASSESSMENT & PLAN NOTE
Orders:    albuterol (Ventolin HFA) 90 mcg/act inhaler; Inhale 2 puffs every 6 (six) hours as needed for wheezing

## 2024-11-15 ENCOUNTER — RESULTS FOLLOW-UP (OUTPATIENT)
Dept: FAMILY MEDICINE CLINIC | Facility: CLINIC | Age: 44
End: 2024-11-15

## 2024-11-15 ENCOUNTER — TELEPHONE (OUTPATIENT)
Dept: FAMILY MEDICINE CLINIC | Facility: CLINIC | Age: 44
End: 2024-11-15

## 2024-11-15 NOTE — TELEPHONE ENCOUNTER
Pt ha been informed and she is still awaiting ID to reach out. I also provided the pt with the phone number to contact them.     Pt is asking is she able to return back to work in the mean time?

## 2024-11-15 NOTE — TELEPHONE ENCOUNTER
Pt would like to have results explained to her due to seeing TB test results being positive and chest stating something else. Please contact pt ASAP.

## 2024-11-18 ENCOUNTER — NURSE TRIAGE (OUTPATIENT)
Age: 44
End: 2024-11-18

## 2024-11-18 NOTE — TELEPHONE ENCOUNTER
Regarding: +TB GOLD  ----- Message from Isabel TALAMANTES sent at 11/18/2024  9:14 AM EST -----  ID Triage- Positive QuantiFERON-TB Gold test per referral. Patient states she called many times to schedule last week. She states she was told CXR normal and called to schedule and discuss next steps. Please advise.

## 2024-11-18 NOTE — TELEPHONE ENCOUNTER
Patient called and and she is stating she called the number and left a message and no one has returned her phone josef, she is also wondering if the results can be faxed to her employer and when can she return back to work.      pLease advise.l.

## 2024-11-18 NOTE — TELEPHONE ENCOUNTER
Pt referral is in work queue and being triaged by another RN. Pt will be contacted once triage is complete.

## 2024-11-18 NOTE — TELEPHONE ENCOUNTER
Left message for patient to call back. Per Beatriz, practice coordinator, she stated we are allowed to write a letter for patient to return to work. If patient calls, please assist with letter.

## 2024-11-27 ENCOUNTER — CONSULT (OUTPATIENT)
Dept: INFECTIOUS DISEASES | Facility: CLINIC | Age: 44
End: 2024-11-27
Payer: MEDICARE

## 2024-11-27 VITALS
SYSTOLIC BLOOD PRESSURE: 108 MMHG | HEART RATE: 67 BPM | RESPIRATION RATE: 17 BRPM | OXYGEN SATURATION: 99 % | HEIGHT: 71 IN | DIASTOLIC BLOOD PRESSURE: 60 MMHG | TEMPERATURE: 95.4 F | WEIGHT: 166 LBS | BODY MASS INDEX: 23.24 KG/M2

## 2024-11-27 DIAGNOSIS — R76.12 POSITIVE QUANTIFERON-TB GOLD TEST: Primary | ICD-10-CM

## 2024-11-27 DIAGNOSIS — F41.0 PANIC ATTACK: ICD-10-CM

## 2024-11-27 PROCEDURE — 99214 OFFICE O/P EST MOD 30 MIN: CPT | Performed by: PHYSICIAN ASSISTANT

## 2024-11-27 RX ORDER — RIFAMPIN 300 MG/1
600 CAPSULE ORAL EVERY 12 HOURS SCHEDULED
Qty: 120 CAPSULE | Refills: 0 | Status: SHIPPED | OUTPATIENT
Start: 2024-11-27 | End: 2024-12-27

## 2024-11-27 NOTE — PROGRESS NOTES
Name: Kira Barrow      : 1980      MRN: [id number]  Encounter Provider: Christian Worley PA-C  Encounter Date: 2024   Encounter department: [facility]  :  Assessment & Plan  Positive QuantiFERON-TB Gold test  + Quantiferon Gold testing in the setting of recent exposure.  Patient wants to pursue treatment for latent Tb.  No current symptoms consistent with active TB.  CXR negative.      Discussed treatment options with patient and side effects of all including rifampin x 4 months, rifapentine/isoniazid/vitamin B6 weekly x 3 months and INH/vitamin B6 x 6-9 months.  She would like to start rifampin at this time.  We reviewed side effects including flu like symptoms, Has, nausea, orange secretions and liver injury.  Discussed avoiding tylenol and alcohol while on rifampin.  Patient states she has undergone tubal ligation in the past.  LFTs from July WNL.  CBC, BMP earlier this month stable.  Patient without any medication allergies.  She is currently on minimal medications.  Rifampin can decrease the efficacy of the ativan she takes prn for panic attacks.  Patient made aware of this interaction and would like to proceed.      Plan  - start rifampin 600 mg po daily x 4 months  - CBCD, CMP monthly  - patient counseled to avoid tylenol and alcohol while on rifampin  - RTO 4 weeks  - patient to call sooner with any questions.     Orders:    Ambulatory Referral to Infectious Disease    rifampin (RIFADIN) 300 mg capsule; Take 2 capsules (600 mg total) by mouth every 12 (twelve) hours    CBC and differential; Future    Comprehensive metabolic panel; Future    Panic attack  Patient reports fairly new onset of panic attacks.  On ativan prn.  Interaction between rifampin and ativan, rifampin will decrease the efficacy of the ativan.  Notified patient of this interaction.  She is aware and would like to proceed with the rifampin.             History of Present Illness   Reason  for Consult: + Qunatiferon Gold Testing   HPI: Kira Barrow is a 44 y.o. year old female presents for ID evaluation today regarding + Quantiferon Gold testing.  Patient states she was contacted that she was exposed to someone with TB.  She was then tested and her Quantiferon Gold returned positive.  She is very interested in pursuing treatment at this time.  She states her fiance has also recently tested positive and has began treatment.  She had a negative CXR.  She has no symptoms consistent with active TB.      ROS: A complete review of systems are negative other than that noted in the HPI   Denies fever/chills  Denies nightsweats/weight loss  Denies productive cough/hemoptysis      Past Medical History   Past Medical History:   Diagnosis Date    Anemia     iron def    Arthritis     Chronic pain disorder     back    Migraines     Pneumonia 2018    Seasonal allergies     Seizure (HCC)     Seizures (HCC)     38 years ago    Tendonitis     left shoulder     Past Surgical History:   Procedure Laterality Date    EGD  12/15/2022    done by Dima White MD    KNEE SURGERY      FL EXC TUMOR SOFT TISSUE THIGH/KNEE SUBFASC 5 CM/> Right 02/03/2020    Procedure: EXCISIONAL BIOPSY SOFT  TISSUE MASS RIGHT KNEE;  Surgeon: Sebastian Babin MD;  Location:  MAIN OR;  Service: Orthopedics    FL LAPAROSCOPY SURG CHOLECYSTECTOMY N/A 01/16/2023    Procedure: CHOLECYSTECTOMY LAP;  Surgeon: Octavio Martinez MD;  Location: [facility];  Service: General    TUBAL LIGATION       Family History   Problem Relation Age of Onset    Heart disease Mother         She has Copd    Hypertension Mother     Breast cancer Neg Hx     Colon cancer Neg Hx     Ovarian cancer Neg Hx     Cancer Neg Hx       reports that she has been smoking cigarettes. She has a 2.5 pack-year smoking history. She has been exposed to tobacco smoke. She has never used smokeless tobacco. She reports that she does not drink alcohol and does not use drugs.  Current  "Outpatient Medications on File Prior to Visit   Medication Sig Dispense Refill    albuterol (Ventolin HFA) 90 mcg/act inhaler Inhale 2 puffs every 6 (six) hours as needed for wheezing 18 g 5    benzonatate (TESSALON) 200 MG capsule Take 1 capsule (200 mg total) by mouth 3 (three) times a day as needed for cough 20 capsule 0    Ferrous Gluconate (IRON 27 PO) Take by mouth      guaiFENesin (ROBITUSSIN) 100 MG/5ML oral liquid Take 10 mL (200 mg total) by mouth 3 (three) times a day as needed for cough 120 mL 0    hydrOXYzine HCL (ATARAX) 25 mg tablet Take 1 tablet (25 mg total) by mouth every 6 (six) hours as needed for anxiety 12 tablet 0    ibuprofen (MOTRIN) 600 mg tablet Take 1 tablet (600 mg total) by mouth every 6 (six) hours as needed for mild pain 30 tablet 0    cyclobenzaprine (FLEXERIL) 10 mg tablet Take 1 tablet (10 mg total) by mouth 2 (two) times a day as needed for muscle spasms (Patient not taking: Reported on 11/27/2024) 40 tablet 0    Diclofenac Sodium (VOLTAREN) 1 % Apply 2 g topically 4 (four) times a day (Patient not taking: Reported on 11/27/2024) 100 g 2    Diclofenac Sodium (VOLTAREN) 1 % Apply 2 g topically 4 (four) times a day (Patient not taking: Reported on 11/27/2024) 100 g 0    lidocaine (Lidoderm) 5 % Apply 1 patch topically over 12 hours daily for 10 doses Remove & Discard patch within 12 hours or as directed by MD (Patient not taking: Reported on 11/27/2024) 30 patch 2    norethindrone (Ortho Micronor) 0.35 MG tablet Take 1 tablet (0.35 mg total) by mouth daily (Patient not taking: Reported on 11/12/2024) 90 tablet 1     No current facility-administered medications on file prior to visit.     Allergies   Allergen Reactions    Pollen Extract Allergic Rhinitis               Objective   /60   Pulse 67   Temp (!) 95.4 °F (35.2 °C)   Resp 17   Ht 5' 11\" (1.803 m)   Wt 75.3 kg (166 lb)   LMP 10/28/2024 (Approximate)   SpO2 99%   BMI 23.15 kg/m²      General: Alert, interactive, " "appearing well, nontoxic, no acute distress.  HEENT: NCAT, no conjunctivitis  Lungs: Clear to auscultation bilaterally; no audible wheezes, rhonchi or rales; respirations unlabored  Heart: RRR; no murmur, rub or gallop  Abdomen: Soft, non-tender, non-distended, positive bowel sounds.    Extremities: No clubbing, cyanosis or edema  Skin: No new rashes or lesions. No new draining wounds.    Lab Results: I have personally reviewed pertinent labs.  Lab Results   Component Value Date    K 3.7 11/10/2024     11/10/2024    CO2 25 11/10/2024    BUN 10 11/10/2024    CREATININE 0.74 11/10/2024    GLUF 80 07/16/2024    CALCIUM 9.2 11/10/2024    AST 17 07/16/2024    ALT 11 07/16/2024    ALKPHOS 58 07/16/2024    EGFR 98 11/10/2024     Lab Results   Component Value Date    WBC 7.07 11/10/2024    HGB 12.6 11/10/2024    HCT 40.4 11/10/2024    MCV 80 (L) 11/10/2024     11/10/2024   No results found for: \"ESR\"      Quantiferon Gold 11/12/24  QFT Nil 0.02  QFT TB1 - NIL -0.01  QFT TB2 - NIL - 5.69  QFT Mitogen 9.98    XR CHEST PA AND LATERAL (11/14/24)     INDICATION: R76.12: Nonspecific reaction to cell mediated immunity measurement of gamma interferon antigen response without active tuberculosis.     COMPARISON: Chest radiograph 3/28/2019     FINDINGS:     Clear lungs. No pneumothorax or pleural effusion.     Normal cardiomediastinal silhouette.     Bones are unremarkable for age.     Normal upper abdomen.     IMPRESSION:     No acute cardiopulmonary disease.    Radiology Results Review: I have reviewed radiology reports from 11/14/24  including: chest xray.    Administrative Statements   I have spent a total time of 45 minutes in caring for this patient on the day of the visit/encounter including Diagnostic results, Prognosis, Risks and benefits of tx options, Instructions for management, Patient and family education, Importance of tx compliance, Risk factor reductions, Impressions, Counseling / Coordination of care, " Documenting in the medical record, Reviewing / ordering tests, medicine, procedures  , and Obtaining or reviewing history  .

## 2024-11-27 NOTE — PATIENT INSTRUCTIONS
Start rifampin 600  mg po daily x 4 months  Labs in 4 weeks  Avoid tylenol and alcohol while on rifampin  RTO 4 weeks  Please call with any questions

## 2024-12-27 ENCOUNTER — TELEPHONE (OUTPATIENT)
Dept: INFECTIOUS DISEASES | Facility: CLINIC | Age: 44
End: 2024-12-27

## 2024-12-27 NOTE — TELEPHONE ENCOUNTER
Contacted patient to provide a friendly reminder for her upcoming appointment on (12/30) at 10am, and to request she have her lab work completed prior to visit.    Patient states that she was unaware any lab work was needed. Patient states understanding, and that she would attempt to go tomorrow to have it done.

## 2024-12-28 ENCOUNTER — APPOINTMENT (OUTPATIENT)
Dept: LAB | Facility: HOSPITAL | Age: 44
End: 2024-12-28
Payer: MEDICARE

## 2024-12-28 DIAGNOSIS — R76.12 POSITIVE QUANTIFERON-TB GOLD TEST: ICD-10-CM

## 2024-12-28 LAB
ALBUMIN SERPL BCG-MCNC: 4.1 G/DL (ref 3.5–5)
ALP SERPL-CCNC: 52 U/L (ref 34–104)
ALT SERPL W P-5'-P-CCNC: 7 U/L (ref 7–52)
ANION GAP SERPL CALCULATED.3IONS-SCNC: 6 MMOL/L (ref 4–13)
AST SERPL W P-5'-P-CCNC: 15 U/L (ref 13–39)
BASOPHILS # BLD AUTO: 0.02 THOUSANDS/ÂΜL (ref 0–0.1)
BASOPHILS NFR BLD AUTO: 0 % (ref 0–1)
BILIRUB SERPL-MCNC: 0.57 MG/DL (ref 0.2–1)
BUN SERPL-MCNC: 10 MG/DL (ref 5–25)
CALCIUM SERPL-MCNC: 9.5 MG/DL (ref 8.4–10.2)
CHLORIDE SERPL-SCNC: 104 MMOL/L (ref 96–108)
CO2 SERPL-SCNC: 29 MMOL/L (ref 21–32)
CREAT SERPL-MCNC: 0.83 MG/DL (ref 0.6–1.3)
EOSINOPHIL # BLD AUTO: 0.2 THOUSAND/ÂΜL (ref 0–0.61)
EOSINOPHIL NFR BLD AUTO: 4 % (ref 0–6)
ERYTHROCYTE [DISTWIDTH] IN BLOOD BY AUTOMATED COUNT: 14.3 % (ref 11.6–15.1)
GFR SERPL CREATININE-BSD FRML MDRD: 86 ML/MIN/1.73SQ M
GLUCOSE P FAST SERPL-MCNC: 105 MG/DL (ref 65–99)
HCT VFR BLD AUTO: 42 % (ref 34.8–46.1)
HGB BLD-MCNC: 12.6 G/DL (ref 11.5–15.4)
IMM GRANULOCYTES # BLD AUTO: 0.02 THOUSAND/UL (ref 0–0.2)
IMM GRANULOCYTES NFR BLD AUTO: 0 % (ref 0–2)
LYMPHOCYTES # BLD AUTO: 1.92 THOUSANDS/ÂΜL (ref 0.6–4.47)
LYMPHOCYTES NFR BLD AUTO: 36 % (ref 14–44)
MCH RBC QN AUTO: 24.5 PG (ref 26.8–34.3)
MCHC RBC AUTO-ENTMCNC: 30 G/DL (ref 31.4–37.4)
MCV RBC AUTO: 82 FL (ref 82–98)
MONOCYTES # BLD AUTO: 0.41 THOUSAND/ÂΜL (ref 0.17–1.22)
MONOCYTES NFR BLD AUTO: 8 % (ref 4–12)
NEUTROPHILS # BLD AUTO: 2.73 THOUSANDS/ÂΜL (ref 1.85–7.62)
NEUTS SEG NFR BLD AUTO: 52 % (ref 43–75)
NRBC BLD AUTO-RTO: 0 /100 WBCS
PLATELET # BLD AUTO: 205 THOUSANDS/UL (ref 149–390)
PMV BLD AUTO: 11.5 FL (ref 8.9–12.7)
POTASSIUM SERPL-SCNC: 4.2 MMOL/L (ref 3.5–5.3)
PROT SERPL-MCNC: 7.4 G/DL (ref 6.4–8.4)
RBC # BLD AUTO: 5.15 MILLION/UL (ref 3.81–5.12)
SODIUM SERPL-SCNC: 139 MMOL/L (ref 135–147)
WBC # BLD AUTO: 5.3 THOUSAND/UL (ref 4.31–10.16)

## 2024-12-28 PROCEDURE — 36415 COLL VENOUS BLD VENIPUNCTURE: CPT

## 2024-12-28 PROCEDURE — 80053 COMPREHEN METABOLIC PANEL: CPT

## 2024-12-28 PROCEDURE — 85025 COMPLETE CBC W/AUTO DIFF WBC: CPT

## 2024-12-31 ENCOUNTER — TELEPHONE (OUTPATIENT)
Dept: INFECTIOUS DISEASES | Facility: CLINIC | Age: 44
End: 2024-12-31

## 2024-12-31 NOTE — TELEPHONE ENCOUNTER
-Called patient to discuss medication dosing per her last office visit. Ms. Barrow was instructed to take Rifampin 600mg daily at her last office visit. Patient filled prescription but as of 12/31/2024 has not started taking her prescribed Rifampin. Patient states her wife is also currently on treatment with Rifampin and she is experiencing GI distress and since her wife has been experiencing side effects of the medication she did not want to start treatment. Ms. Barrow and wife requested an appointment to discuss alternative treatments.

## 2024-12-31 NOTE — TELEPHONE ENCOUNTER
Called pt to reschedule missed appt from 12/30. Left a message asking pt to call us to get that rescheduled.

## 2025-01-06 NOTE — PROGRESS NOTES
Name: Kira Barrow      : 1980      MRN: [id number]  Encounter Provider: Carlos Palma MD  Encounter Date: 2025   Encounter department: [facility]  :  Assessment & Plan  Positive QuantiFERON-TB Gold test  Positive Quantiferon Gold testing on 24 in the setting of recent exposure; her partner was also positive. Suspect true latent TB infection. Patient wants to pursue treatment. She has no current symptoms consistent with active TB. CXR negative. Plan had been to start rifampin however she was hesitant as her partner developed abdominal cramps while on this medication. We discussed today about starting medication and monitoring symptoms vs. Trying INH plus vitamin B6. She would still like to try Rifampin.  LFTs normal on 24. Counseled on importance of avoiding medications which can injure liver including acetaminophen and alcohol.   -Start Rifampin 600 mg daily  -Plan for 4 months of treatment  -Check LFTs again in 2-3 weeks, script ordered today  -Follow up in ID office in 4 weeks  -Plan for once monthly LFT monitoring and ID follow up until therapy completed  -Counseled to call our office right away should she develop any side effects including abdominal pain, nausea/emesis, fatigue, rash, fever/chills      Antibiotics: Rifampin 600 mg daily    History of Present Illness     Patient was seen recently in ID clinic on  to discuss treatment for latent TB.  She had a positive QuantiFERON gold test which was performed as she had a known exposure to TB.  Her fiancé also recently tested positive and was initiated on treatment.  At last visit patient did not have any signs to suggest active TB and had a negative chest x-ray.  She was to start rifampin therapy however she has not yet started taking this medication.    She was concerned about starting therapy as her partner, who recently started Rifampin, developed some abdominal cramps. She otherwise has  been doing well. No new/current symptoms. Of note, she reports that she is not taking norethindrone for which was on her medication list. Had been this due to abnormal menstrual cycle though she reports was not effective.     ROS:  A complete review of systems is negative other than that noted above in the HPI.    Current Outpatient Medications on File Prior to Visit   Medication Sig Dispense Refill    albuterol (Ventolin HFA) 90 mcg/act inhaler Inhale 2 puffs every 6 (six) hours as needed for wheezing 18 g 5    benzonatate (TESSALON) 200 MG capsule Take 1 capsule (200 mg total) by mouth 3 (three) times a day as needed for cough (Patient not taking: Reported on 1/8/2025) 20 capsule 0    cyclobenzaprine (FLEXERIL) 10 mg tablet Take 1 tablet (10 mg total) by mouth 2 (two) times a day as needed for muscle spasms (Patient not taking: Reported on 11/12/2024) 40 tablet 0    Diclofenac Sodium (VOLTAREN) 1 % Apply 2 g topically 4 (four) times a day (Patient not taking: Reported on 11/12/2024) 100 g 2    Diclofenac Sodium (VOLTAREN) 1 % Apply 2 g topically 4 (four) times a day (Patient not taking: Reported on 11/12/2024) 100 g 0    Ferrous Gluconate (IRON 27 PO) Take by mouth (Patient not taking: Reported on 1/8/2025)      guaiFENesin (ROBITUSSIN) 100 MG/5ML oral liquid Take 10 mL (200 mg total) by mouth 3 (three) times a day as needed for cough (Patient not taking: Reported on 1/8/2025) 120 mL 0    hydrOXYzine HCL (ATARAX) 25 mg tablet Take 1 tablet (25 mg total) by mouth every 6 (six) hours as needed for anxiety (Patient not taking: Reported on 1/8/2025) 12 tablet 0    ibuprofen (MOTRIN) 600 mg tablet Take 1 tablet (600 mg total) by mouth every 6 (six) hours as needed for mild pain (Patient not taking: Reported on 1/8/2025) 30 tablet 0    lidocaine (Lidoderm) 5 % Apply 1 patch topically over 12 hours daily for 10 doses Remove & Discard patch within 12 hours or as directed by MD (Patient not taking: Reported on 1/8/2025) 30  "patch 2    [DISCONTINUED] norethindrone (Ortho Micronor) 0.35 MG tablet Take 1 tablet (0.35 mg total) by mouth daily (Patient not taking: Reported on 1/8/2025) 90 tablet 1     No current facility-administered medications on file prior to visit.      Social History     Tobacco Use    Smoking status: Every Day     Current packs/day: 0.25     Average packs/day: 0.3 packs/day for 10.0 years (2.5 ttl pk-yrs)     Types: Cigarettes     Passive exposure: Current    Smokeless tobacco: Never   Vaping Use    Vaping status: Never Used   Substance and Sexual Activity    Alcohol use: No    Drug use: No    Sexual activity: Yes     Partners: Female        Objective   There were no vitals taken for this visit.     General: Alert, interactive, appearing well, nontoxic, no acute distress.  Throat: Oropharynx moist without lesions.   Lungs: Clear to auscultation bilaterally; no audible wheezes, rhonchi or rales; respirations unlabored  Heart: RRR  Abdomen: Soft, non-tender  Extremities: No clubbing, cyanosis   Skin: No new rashes     Lab Results: I have personally reviewed pertinent labs.  Lab Results   Component Value Date    K 4.2 12/28/2024     12/28/2024    CO2 29 12/28/2024    BUN 10 12/28/2024    CREATININE 0.83 12/28/2024    GLUF 105 (H) 12/28/2024    CALCIUM 9.5 12/28/2024    AST 15 12/28/2024    ALT 7 12/28/2024    ALKPHOS 52 12/28/2024    EGFR 86 12/28/2024     Lab Results   Component Value Date    WBC 5.30 12/28/2024    HGB 12.6 12/28/2024    HCT 42.0 12/28/2024    MCV 82 12/28/2024     12/28/2024   No results found for: \"ESR\"    Radiology Results Review : No pertinent imaging studies reviewed.      "

## 2025-01-07 RX ORDER — PYRIDOXINE HCL (VITAMIN B6) 50 MG
50 TABLET ORAL DAILY
Qty: 30 TABLET | Refills: 1 | Status: CANCELLED | OUTPATIENT
Start: 2025-01-07

## 2025-01-07 RX ORDER — ISONIAZID 300 MG/1
300 TABLET ORAL DAILY
Qty: 30 TABLET | Refills: 1 | Status: CANCELLED | OUTPATIENT
Start: 2025-01-07 | End: 2025-02-06

## 2025-01-08 ENCOUNTER — OFFICE VISIT (OUTPATIENT)
Dept: INFECTIOUS DISEASES | Facility: CLINIC | Age: 45
End: 2025-01-08
Payer: MEDICARE

## 2025-01-08 VITALS
OXYGEN SATURATION: 98 % | TEMPERATURE: 96.6 F | BODY MASS INDEX: 23.24 KG/M2 | WEIGHT: 166 LBS | HEART RATE: 68 BPM | RESPIRATION RATE: 17 BRPM | HEIGHT: 71 IN

## 2025-01-08 DIAGNOSIS — R76.12 POSITIVE QUANTIFERON-TB GOLD TEST: Primary | ICD-10-CM

## 2025-01-08 PROCEDURE — 99213 OFFICE O/P EST LOW 20 MIN: CPT | Performed by: INTERNAL MEDICINE

## 2025-01-08 RX ORDER — RIFAMPIN 300 MG/1
600 CAPSULE ORAL DAILY
Qty: 60 CAPSULE | Refills: 1 | Status: SHIPPED | OUTPATIENT
Start: 2025-01-08 | End: 2025-02-07

## 2025-02-10 ENCOUNTER — TELEPHONE (OUTPATIENT)
Dept: INFECTIOUS DISEASES | Facility: CLINIC | Age: 45
End: 2025-02-10

## 2025-02-10 ENCOUNTER — TELEPHONE (OUTPATIENT)
Dept: FAMILY MEDICINE CLINIC | Facility: CLINIC | Age: 45
End: 2025-02-10

## 2025-02-10 DIAGNOSIS — R76.12 POSITIVE QUANTIFERON-TB GOLD TEST: Primary | ICD-10-CM

## 2025-02-10 NOTE — TELEPHONE ENCOUNTER
FAXED ON 02/10/25 TO [facility]  at [phone]. FAX CONFIRMATION RECEIVED.

## 2025-02-14 NOTE — TELEPHONE ENCOUNTER
Called and left message for patient SO French today.   Reminded  French of her upcoming appointment and to have labs done prior to appointment.   Informed French if there are any questions to call the office.

## 2025-03-01 ENCOUNTER — HOSPITAL ENCOUNTER (EMERGENCY)
Facility: HOSPITAL | Age: 45
Discharge: HOME | End: 2025-03-01
Attending: EMERGENCY MEDICINE
Payer: MEDICARE

## 2025-03-01 VITALS
SYSTOLIC BLOOD PRESSURE: 124 MMHG | DIASTOLIC BLOOD PRESSURE: 59 MMHG | TEMPERATURE: 97.8 F | HEART RATE: 70 BPM | WEIGHT: 156.97 LBS | BODY MASS INDEX: 21.89 KG/M2 | RESPIRATION RATE: 18 BRPM | OXYGEN SATURATION: 99 %

## 2025-03-01 DIAGNOSIS — G89.29 CHRONIC LEFT SHOULDER PAIN: Primary | ICD-10-CM

## 2025-03-01 DIAGNOSIS — M25.512 CHRONIC LEFT SHOULDER PAIN: Primary | ICD-10-CM

## 2025-03-01 PROCEDURE — 96372 THER/PROPH/DIAG INJ SC/IM: CPT

## 2025-03-01 PROCEDURE — 99284 EMERGENCY DEPT VISIT MOD MDM: CPT | Performed by: EMERGENCY MEDICINE

## 2025-03-01 PROCEDURE — 99283 EMERGENCY DEPT VISIT LOW MDM: CPT

## 2025-03-01 RX ORDER — KETOROLAC TROMETHAMINE 30 MG/ML
15 INJECTION, SOLUTION INTRAMUSCULAR; INTRAVENOUS ONCE
Status: COMPLETED | OUTPATIENT
Start: 2025-03-01 | End: 2025-03-01

## 2025-03-01 RX ORDER — ACETAMINOPHEN 500 MG
500 TABLET ORAL EVERY 6 HOURS PRN
Qty: 20 TABLET | Refills: 0 | Status: SHIPPED | OUTPATIENT
Start: 2025-03-01 | End: 2025-03-08

## 2025-03-01 RX ORDER — ACETAMINOPHEN 325 MG/1
975 TABLET ORAL ONCE
Status: COMPLETED | OUTPATIENT
Start: 2025-03-01 | End: 2025-03-01

## 2025-03-01 RX ORDER — LIDOCAINE 50 MG/G
1 PATCH TOPICAL ONCE
Status: DISCONTINUED | OUTPATIENT
Start: 2025-03-01 | End: 2025-03-01 | Stop reason: HOSPADM

## 2025-03-01 RX ORDER — IBUPROFEN 200 MG
400 TABLET ORAL EVERY 6 HOURS PRN
Qty: 20 TABLET | Refills: 0 | Status: SHIPPED | OUTPATIENT
Start: 2025-03-01

## 2025-03-01 RX ADMIN — ACETAMINOPHEN 975 MG: 325 TABLET, FILM COATED ORAL at 17:08

## 2025-03-01 RX ADMIN — LIDOCAINE 1 PATCH: 700 PATCH TOPICAL at 17:08

## 2025-03-01 RX ADMIN — KETOROLAC TROMETHAMINE 15 MG: 30 INJECTION, SOLUTION INTRAMUSCULAR; INTRAVENOUS at 17:08

## 2025-03-01 NOTE — Clinical Note
Kira Barrow was seen and treated in our emergency department on 3/1/2025.            limit lifting boxes over 10 pounds till cleared by ortho    Diagnosis: left shoulder pain    Kira  may return to work on return date.    She may return on this date: 03/05/2025         If you have any questions or concerns, please don't hesitate to call.      Marielena Mendoza MD    ______________________________           _______________          _______________  Hospital Representative                              Date                                Time

## 2025-03-01 NOTE — ED PROVIDER NOTES
Time reflects when diagnosis was documented in both MDM as applicable and the Disposition within this note       Time User Action Codes Description Comment    3/1/2025  4:46 PM Marielena Mendoza [M25.512,  G89.29] Chronic left shoulder pain           ED Disposition       ED Disposition   Discharge    Condition   Stable    Date/Time   Sat Mar 1, 2025  4:46 PM    Comment   Kira Barrow discharge to home/self care.                   Assessment & Plan       Medical Decision Making  Risk  OTC drugs.  Prescription drug management.        History Provided by patient    Differential considered: sprain, strain    Patient declined xray given no trauma. States she has a hx of rotator cuff injury. Patient offered symptomatic treatments, provided work note. Discharged with ortho follow up.    The patient was instructed to follow up as documented. Strict return precautions were discussed with the patient and the patient was instructed to return to the emergency department immediately if symptoms worsen. The patient/patient family member acknowledged and were in agreement with plan.            Medications   ketorolac (TORADOL) injection 15 mg (15 mg Intramuscular Given 3/1/25 1708)   acetaminophen (TYLENOL) tablet 975 mg (975 mg Oral Given 3/1/25 1708)       ED Risk Strat Scores                            SBIRT 22yo+      Flowsheet Row Most Recent Value   Initial Alcohol Screen: US AUDIT-C     1. How often do you have a drink containing alcohol? 0 Filed at: 03/01/2025 1629   2. How many drinks containing alcohol do you have on a typical day you are drinking?  0 Filed at: 03/01/2025 1629   3a. Male UNDER 65: How often do you have five or more drinks on one occasion? 0 Filed at: 03/01/2025 1629   3b. FEMALE Any Age, or MALE 65+: How often do you have 4 or more drinks on one occassion? 0 Filed at: 03/01/2025 1629   Audit-C Score 0 Filed at: 03/01/2025 1629   [place]: How many times in the past year have you...    Used an illegal  drug or used a prescription medication for non-medical reasons? Never Filed at: 03/01/2025 0029                            History of Present Illness       Chief Complaint   Patient presents with    Shoulder Pain     Left shoulder pain starting yesterday. Denies trauma/injury. No pain meds. Describes it as a burning sensation.      43 yo F hx of chronic pain disorder, left shoulder tendonitis, presents to ed for eval of left shoulder pain.  Where: left shoulder  Onset of pain occurred: years  Radiation: minimal  Neuro complaints: no  Duration: intermittent  Pain currently: 8  Pain meds taken or other relieving factors: no  Prior similar pain: yes    Works in a [employer], pulls [employer]    Prior surgery to affected extremity: no  Any sustained trauma: no    Thinners: no  Head Strike: no  LOC: no  Neck pain: no      Past Medical History:   Diagnosis Date    Anemia     iron def    Arthritis     Chronic pain disorder     back    Migraines     Pneumonia 2018    Seasonal allergies     Seizure (HCC)     Seizures (HCC)     38 years ago    Tendonitis     left shoulder      Past Surgical History:   Procedure Laterality Date    EGD  12/15/2022    done by Dima White MD    KNEE SURGERY      FL EXC TUMOR SOFT TISSUE THIGH/KNEE SUBFASC 5 CM/> Right 02/03/2020    Procedure: EXCISIONAL BIOPSY SOFT  TISSUE MASS RIGHT KNEE;  Surgeon: Sebastian Babin MD;  Location:  MAIN OR;  Service: Orthopedics    FL LAPAROSCOPY SURG CHOLECYSTECTOMY N/A 01/16/2023    Procedure: CHOLECYSTECTOMY LAP;  Surgeon: Octavio Martinez MD;  Location: [facility];  Service: General    TUBAL LIGATION        Family History   Problem Relation Age of Onset    Heart disease Mother         She has Copd    Hypertension Mother     Breast cancer Neg Hx     Colon cancer Neg Hx     Ovarian cancer Neg Hx     Cancer Neg Hx       Social History     Tobacco Use    Smoking status: Every Day     Current packs/day: 0.25     Average packs/day: 0.3 packs/day for 10.0 years  (2.5 ttl pk-yrs)     Types: Cigarettes     Passive exposure: Current    Smokeless tobacco: Never   Vaping Use    Vaping status: Never Used   Substance Use Topics    Alcohol use: No    Drug use: No      E-Cigarette/Vaping    E-Cigarette Use Never User       E-Cigarette/Vaping Substances    Nicotine No     THC No     CBD No     Flavoring No     Other No     Unknown No       I have reviewed and agree with the history as documented.     HPI    Review of Systems   Constitutional:  Negative for chills, fatigue and fever.   HENT:  Negative for sore throat.    Eyes:  Negative for redness and visual disturbance.   Respiratory:  Negative for cough and shortness of breath.    Cardiovascular:  Negative for chest pain.   Gastrointestinal:  Negative for abdominal pain, diarrhea and nausea.   Genitourinary:  Negative for difficulty urinating, dysuria and pelvic pain.   Musculoskeletal:  Positive for arthralgias. Negative for back pain.   Skin:  Negative for rash.   Neurological:  Negative for syncope, weakness and headaches.   All other systems reviewed and are negative.          Objective       ED Triage Vitals   Temperature Pulse Blood Pressure Respirations SpO2 Patient Position - Orthostatic VS   03/01/25 1601 03/01/25 1601 03/01/25 1601 03/01/25 1601 03/01/25 1601 --   97.8 °F (36.6 °C) 70 124/59 18 99 %       Temp src Heart Rate Source BP Location FiO2 (%) Pain Score    -- -- -- -- 03/01/25 1708        8      Vitals      Date and Time Temp Pulse SpO2 Resp BP Pain Score FACES Pain Rating User   03/01/25 1708 -- -- -- -- -- 8 -- DW   03/01/25 1601 97.8 °F (36.6 °C) 70 99 % 18 124/59 -- -- CARLOS            Physical Exam  Vitals and nursing note reviewed.   Constitutional:       General: She is not in acute distress.  HENT:      Head: Normocephalic and atraumatic.      Right Ear: External ear normal.      Left Ear: External ear normal.   Eyes:      Extraocular Movements: Extraocular movements intact.      Conjunctiva/sclera:  Conjunctivae normal.   Cardiovascular:      Rate and Rhythm: Normal rate and regular rhythm.      Heart sounds: Normal heart sounds.   Pulmonary:      Effort: Pulmonary effort is normal. No respiratory distress.      Breath sounds: Normal breath sounds.   Abdominal:      General: Abdomen is flat.      Tenderness: There is no abdominal tenderness.   Musculoskeletal:         General: Normal range of motion.      Cervical back: Normal range of motion.      Comments: On examination:  Patient has full ROM  No overlying skin changes, or signs of trauma  No laxity of the joint  Distally neurovascularly in tact  Compartments soft  No crepitus noted    Tenderness on palpation of Glenohumeral joint     Skin:     General: Skin is warm and dry.   Neurological:      Mental Status: She is alert and oriented to person, place, and time.      Cranial Nerves: No cranial nerve deficit.      Motor: No abnormal muscle tone.      Coordination: Coordination normal.         Results Reviewed       None            No orders to display       Procedures    ED Medication and Procedure Management   Prior to Admission Medications   Prescriptions Last Dose Informant Patient Reported? Taking?   Diclofenac Sodium (VOLTAREN) 1 %   No No   Sig: Apply 2 g topically 4 (four) times a day   Patient not taking: Reported on 11/12/2024   Diclofenac Sodium (VOLTAREN) 1 %   No No   Sig: Apply 2 g topically 4 (four) times a day   Patient not taking: Reported on 11/12/2024   Ferrous Gluconate (IRON 27 PO)   Yes No   Sig: Take by mouth   Patient not taking: Reported on 1/8/2025   albuterol (Ventolin HFA) 90 mcg/act inhaler   No No   Sig: Inhale 2 puffs every 6 (six) hours as needed for wheezing   benzonatate (TESSALON) 200 MG capsule   No No   Sig: Take 1 capsule (200 mg total) by mouth 3 (three) times a day as needed for cough   Patient not taking: Reported on 1/8/2025   cyclobenzaprine (FLEXERIL) 10 mg tablet   No No   Sig: Take 1 tablet (10 mg total) by mouth  2 (two) times a day as needed for muscle spasms   Patient not taking: Reported on 11/12/2024   guaiFENesin (ROBITUSSIN) 100 MG/5ML oral liquid   No No   Sig: Take 10 mL (200 mg total) by mouth 3 (three) times a day as needed for cough   Patient not taking: Reported on 1/8/2025   hydrOXYzine HCL (ATARAX) 25 mg tablet   No No   Sig: Take 1 tablet (25 mg total) by mouth every 6 (six) hours as needed for anxiety   Patient not taking: Reported on 1/8/2025   ibuprofen (MOTRIN) 600 mg tablet   No No   Sig: Take 1 tablet (600 mg total) by mouth every 6 (six) hours as needed for mild pain   Patient not taking: Reported on 1/8/2025   lidocaine (Lidoderm) 5 %   No No   Sig: Apply 1 patch topically over 12 hours daily for 10 doses Remove & Discard patch within 12 hours or as directed by MD   Patient not taking: Reported on 1/8/2025   rifampin (RIFADIN) 300 mg capsule   No No   Sig: Take 2 capsules (600 mg total) by mouth daily      Facility-Administered Medications: None     Discharge Medication List as of 3/1/2025  5:05 PM        START taking these medications    Details   acetaminophen (TYLENOL) 500 mg tablet Take 1 tablet (500 mg total) by mouth every 6 (six) hours as needed for mild pain for up to 7 days, Starting Sat 3/1/2025, Until Sat 3/8/2025 at 2359, Normal      !! Diclofenac Sodium (VOLTAREN) 1 % Apply 2 g topically 4 (four) times a day, Starting Sat 3/1/2025, Normal      !! ibuprofen (MOTRIN) 200 mg tablet Take 2 tablets (400 mg total) by mouth every 6 (six) hours as needed for mild pain, Starting Sat 3/1/2025, Normal       !! - Potential duplicate medications found. Please discuss with provider.        CONTINUE these medications which have NOT CHANGED    Details   albuterol (Ventolin HFA) 90 mcg/act inhaler Inhale 2 puffs every 6 (six) hours as needed for wheezing, Starting Thu 11/14/2024, Normal      benzonatate (TESSALON) 200 MG capsule Take 1 capsule (200 mg total) by mouth 3 (three) times a day as needed for  cough, Starting Thu 11/14/2024, Normal      cyclobenzaprine (FLEXERIL) 10 mg tablet Take 1 tablet (10 mg total) by mouth 2 (two) times a day as needed for muscle spasms, Starting Tue 7/16/2024, Normal      !! Diclofenac Sodium (VOLTAREN) 1 % Apply 2 g topically 4 (four) times a day, Starting Tue 7/16/2024, Normal      !! Diclofenac Sodium (VOLTAREN) 1 % Apply 2 g topically 4 (four) times a day, Starting Thu 8/15/2024, Normal      Ferrous Gluconate (IRON 27 PO) Take by mouth, Historical Med      guaiFENesin (ROBITUSSIN) 100 MG/5ML oral liquid Take 10 mL (200 mg total) by mouth 3 (three) times a day as needed for cough, Starting u 11/14/2024, Normal      hydrOXYzine HCL (ATARAX) 25 mg tablet Take 1 tablet (25 mg total) by mouth every 6 (six) hours as needed for anxiety, Starting Sun 11/10/2024, Normal      !! ibuprofen (MOTRIN) 600 mg tablet Take 1 tablet (600 mg total) by mouth every 6 (six) hours as needed for mild pain, Starting u 8/15/2024, Normal      lidocaine (Lidoderm) 5 % Apply 1 patch topically over 12 hours daily for 10 doses Remove & Discard patch within 12 hours or as directed by MD, Starting Tue 7/16/2024, Until Fri 7/26/2024, Normal      rifampin (RIFADIN) 300 mg capsule Take 2 capsules (600 mg total) by mouth daily, Starting Wed 1/8/2025, Until Fri 2/7/2025, Normal       !! - Potential duplicate medications found. Please discuss with provider.        No discharge procedures on file.  ED SEPSIS DOCUMENTATION   Time reflects when diagnosis was documented in both MDM as applicable and the Disposition within this note       Time User Action Codes Description Comment    3/1/2025  4:46 PM Marielena Mendoza Add [M25.512,  G89.29] Chronic left shoulder pain                  Marielena Mendoza MD  03/01/25 1936

## (undated) DEVICE — 3M™ STERI-STRIP™ REINFORCED ADHESIVE SKIN CLOSURES, R1547, 1/2 IN X 4 IN (12 MM X 100 MM), 6 STRIPS/ENVELOPE: Brand: 3M™ STERI-STRIP™

## (undated) DEVICE — ADHESIVE SKIN HIGH VISCOSITY EXOFIN 1ML

## (undated) DEVICE — STERILE POLYISOPRENE POWDER-FREE SURGICAL GLOVES WITH EMOLLIENT COATING: Brand: PROTEXIS

## (undated) DEVICE — 3000CC GUARDIAN II: Brand: GUARDIAN

## (undated) DEVICE — PMI DISPOSABLE PUNCTURE CLOSURE DEVICE / SUTURE GRASPER: Brand: PMI

## (undated) DEVICE — SUT VICRYL 2-0 CT-1 27 IN J259H

## (undated) DEVICE — NEEDLE BLUNT 18 G X 1 1/2IN

## (undated) DEVICE — STANDARD SURGICAL GOWN, L: Brand: CONVERTORS

## (undated) DEVICE — SPONGE LAP 18 X 18 IN STRL RFD

## (undated) DEVICE — TROCAR: Brand: KII FIOS FIRST ENTRY

## (undated) DEVICE — TELFA NON-ADHERENT ABSORBENT DRESSING: Brand: TELFA

## (undated) DEVICE — GLOVE SRG BIOGEL ORTHOPEDIC 7.5

## (undated) DEVICE — DRAPE SHEET THREE QUARTER

## (undated) DEVICE — 4-PORT MANIFOLD: Brand: NEPTUNE 2

## (undated) DEVICE — STERILE POLYISOPRENE POWDER-FREE SURGICAL GLOVES: Brand: PROTEXIS

## (undated) DEVICE — U-DRAPE: Brand: CONVERTORS

## (undated) DEVICE — BANDAGE, ESMARK LF STR 6"X9' (20/CS): Brand: CYPRESS

## (undated) DEVICE — IRRIG ENDO FLO TUBING

## (undated) DEVICE — GLOVE INDICATOR PI UNDERGLOVE SZ 6.5 BLUE

## (undated) DEVICE — ALLENTOWN LAP CHOLE APP PACK: Brand: CARDINAL HEALTH

## (undated) DEVICE — SUT MONOCRYL 4-0 PS-2 27 IN Y426H

## (undated) DEVICE — STIRRUP STRAP ADULT DISP

## (undated) DEVICE — BLUE HEAT SCOPE WARMER

## (undated) DEVICE — GLOVE SRG BIOGEL 6.5

## (undated) DEVICE — NEEDLE 25G X 1 1/2

## (undated) DEVICE — BULB SYRINGE,IRRIGATION WITH PROTECTIVE CAP: Brand: DOVER

## (undated) DEVICE — SCD SEQUENTIAL COMPRESSION COMFORT SLEEVE MEDIUM KNEE LENGTH: Brand: KENDALL SCD

## (undated) DEVICE — [HIGH FLOW INSUFFLATOR,  DO NOT USE IF PACKAGE IS DAMAGED,  KEEP DRY,  KEEP AWAY FROM SUNLIGHT,  PROTECT FROM HEAT AND RADIOACTIVE SOURCES.]: Brand: PNEUMOSURE

## (undated) DEVICE — SUT VICRYL 0 UR-6 27 IN J603H

## (undated) DEVICE — TIBURON EXTREMITY DRAPE WITH ARMBOARD COVERS: Brand: CONVERTORS

## (undated) DEVICE — CHLORAPREP HI-LITE 26ML ORANGE

## (undated) DEVICE — ELECTRODE LAP J HOOK SPLIT STEM E-Z CLEAN 33CM -0021S

## (undated) DEVICE — TISSUE RETRIEVAL SYSTEM: Brand: INZII RETRIEVAL SYSTEM

## (undated) DEVICE — TUBING SMOKE EVAC W/FILTRATION DEVICE PLUMEPORT ACTIV

## (undated) DEVICE — COBAN 4 IN STERILE

## (undated) DEVICE — CUFF TOURNIQUET DISP SZ30

## (undated) DEVICE — BASIC PACK: Brand: CONVERTORS

## (undated) DEVICE — DRAPE EQUIPMENT RF WAND

## (undated) DEVICE — GLOVE INDICATOR PI UNDERGLOVE SZ 8 BLUE

## (undated) DEVICE — UNDERGLOVE PROTEXIS  BLUE SZ 7

## (undated) DEVICE — SUT MONOCRYL 3-0 PS-2 27 IN Y427H

## (undated) DEVICE — GLOVE SRG BIOGEL ECLIPSE 7.5

## (undated) DEVICE — INTENDED FOR TISSUE SEPARATION, AND OTHER PROCEDURES THAT REQUIRE A SHARP SURGICAL BLADE TO PUNCTURE OR CUT.: Brand: BARD-PARKER SAFETY BLADES SIZE 11, STERILE

## (undated) DEVICE — INTENDED FOR TISSUE SEPARATION, AND OTHER PROCEDURES THAT REQUIRE A SHARP SURGICAL BLADE TO PUNCTURE OR CUT.: Brand: BARD-PARKER SAFETY BLADES SIZE 15, STERILE

## (undated) DEVICE — LIGAMAX 5 MM ENDOSCOPIC MULTIPLE CLIP APPLIER: Brand: LIGAMAX

## (undated) DEVICE — PENCIL ELECTROSURG E-Z CLEAN -0035H

## (undated) DEVICE — TROCAR: Brand: KII SLEEVE

## (undated) DEVICE — SPECIMEN CONTAINER STERILE PEEL PACK

## (undated) DEVICE — STOCKINETTE,IMPERVIOUS,12X48,STERILE: Brand: MEDLINE

## (undated) DEVICE — 3M™ TEGADERM™ TRANSPARENT FILM DRESSING FRAME STYLE, 1626W, 4 IN X 4-3/4 IN (10 CM X 12 CM), 50/CT 4CT/CASE: Brand: 3M™ TEGADERM™

## (undated) DEVICE — SURGICAL GOWN, XL SMARTSLEEVE: Brand: CONVERTORS

## (undated) DEVICE — METZENBAUM ADTEC SINGLE USE DISSECTING SCISSORS, SHAFT ONLY, MONOPOLAR, CURVED TO LEFT, WORKING LENGTH: 12 1/4", (310 MM), DIAM. 5 MM, INSULATED, DOUBLE ACTION, STERILE, DISPOSABLE, PACKAGE OF 10 PIECES: Brand: AESCULAP

## (undated) DEVICE — 3M™ STERI-DRAPE™ U-DRAPE 1015: Brand: STERI-DRAPE™